# Patient Record
Sex: FEMALE | Race: WHITE | NOT HISPANIC OR LATINO | Employment: FULL TIME | URBAN - METROPOLITAN AREA
[De-identification: names, ages, dates, MRNs, and addresses within clinical notes are randomized per-mention and may not be internally consistent; named-entity substitution may affect disease eponyms.]

---

## 2018-12-04 ENCOUNTER — HOSPITAL ENCOUNTER (OUTPATIENT)
Dept: RADIOLOGY | Facility: CLINIC | Age: 58
Discharge: HOME | End: 2018-12-04
Attending: PODIATRIST
Payer: COMMERCIAL

## 2018-12-04 ENCOUNTER — OFFICE VISIT (OUTPATIENT)
Dept: PODIATRY | Facility: CLINIC | Age: 58
End: 2018-12-04
Payer: COMMERCIAL

## 2018-12-04 VITALS — WEIGHT: 240.75 LBS | BODY MASS INDEX: 44.3 KG/M2 | HEIGHT: 62 IN

## 2018-12-04 DIAGNOSIS — M79.671 FOOT PAIN, BILATERAL: ICD-10-CM

## 2018-12-04 DIAGNOSIS — M24.573 EQUINUS CONTRACTURE OF ANKLE: ICD-10-CM

## 2018-12-04 DIAGNOSIS — M79.672 FOOT PAIN, BILATERAL: ICD-10-CM

## 2018-12-04 DIAGNOSIS — M77.9 ENTHESOPATHY: Primary | ICD-10-CM

## 2018-12-04 DIAGNOSIS — M77.9 ENTHESOPATHY: ICD-10-CM

## 2018-12-04 PROCEDURE — 73650 X-RAY EXAM OF HEEL: CPT | Mod: 26,50,S$GLB, | Performed by: RADIOLOGY

## 2018-12-04 PROCEDURE — 73650 X-RAY EXAM OF HEEL: CPT | Mod: 50,TC,FY,PO

## 2018-12-04 PROCEDURE — 3008F BODY MASS INDEX DOCD: CPT | Mod: CPTII,S$GLB,, | Performed by: PODIATRIST

## 2018-12-04 PROCEDURE — 99213 OFFICE O/P EST LOW 20 MIN: CPT | Mod: S$GLB,,, | Performed by: PODIATRIST

## 2018-12-04 PROCEDURE — 99999 PR PBB SHADOW E&M-EST. PATIENT-LVL III: CPT | Mod: PBBFAC,,, | Performed by: PODIATRIST

## 2018-12-04 RX ORDER — DICLOFENAC SODIUM 10 MG/G
2 GEL TOPICAL 4 TIMES DAILY
Qty: 100 G | Refills: 2 | Status: SHIPPED | OUTPATIENT
Start: 2018-12-04 | End: 2023-02-06

## 2018-12-04 NOTE — PROGRESS NOTES
Subjective:      Patient ID: Angelina Jennings is a 58 y.o. female.    Chief Complaint: Foot Pain (bilateral ) and Heel Pain (bilateral)    Sharp deep pain back of left and right heels.  Gradual onset, worsening over past several weeks, aggravated by increased weight bearing, shoe gear, pressure.  No previous medical treatment.  OTC pain med not helping. Denies trauma, surgery.    Review of Systems   Constitution: Negative for chills, diaphoresis, fever, malaise/fatigue and night sweats.   Cardiovascular: Negative for claudication, cyanosis, leg swelling and syncope.   Skin: Negative for color change, dry skin, nail changes, rash, suspicious lesions and unusual hair distribution.   Musculoskeletal: Negative for falls, joint pain, joint swelling, muscle cramps, muscle weakness and stiffness.   Gastrointestinal: Negative for constipation, diarrhea, nausea and vomiting.   Neurological: Negative for brief paralysis, disturbances in coordination, focal weakness, numbness, paresthesias, sensory change and tremors.           Objective:      Physical Exam   Constitutional: She is oriented to person, place, and time. She appears well-developed and well-nourished. She is cooperative. No distress.   Cardiovascular:   Pulses:       Popliteal pulses are 2+ on the right side, and 2+ on the left side.        Dorsalis pedis pulses are 2+ on the right side, and 2+ on the left side.        Posterior tibial pulses are 2+ on the right side, and 2+ on the left side.   Capillary refill 3 seconds all toes/distal feet, all toes/both feet warm to touch.      Negative lymphadenopathy bilateral popliteal fossa and tarsal tunnel.      Negavie lower extremity edema bilateral.     Musculoskeletal:        Right ankle: She exhibits normal range of motion, no swelling, no ecchymosis, no deformity, no laceration and normal pulse. Achilles tendon normal. Achilles tendon exhibits no pain, no defect and normal Castillo's test results.   Pain to palpation  posterior heel left more than right with visible and palpable hard prominence of bone.  No evidence of trauma or infection present today.    Ankle dorsiflexion decreased at <10 degrees bilateral with moderate increase with knee flexion bilateral.    Otherwise, Normal angle, base, station of gait. All ten toes without clubbing, cyanosis, or signs of ischemia.  No pain to palpation bilateral lower extremities.  Range of motion, stability, muscle strength, and muscle tone normal bilateral feet and legs.    Lymphadenopathy: No inguinal adenopathy noted on the right or left side.   Negative lymphadenopathy bilateral popliteal fossa and tarsal tunnel.    Negative lymphangitic streaking bilateral feet/ankles/legs.   Neurological: She is alert and oriented to person, place, and time. She has normal strength. She displays no atrophy and no tremor. No sensory deficit. She exhibits normal muscle tone. Gait normal.   Reflex Scores:       Patellar reflexes are 2+ on the right side and 2+ on the left side.       Achilles reflexes are 2+ on the right side and 2+ on the left side.  Negative tinel sign to percussion sural, superficial peroneal, deep peroneal, saphenous, and posterior tibial nerves right and left ankles and feet.     Skin: Skin is warm, dry and intact. Capillary refill takes 2 to 3 seconds. No abrasion, no bruising, no burn, no ecchymosis, no laceration, no lesion and no rash noted. She is not diaphoretic. No cyanosis or erythema. No pallor. Nails show no clubbing.     Skin is normal age and health appropriate color, turgor, texture, and temperature bilateral lower extremities without ulceration, hyperpigmentation, discoloration, masses nodules or cords palpated.  No ecchymosis, erythema, edema, or cardinal signs of infection bilateral lower extremities.     Psychiatric: She has a normal mood and affect.             Assessment:       Encounter Diagnoses   Name Primary?    Enthesopathy Yes    Equinus contracture of  ankle     Foot pain, bilateral          Plan:       Angelina was seen today for foot pain and heel pain.    Diagnoses and all orders for this visit:    Enthesopathy  -     X-Ray Calcaneus Bilateral; Future  -     ORTHOTIC DEVICE (DME)    Equinus contracture of ankle  -     X-Ray Calcaneus Bilateral; Future  -     ORTHOTIC DEVICE (DME)    Foot pain, bilateral  -     X-Ray Calcaneus Bilateral; Future  -     ORTHOTIC DEVICE (DME)    Other orders  -     diclofenac sodium (VOLTAREN) 1 % Gel; Apply 2 g topically 4 (four) times daily.      I counseled the patient on her conditions, their implications and medical management.    Patient will stretch the tendo achilles complex three times daily as demonstrated in the office.  Literature was dispensed illustrating proper stretching technique.    Patient will obtain over the counter arch supports and wear them in shoes whenever possible.  Athletic shoes intended for walking or running are usually best.    The patient was advised that NSAID-type medications have two very important potential side effects: gastrointestinal irritation including hemorrhage and renal injuries. She was asked to take the medication with food and to stop if she experiences any GI upset. I asked her to call for vomiting, abdominal pain or black/bloody stools. The patient expresses understanding of these issues and questions were answered.    Discussed conservative treatment with shoes of adequate dimensions, material, and style to alleviate symptoms and delay or prevent surgical intervention.    Rx xrays, voltaren gel, heel lift, quad cane          Follow-up in about 1 month (around 1/4/2019).

## 2019-01-08 ENCOUNTER — OFFICE VISIT (OUTPATIENT)
Dept: PODIATRY | Facility: CLINIC | Age: 59
End: 2019-01-08
Payer: COMMERCIAL

## 2019-01-08 VITALS
BODY MASS INDEX: 44.16 KG/M2 | HEIGHT: 62 IN | SYSTOLIC BLOOD PRESSURE: 136 MMHG | DIASTOLIC BLOOD PRESSURE: 75 MMHG | HEART RATE: 73 BPM | WEIGHT: 240 LBS

## 2019-01-08 DIAGNOSIS — M79.672 FOOT PAIN, BILATERAL: ICD-10-CM

## 2019-01-08 DIAGNOSIS — M24.573 EQUINUS CONTRACTURE OF ANKLE: ICD-10-CM

## 2019-01-08 DIAGNOSIS — M77.9 ENTHESOPATHY: Primary | ICD-10-CM

## 2019-01-08 DIAGNOSIS — M79.671 FOOT PAIN, BILATERAL: ICD-10-CM

## 2019-01-08 PROCEDURE — 99999 PR PBB SHADOW E&M-EST. PATIENT-LVL III: CPT | Mod: PBBFAC,,, | Performed by: PODIATRIST

## 2019-01-08 PROCEDURE — 99999 PR PBB SHADOW E&M-EST. PATIENT-LVL III: ICD-10-PCS | Mod: PBBFAC,,, | Performed by: PODIATRIST

## 2019-01-08 PROCEDURE — 99212 OFFICE O/P EST SF 10 MIN: CPT | Mod: S$GLB,,, | Performed by: PODIATRIST

## 2019-01-08 PROCEDURE — 99212 PR OFFICE/OUTPT VISIT, EST, LEVL II, 10-19 MIN: ICD-10-PCS | Mod: S$GLB,,, | Performed by: PODIATRIST

## 2019-01-08 PROCEDURE — 3008F BODY MASS INDEX DOCD: CPT | Mod: CPTII,S$GLB,, | Performed by: PODIATRIST

## 2019-01-08 PROCEDURE — 3008F PR BODY MASS INDEX (BMI) DOCUMENTED: ICD-10-PCS | Mod: CPTII,S$GLB,, | Performed by: PODIATRIST

## 2019-01-08 NOTE — PROGRESS NOTES
Subjective:      Patient ID: Angelina Jennings is a 58 y.o. female.    Chief Complaint: Foot Pain (left)    Sharp deep pain back of left and right heels.  Gradual onset, worsening over past several weeks, aggravated by increased weight bearing, shoe gear, pressure.  Improved well with nsaid gel, stretches, activity change. Denies trauma, surgery.    Review of Systems   Constitution: Negative for chills, diaphoresis, fever, malaise/fatigue and night sweats.   Cardiovascular: Negative for claudication, cyanosis, leg swelling and syncope.   Skin: Negative for color change, dry skin, nail changes, rash, suspicious lesions and unusual hair distribution.   Musculoskeletal: Negative for falls, joint pain, joint swelling, muscle cramps, muscle weakness and stiffness.   Gastrointestinal: Negative for constipation, diarrhea, nausea and vomiting.   Neurological: Negative for brief paralysis, disturbances in coordination, focal weakness, numbness, paresthesias, sensory change and tremors.           Objective:      Physical Exam   Constitutional: She is oriented to person, place, and time. She appears well-developed and well-nourished. She is cooperative. No distress.   Cardiovascular:   Pulses:       Popliteal pulses are 2+ on the right side, and 2+ on the left side.        Dorsalis pedis pulses are 2+ on the right side, and 2+ on the left side.        Posterior tibial pulses are 2+ on the right side, and 2+ on the left side.   Capillary refill 3 seconds all toes/distal feet, all toes/both feet warm to touch.      Negative lymphadenopathy bilateral popliteal fossa and tarsal tunnel.      Negavie lower extremity edema bilateral.     Musculoskeletal:        Right ankle: She exhibits normal range of motion, no swelling, no ecchymosis, no deformity, no laceration and normal pulse. Achilles tendon normal. Achilles tendon exhibits no pain, no defect and normal Castillo's test results.   Pain to palpation posterior heel left more than right  with visible and palpable hard prominence of bone.  No evidence of trauma or infection present today.    Ankle dorsiflexion decreased at <10 degrees bilateral with moderate increase with knee flexion bilateral.    Otherwise, Normal angle, base, station of gait. All ten toes without clubbing, cyanosis, or signs of ischemia.  No pain to palpation bilateral lower extremities.  Range of motion, stability, muscle strength, and muscle tone normal bilateral feet and legs.    Lymphadenopathy: No inguinal adenopathy noted on the right or left side.   Negative lymphadenopathy bilateral popliteal fossa and tarsal tunnel.    Negative lymphangitic streaking bilateral feet/ankles/legs.   Neurological: She is alert and oriented to person, place, and time. She has normal strength. She displays no atrophy and no tremor. No sensory deficit. She exhibits normal muscle tone. Gait normal.   Reflex Scores:       Patellar reflexes are 2+ on the right side and 2+ on the left side.       Achilles reflexes are 2+ on the right side and 2+ on the left side.  Negative tinel sign to percussion sural, superficial peroneal, deep peroneal, saphenous, and posterior tibial nerves right and left ankles and feet.     Skin: Skin is warm, dry and intact. Capillary refill takes 2 to 3 seconds. No abrasion, no bruising, no burn, no ecchymosis, no laceration, no lesion and no rash noted. She is not diaphoretic. No cyanosis or erythema. No pallor. Nails show no clubbing.     Skin is normal age and health appropriate color, turgor, texture, and temperature bilateral lower extremities without ulceration, hyperpigmentation, discoloration, masses nodules or cords palpated.  No ecchymosis, erythema, edema, or cardinal signs of infection bilateral lower extremities.     Psychiatric: She has a normal mood and affect.             Assessment:       Encounter Diagnoses   Name Primary?    Enthesopathy Yes    Equinus contracture of ankle     Foot pain, bilateral           Plan:       Angelina was seen today for foot pain.    Diagnoses and all orders for this visit:    Enthesopathy    Equinus contracture of ankle    Foot pain, bilateral      I counseled the patient on her conditions, their implications and medical management.    Continue stretches, nsaid gel prn, careful shoe/activity selection.          No Follow-up on file.

## 2019-10-14 RX ORDER — POTASSIUM CHLORIDE 750 MG/1
30 CAPSULE, EXTENDED RELEASE ORAL DAILY
Qty: 270 CAPSULE | Refills: 1 | Status: SHIPPED | OUTPATIENT
Start: 2019-10-14 | End: 2020-01-30 | Stop reason: SDUPTHER

## 2019-10-14 NOTE — TELEPHONE ENCOUNTER
Pt left a VM regarding her Potassium. States it was written wrong 3 capsules daily #30. Set up with correct quantity.

## 2019-12-04 ENCOUNTER — TELEPHONE (OUTPATIENT)
Dept: FAMILY MEDICINE | Facility: CLINIC | Age: 59
End: 2019-12-04

## 2019-12-04 ENCOUNTER — OFFICE VISIT (OUTPATIENT)
Dept: FAMILY MEDICINE | Facility: CLINIC | Age: 59
End: 2019-12-04
Payer: COMMERCIAL

## 2019-12-04 VITALS
DIASTOLIC BLOOD PRESSURE: 78 MMHG | WEIGHT: 248 LBS | SYSTOLIC BLOOD PRESSURE: 128 MMHG | BODY MASS INDEX: 45.64 KG/M2 | HEART RATE: 68 BPM | HEIGHT: 62 IN

## 2019-12-04 DIAGNOSIS — E11.9 TYPE 2 DIABETES MELLITUS WITHOUT COMPLICATION, WITHOUT LONG-TERM CURRENT USE OF INSULIN: ICD-10-CM

## 2019-12-04 DIAGNOSIS — L03.211 CELLULITIS OF FACE: Primary | ICD-10-CM

## 2019-12-04 PROCEDURE — 99213 PR OFFICE/OUTPT VISIT, EST, LEVL III, 20-29 MIN: ICD-10-PCS | Mod: S$GLB,,, | Performed by: NURSE PRACTITIONER

## 2019-12-04 PROCEDURE — 3008F PR BODY MASS INDEX (BMI) DOCUMENTED: ICD-10-PCS | Mod: S$GLB,,, | Performed by: NURSE PRACTITIONER

## 2019-12-04 PROCEDURE — 3008F BODY MASS INDEX DOCD: CPT | Mod: S$GLB,,, | Performed by: NURSE PRACTITIONER

## 2019-12-04 PROCEDURE — 99213 OFFICE O/P EST LOW 20 MIN: CPT | Mod: S$GLB,,, | Performed by: NURSE PRACTITIONER

## 2019-12-04 RX ORDER — CHOLECALCIFEROL (VITAMIN D3) 25 MCG
2500 TABLET,CHEWABLE ORAL DAILY
COMMUNITY
End: 2023-12-18

## 2019-12-04 RX ORDER — METFORMIN HYDROCHLORIDE 750 MG/1
1 TABLET, EXTENDED RELEASE ORAL 2 TIMES DAILY
Refills: 5 | COMMUNITY
Start: 2019-10-26 | End: 2020-01-30 | Stop reason: SDUPTHER

## 2019-12-04 RX ORDER — AMOXICILLIN AND CLAVULANATE POTASSIUM 875; 125 MG/1; MG/1
1 TABLET, FILM COATED ORAL EVERY 12 HOURS
Qty: 10 TABLET | Refills: 0 | Status: SHIPPED | OUTPATIENT
Start: 2019-12-04 | End: 2019-12-09

## 2019-12-04 RX ORDER — NAPROXEN SODIUM 220 MG/1
81 TABLET, FILM COATED ORAL DAILY
COMMUNITY

## 2019-12-04 RX ORDER — MUPIROCIN 20 MG/G
OINTMENT TOPICAL 3 TIMES DAILY
Qty: 1 TUBE | Refills: 1 | Status: SHIPPED | OUTPATIENT
Start: 2019-12-04 | End: 2020-01-30

## 2019-12-04 NOTE — TELEPHONE ENCOUNTER
----- Message from Jane Arredondo sent at 12/4/2019 10:20 AM CST -----  Pt got bite by a spider or bug, she got bite on Saturday, her left eye is swollen, lump on the left side of there jaw a little warm to the touch .doesnt know if she should be seen or just take meds from [facility]   Pt [phone]

## 2019-12-04 NOTE — PROGRESS NOTES
SUBJECTIVE:    Patient ID: Angelina Jennings is a 59 y.o. female.    Chief Complaint: Insect Bite (forehead, nose, left eye, left side of face since Saturday, no bottles, used pharmacy [facility]// [facility])    59 year old female presents for facial redness and swelling after bug bite. Was bit by unknown bug over weekend in center of forehead. Progressed to more swelling and redness. Two more spots appeared on either side of nasal bridge on Monday. Today L eyelid is swollen and a swollen mass on L jawline. Denies pain or fever. Also requesting refill on her glucometer test strips.      No visits with results within 6 Month(s) from this visit.   Latest known visit with results is:   No results found for any previous visit.       Past Medical History:   Diagnosis Date    Diabetes mellitus, type 2     Hypertension      Past Surgical History:   Procedure Laterality Date    TONSILLECTOMY       History reviewed. No pertinent family history.    Marital Status:   Alcohol History:  reports that she does not drink alcohol.  Tobacco History:  reports that she quit smoking about 6 years ago. She has never used smokeless tobacco.  Drug History:  reports that she does not use drugs.    Review of patient's allergies indicates:  No Known Allergies    Current Outpatient Medications:     aspirin 81 MG Chew, 1 tablet, Disp: , Rfl:     atenolol-chlorthalidone (TENORETIC) 50-25 mg Tab, Take 1 tablet by mouth once daily. , Disp: , Rfl:     blood sugar diagnostic (ONETOUCH ULTRA BLUE TEST STRIP) Strp, as directed, Disp: 30 strip, Rfl: 2    cyanocobalamin (VITAMIN B-12) 1000 MCG tablet, 1 tablet, Disp: , Rfl:     FLUBLOK QUAD 5772-3560, PF, 180 mcg (45 mcg x 4)/0.5 mL Syrg, PHARMACIST ADMINISTERED IMMUNIZATION ADMINISTERED AT TIME OF DISPENSING, Disp: , Rfl: 0    INVOKANA 100 mg Tab, Take 100 mg by mouth once daily. , Disp: , Rfl:     losartan (COZAAR) 25 MG tablet, Take 25 mg by mouth 2 (two) times daily. , Disp: , Rfl:     metFORMIN  "(GLUCOPHAGE-XR) 750 MG 24 hr tablet, Take 1 tablet by mouth 2 (two) times daily., Disp: , Rfl: 5    potassium chloride (MICRO-K) 10 MEQ CpSR, Take 3 capsules (30 mEq total) by mouth once daily., Disp: 270 capsule, Rfl: 1    amoxicillin-clavulanate 875-125mg (AUGMENTIN) 875-125 mg per tablet, Take 1 tablet by mouth every 12 (twelve) hours. for 5 days, Disp: 10 tablet, Rfl: 0    diclofenac sodium (VOLTAREN) 1 % Gel, Apply 2 g topically 4 (four) times daily., Disp: 100 g, Rfl: 2    mupirocin (BACTROBAN) 2 % ointment, Apply topically 3 (three) times daily., Disp: 1 Tube, Rfl: 1    Review of Systems   HENT: Positive for facial swelling. Negative for congestion, ear pain, sinus pressure and sinus pain.    Respiratory: Negative for cough and shortness of breath.    Cardiovascular: Negative for chest pain.   Skin: Positive for rash and wound.          Objective:      Vitals:    12/04/19 1114   BP: 128/78   Pulse: 68   Weight: 112.5 kg (248 lb)   Height: 5' 2.25" (1.581 m)     Body mass index is 45 kg/m².  Physical Exam   Constitutional: She appears well-developed.   HENT:   Right Ear: Tympanic membrane and ear canal normal.   Left Ear: Tympanic membrane and ear canal normal.   Nose: Nose normal.   Mouth/Throat: Oropharynx is clear and moist.   Eyes: No foreign body present in the left eye. Left conjunctiva is not injected. Left eye exhibits normal extraocular motion.   L lid swelling   Cardiovascular: Normal rate and regular rhythm.   Lymphadenopathy:        Head (right side): No preauricular adenopathy present.        Head (left side): Preauricular adenopathy present.     She has no cervical adenopathy.   Skin: Rash noted. There is erythema.   Red, raised, circular redness about 1cm circumference with small scabbing in center. Similar spots about 0.5cm on either side of nasal bridge.         Assessment:       1. Cellulitis of face    2. Type 2 diabetes mellitus without complication, without long-term current use of " insulin         Plan:       Cellulitis of face  -     amoxicillin-clavulanate 875-125mg (AUGMENTIN) 875-125 mg per tablet; Take 1 tablet by mouth every 12 (twelve) hours. for 5 days  Dispense: 10 tablet; Refill: 0  -     mupirocin (BACTROBAN) 2 % ointment; Apply topically 3 (three) times daily.  Dispense: 1 Tube; Refill: 1  - OTC benadryl prn and cold compresses.    Type 2 diabetes mellitus without complication, without long-term current use of insulin  -     blood sugar diagnostic (ONETOUCH ULTRA BLUE TEST STRIP) Strp; as directed  Dispense: 30 strip; Refill:       Follow up if symptoms worsen or fail to improve. For fever or worsening symptoms.   Follow up with [facility] for routine wellness.

## 2019-12-04 NOTE — TELEPHONE ENCOUNTER
----- Message from Jane Arredondo sent at 12/4/2019 12:13 PM CST -----  [facility] is needing clarification on a medication   [place] [phone]

## 2019-12-06 ENCOUNTER — TELEPHONE (OUTPATIENT)
Dept: FAMILY MEDICINE | Facility: CLINIC | Age: 59
End: 2019-12-06

## 2019-12-06 DIAGNOSIS — H10.9 CONJUNCTIVITIS OF BOTH EYES, UNSPECIFIED CONJUNCTIVITIS TYPE: Primary | ICD-10-CM

## 2019-12-06 RX ORDER — POLYMYXIN B SULFATE AND TRIMETHOPRIM 1; 10000 MG/ML; [USP'U]/ML
1 SOLUTION OPHTHALMIC EVERY 6 HOURS
Qty: 2.6667 ML | Refills: 0 | Status: SHIPPED | OUTPATIENT
Start: 2019-12-06 | End: 2019-12-16

## 2019-12-06 NOTE — TELEPHONE ENCOUNTER
----- Message from Farideh Klein sent at 12/6/2019  9:50 AM CST -----  Pt has been taking Amoxacillin 875-125 mg tabs and now has symptoms of pink eye in both eyes.  Pt. Would like something called in for pink eye please. She would rather not come in if possible.[facility] [address]. Pt #[phone]

## 2019-12-06 NOTE — TELEPHONE ENCOUNTER
I reviewed the OV note from [facility]. Looks like facial cellulitis diagnosed. I called patient for clarification on new sxs and she reports some improvement to the facial swelling and infection but reports new conjunctivitis sxs. I am going to send in polytrim now and she has been instructed if sxs worsen will need further evaluation

## 2020-01-16 ENCOUNTER — TELEPHONE (OUTPATIENT)
Dept: FAMILY MEDICINE | Facility: CLINIC | Age: 60
End: 2020-01-16

## 2020-01-16 DIAGNOSIS — Z79.899 ENCOUNTER FOR LONG-TERM (CURRENT) USE OF OTHER MEDICATIONS: ICD-10-CM

## 2020-01-16 DIAGNOSIS — E11.9 TYPE 2 DIABETES MELLITUS WITHOUT COMPLICATION, WITHOUT LONG-TERM CURRENT USE OF INSULIN: ICD-10-CM

## 2020-01-16 DIAGNOSIS — Z00.00 ROUTINE GENERAL MEDICAL EXAMINATION AT A HEALTH CARE FACILITY: Primary | ICD-10-CM

## 2020-01-16 NOTE — TELEPHONE ENCOUNTER
----- Message from Geneva Scott sent at 1/16/2020  3:04 PM CST -----  Contact: Angelina Jennings  Pt would like to cancel the request for refills that she put in for earlier today . She says that she has enough til her appt.   Pt# [phone]

## 2020-01-16 NOTE — TELEPHONE ENCOUNTER
----- Message from Jane Arredondo sent at 1/16/2020 10:34 AM CST -----  Pt is needing refils until her appt  On the 30th   Metformin, potassium and invokana, losartan, and atenolol   Pharm [facility] [address]   Pt [phone]

## 2020-01-30 ENCOUNTER — OFFICE VISIT (OUTPATIENT)
Dept: FAMILY MEDICINE | Facility: CLINIC | Age: 60
End: 2020-01-30
Payer: COMMERCIAL

## 2020-01-30 VITALS — HEIGHT: 62 IN | BODY MASS INDEX: 44.97 KG/M2 | WEIGHT: 244.38 LBS

## 2020-01-30 DIAGNOSIS — I10 ESSENTIAL HYPERTENSION: ICD-10-CM

## 2020-01-30 DIAGNOSIS — Z00.00 ROUTINE PHYSICAL EXAMINATION: ICD-10-CM

## 2020-01-30 DIAGNOSIS — E11.9 TYPE 2 DIABETES MELLITUS WITHOUT COMPLICATION, WITHOUT LONG-TERM CURRENT USE OF INSULIN: Primary | ICD-10-CM

## 2020-01-30 DIAGNOSIS — M18.12 ARTHRITIS OF CARPOMETACARPAL (CMC) JOINT OF LEFT THUMB: ICD-10-CM

## 2020-01-30 LAB — HBA1C MFR BLD: 7.2 %

## 2020-01-30 PROCEDURE — 3078F PR MOST RECENT DIASTOLIC BLOOD PRESSURE < 80 MM HG: ICD-10-PCS | Mod: S$GLB,,, | Performed by: PHYSICIAN ASSISTANT

## 2020-01-30 PROCEDURE — 99396 PR PREVENTIVE VISIT,EST,40-64: ICD-10-PCS | Mod: S$GLB,,, | Performed by: PHYSICIAN ASSISTANT

## 2020-01-30 PROCEDURE — 3074F SYST BP LT 130 MM HG: CPT | Mod: S$GLB,,, | Performed by: PHYSICIAN ASSISTANT

## 2020-01-30 PROCEDURE — 83036 POCT HEMOGLOBIN A1C: ICD-10-PCS | Mod: QW,,, | Performed by: PHYSICIAN ASSISTANT

## 2020-01-30 PROCEDURE — 3074F PR MOST RECENT SYSTOLIC BLOOD PRESSURE < 130 MM HG: ICD-10-PCS | Mod: S$GLB,,, | Performed by: PHYSICIAN ASSISTANT

## 2020-01-30 PROCEDURE — 83036 HEMOGLOBIN GLYCOSYLATED A1C: CPT | Mod: QW,,, | Performed by: PHYSICIAN ASSISTANT

## 2020-01-30 PROCEDURE — 3078F DIAST BP <80 MM HG: CPT | Mod: S$GLB,,, | Performed by: PHYSICIAN ASSISTANT

## 2020-01-30 PROCEDURE — 99396 PREV VISIT EST AGE 40-64: CPT | Mod: S$GLB,,, | Performed by: PHYSICIAN ASSISTANT

## 2020-01-30 RX ORDER — CANAGLIFLOZIN 300 MG/1
300 TABLET, FILM COATED ORAL DAILY
Qty: 90 TABLET | Refills: 1 | Status: SHIPPED | OUTPATIENT
Start: 2020-01-30 | End: 2020-07-28

## 2020-01-30 RX ORDER — LOSARTAN POTASSIUM 25 MG/1
50 TABLET ORAL DAILY
Qty: 180 TABLET | Refills: 1 | Status: SHIPPED | OUTPATIENT
Start: 2020-01-30 | End: 2020-07-30 | Stop reason: SDUPTHER

## 2020-01-30 RX ORDER — METFORMIN HYDROCHLORIDE 750 MG/1
750 TABLET, EXTENDED RELEASE ORAL 2 TIMES DAILY
Qty: 180 TABLET | Refills: 1 | Status: SHIPPED | OUTPATIENT
Start: 2020-01-30 | End: 2020-06-25 | Stop reason: SDUPTHER

## 2020-01-30 RX ORDER — ATENOLOL AND CHLORTHALIDONE TABLET 50; 25 MG/1; MG/1
1 TABLET ORAL DAILY
Qty: 90 TABLET | Refills: 1 | Status: SHIPPED | OUTPATIENT
Start: 2020-01-30 | End: 2020-07-30 | Stop reason: SDUPTHER

## 2020-01-30 RX ORDER — CANAGLIFLOZIN 300 MG/1
300 TABLET, FILM COATED ORAL DAILY
COMMUNITY
Start: 2020-01-16 | End: 2020-01-30 | Stop reason: SDUPTHER

## 2020-01-30 RX ORDER — POTASSIUM CHLORIDE 750 MG/1
30 CAPSULE, EXTENDED RELEASE ORAL DAILY
Qty: 270 CAPSULE | Refills: 1 | Status: SHIPPED | OUTPATIENT
Start: 2020-01-30 | End: 2020-07-28

## 2020-01-30 RX ORDER — ASCORBIC ACID 500 MG
500 TABLET ORAL DAILY
COMMUNITY

## 2020-01-30 NOTE — PATIENT INSTRUCTIONS
What is Arthritis?  Arthritis is a disease that affects the joints (the parts where bones meet and move). It can affect any joint in your body. There are many types of arthritis, including osteoarthritis and rheumatoid arthrtitis. If your symptoms are mild, medications may be enough to reduce pain and swelling. For more severe arthritis, surgery may be needed to improve the condition of the joint or replace the joint entirely.                  What causes arthritis?  Cartilage is a smooth substance that protects the ends of your bones and provides cushioning. When you have arthritis, this cartilage breaks down and can no longer protect your bones. The bones rub against each other, causing pain and swelling. Over time, bone spurs (small pieces of rough or splintered bone) may develop, and the joint's range of motion can become limited.  Symptoms  Some of the more common symptoms of arthritis include:  · Joint pain and stiffness. Pain and stiffness get worse with long periods of rest or using a joint too long or too hard.  · Joints that have lost normal shape and motion.  · Tender, inflamed joints. They may look red and feel warm.  · Grinding or popping noise with joint movement.   · Feeling tired all the time.  Reducing symptoms  Following a healthy lifestyle by losing weight and exercising can help reduce symptoms of osteoarthritis. Medicines can be very helpful for arthritis.     Date Last Reviewed: 9/10/2015  © 7236-4287 [employer]. [address] [place] [address], [place] [address]. All rights reserved. This information is not intended as a substitute for professional medical care. Always follow your healthcare professional's instructions.

## 2020-01-30 NOTE — PROGRESS NOTES
"  SUBJECTIVE:    Patient ID: Angelina Jennings is a 59 y.o. female.    Chief Complaint: Diabetes (6 month follow up.....mlr); Medication Refill (Med bottles present and reconciled....pharmacy verified.....mlr); and Hand Pain (Pt reports she "felt a pop" in her left hand, near thumb, and now having pain in hand.....mlr)    This is a 59-year-old white female who presents today for regular 3 month diabetic check up.  She reports that she has been doing well with no major concerns.  She does bring up some left thumb pain of late.  She reports feeling a pop and then now some persistent pain since.  Her A1c comes back at 7.2% and right close to goal.  Her blood pressure also remains well controlled today.  She is due for annual labs and her diabetic foot exam. She had a bad bout with facial cellulitis back in the fall. Finally cleared out nicely.      No visits with results within 6 Month(s) from this visit.   Latest known visit with results is:   No results found for any previous visit.       Past Medical History:   Diagnosis Date    Diabetes mellitus, type 2     Hypertension      Past Surgical History:   Procedure Laterality Date    TONSILLECTOMY       History reviewed. No pertinent family history.    Marital Status:   Alcohol History:  reports that she drinks alcohol.  Tobacco History:  reports that she quit smoking about 6 years ago. She has never used smokeless tobacco.  Drug History:  reports that she does not use drugs.    Review of patient's allergies indicates:  No Known Allergies    Current Outpatient Medications:     ascorbic acid, vitamin C, (VITAMIN C) 500 MG tablet, Take 500 mg by mouth once daily., Disp: , Rfl:     aspirin 81 MG Chew, Take 81 mg by mouth once daily. , Disp: , Rfl:     atenolol-chlorthalidone (TENORETIC) 50-25 mg Tab, Take 1 tablet by mouth once daily., Disp: 90 tablet, Rfl: 1    blood sugar diagnostic (ONETOUCH ULTRA BLUE TEST STRIP) Strp, 30 strips by In Vitro route once daily. as " "directed, Disp: 30 strip, Rfl: 2    cyanocobalamin, vitamin B-12, 2,500 mcg Tab, 2,500 mcg once daily. , Disp: , Rfl:     diclofenac sodium (VOLTAREN) 1 % Gel, Apply 2 g topically 4 (four) times daily., Disp: 100 g, Rfl: 2    INVOKANA 300 mg Tab tablet, Take 1 tablet (300 mg total) by mouth once daily., Disp: 90 tablet, Rfl: 1    losartan (COZAAR) 25 MG tablet, Take 2 tablets (50 mg total) by mouth once daily., Disp: 180 tablet, Rfl: 1    metFORMIN (GLUCOPHAGE-XR) 750 MG 24 hr tablet, Take 1 tablet (750 mg total) by mouth 2 (two) times daily., Disp: 180 tablet, Rfl: 1    potassium chloride (MICRO-K) 10 MEQ CpSR, Take 3 capsules (30 mEq total) by mouth once daily., Disp: 270 capsule, Rfl: 1    Review of Systems   Constitutional: Negative for appetite change, chills, fatigue, fever and unexpected weight change.   HENT: Negative for congestion.    Respiratory: Negative for cough, chest tightness and shortness of breath.    Cardiovascular: Negative for chest pain and palpitations.   Gastrointestinal: Negative for abdominal distention and abdominal pain.   Endocrine: Negative for cold intolerance and heat intolerance.   Genitourinary: Negative for difficulty urinating and dysuria.   Musculoskeletal: Positive for arthralgias and joint swelling. Negative for back pain.   Neurological: Negative for dizziness, weakness and headaches.          Objective:      Vitals:    01/30/20 0757   BP: (P) 122/78   Pulse: (P) 60   Weight: 110.9 kg (244 lb 6.4 oz)   Height: 5' 2.25" (1.581 m)     Physical Exam   Constitutional: She is oriented to person, place, and time. She appears well-developed and well-nourished. No distress.   HENT:   Head: Normocephalic and atraumatic.   Eyes: Pupils are equal, round, and reactive to light. Conjunctivae and EOM are normal.   Neck: Normal range of motion. Neck supple. No thyromegaly present.   Cardiovascular: Normal rate, regular rhythm, normal heart sounds and intact distal pulses.   Pulses:    "    Dorsalis pedis pulses are 2+ on the right side, and 2+ on the left side.        Posterior tibial pulses are 2+ on the right side, and 2+ on the left side.   Pulmonary/Chest: Effort normal and breath sounds normal.   Abdominal: Soft. Bowel sounds are normal. She exhibits no distension. There is no tenderness.   Musculoskeletal: Normal range of motion.        Right foot: There is normal range of motion and no deformity.        Left foot: There is normal range of motion and no deformity.   Feet:   Right Foot:   Protective Sensation: 2 sites tested. 2 sites sensed.   Skin Integrity: Positive for callus and dry skin. Negative for ulcer or blister.   Left Foot:   Protective Sensation: 2 sites tested. 2 sites sensed.   Skin Integrity: Positive for callus and dry skin.   Neurological: She is alert and oriented to person, place, and time. No cranial nerve deficit.   Skin: Skin is warm and dry. No erythema.   Psychiatric: She has a normal mood and affect.         Assessment:       1. Type 2 diabetes mellitus without complication, without long-term current use of insulin    2. Essential hypertension    3. Routine physical examination    4. Arthritis of carpometacarpal (CMC) joint of left thumb         Plan:       Type 2 diabetes mellitus without complication, without long-term current use of insulin  Comments:  A1c is at 7.2% and she is right close to goal. we discussed altering diet a bit. Not going to change meds at this time. DFE completed.  Orders:  -     Hemoglobin A1C, POCT  -     metFORMIN (GLUCOPHAGE-XR) 750 MG 24 hr tablet; Take 1 tablet (750 mg total) by mouth 2 (two) times daily.  Dispense: 180 tablet; Refill: 1  -     INVOKANA 300 mg Tab tablet; Take 1 tablet (300 mg total) by mouth once daily.  Dispense: 90 tablet; Refill: 1    Essential hypertension  Comments:  BP looks stable, continue as is.  Orders:  -     atenolol-chlorthalidone (TENORETIC) 50-25 mg Tab; Take 1 tablet by mouth once daily.  Dispense: 90  tablet; Refill: 1  -     potassium chloride (MICRO-K) 10 MEQ CpSR; Take 3 capsules (30 mEq total) by mouth once daily.  Dispense: 270 capsule; Refill: 1  -     losartan (COZAAR) 25 MG tablet; Take 2 tablets (50 mg total) by mouth once daily.  Dispense: 180 tablet; Refill: 1    Routine physical examination  Comments:  She has orderrs to get her annual labs done for me today.    Arthritis of carpometacarpal (CMC) joint of left thumb  Comments:  thumb spika splint discussed. voltaren to the affected area. If no better will consider xrays      Follow up in about 6 months (around 7/30/2020) for Diabetic Check-Up.        1/30/2020 Mendoza Hardy PA-C

## 2020-01-31 LAB
ALBUMIN SERPL-MCNC: 4.7 G/DL (ref 3.6–5.1)
ALBUMIN/CREAT UR: 38 MCG/MG CREAT
ALBUMIN/GLOB SERPL: 1.6 (CALC) (ref 1–2.5)
ALP SERPL-CCNC: 97 U/L (ref 33–130)
ALT SERPL-CCNC: 25 U/L (ref 6–29)
APPEARANCE UR: CLEAR
AST SERPL-CCNC: 19 U/L (ref 10–35)
BACTERIA #/AREA URNS HPF: ABNORMAL /HPF
BACTERIA UR CULT: ABNORMAL
BASOPHILS # BLD AUTO: 57 CELLS/UL (ref 0–200)
BASOPHILS NFR BLD AUTO: 0.6 %
BILIRUB SERPL-MCNC: 0.6 MG/DL (ref 0.2–1.2)
BILIRUB UR QL STRIP: NEGATIVE
BUN SERPL-MCNC: 15 MG/DL (ref 7–25)
BUN/CREAT SERPL: ABNORMAL (CALC) (ref 6–22)
CALCIUM SERPL-MCNC: 10.7 MG/DL (ref 8.6–10.4)
CHLORIDE SERPL-SCNC: 96 MMOL/L (ref 98–110)
CHOLEST SERPL-MCNC: 232 MG/DL
CHOLEST/HDLC SERPL: 3.7 (CALC)
CO2 SERPL-SCNC: 27 MMOL/L (ref 20–32)
COLOR UR: YELLOW
CREAT SERPL-MCNC: 0.62 MG/DL (ref 0.5–1.05)
CREAT UR-MCNC: 37 MG/DL (ref 20–275)
EOSINOPHIL # BLD AUTO: 200 CELLS/UL (ref 15–500)
EOSINOPHIL NFR BLD AUTO: 2.1 %
ERYTHROCYTE [DISTWIDTH] IN BLOOD BY AUTOMATED COUNT: 13.4 % (ref 11–15)
GFRSERPLBLD MDRD-ARVRAT: 99 ML/MIN/1.73M2
GLOBULIN SER CALC-MCNC: 3 G/DL (CALC) (ref 1.9–3.7)
GLUCOSE SERPL-MCNC: 156 MG/DL (ref 65–99)
GLUCOSE UR QL STRIP: ABNORMAL
HBA1C MFR BLD: 7.8 % OF TOTAL HGB
HCT VFR BLD AUTO: 50.4 % (ref 35–45)
HDLC SERPL-MCNC: 62 MG/DL
HGB BLD-MCNC: 16.5 G/DL (ref 11.7–15.5)
HGB UR QL STRIP: NEGATIVE
HYALINE CASTS #/AREA URNS LPF: ABNORMAL /LPF
KETONES UR QL STRIP: NEGATIVE
LDLC SERPL CALC-MCNC: 134 MG/DL (CALC)
LEUKOCYTE ESTERASE UR QL STRIP: NEGATIVE
LYMPHOCYTES # BLD AUTO: 2119 CELLS/UL (ref 850–3900)
LYMPHOCYTES NFR BLD AUTO: 22.3 %
MCH RBC QN AUTO: 28.6 PG (ref 27–33)
MCHC RBC AUTO-ENTMCNC: 32.7 G/DL (ref 32–36)
MCV RBC AUTO: 87.5 FL (ref 80–100)
MICROALBUMIN UR-MCNC: 1.4 MG/DL
MONOCYTES # BLD AUTO: 675 CELLS/UL (ref 200–950)
MONOCYTES NFR BLD AUTO: 7.1 %
NEUTROPHILS # BLD AUTO: 6451 CELLS/UL (ref 1500–7800)
NEUTROPHILS NFR BLD AUTO: 67.9 %
NITRITE UR QL STRIP: NEGATIVE
NONHDLC SERPL-MCNC: 170 MG/DL (CALC)
PH UR STRIP: 8 [PH] (ref 5–8)
PLATELET # BLD AUTO: 352 THOUSAND/UL (ref 140–400)
PMV BLD REES-ECKER: 10.1 FL (ref 7.5–12.5)
POTASSIUM SERPL-SCNC: 3.8 MMOL/L (ref 3.5–5.3)
PROT SERPL-MCNC: 7.7 G/DL (ref 6.1–8.1)
PROT UR QL STRIP: NEGATIVE
RBC # BLD AUTO: 5.76 MILLION/UL (ref 3.8–5.1)
RBC #/AREA URNS HPF: ABNORMAL /HPF
SODIUM SERPL-SCNC: 139 MMOL/L (ref 135–146)
SP GR UR STRIP: 1.03 (ref 1–1.03)
SQUAMOUS #/AREA URNS HPF: ABNORMAL /HPF
TRIGL SERPL-MCNC: 224 MG/DL
TSH SERPL-ACNC: 2.65 MIU/L (ref 0.4–4.5)
WBC # BLD AUTO: 9.5 THOUSAND/UL (ref 3.8–10.8)
WBC #/AREA URNS HPF: ABNORMAL /HPF

## 2020-02-04 ENCOUNTER — TELEPHONE (OUTPATIENT)
Dept: FAMILY MEDICINE | Facility: CLINIC | Age: 60
End: 2020-02-04

## 2020-02-04 NOTE — TELEPHONE ENCOUNTER
----- Message from Cristel Reyna sent at 2/4/2020  2:26 PM CST -----  The last a1c poct was not resulted under labs. Can you have someone please result the a1c of 7.2 from 01/30/2020? Thank you

## 2020-06-23 ENCOUNTER — TELEPHONE (OUTPATIENT)
Dept: FAMILY MEDICINE | Facility: CLINIC | Age: 60
End: 2020-06-23

## 2020-06-23 NOTE — TELEPHONE ENCOUNTER
----- Message from Farideh Klein sent at 6/23/2020  8:40 AM CDT -----  Pt was informed that her metformin was recalled. Please send in a new RX. [facility] on [address]. Pt #[phone]

## 2020-06-23 NOTE — TELEPHONE ENCOUNTER
Patient advised to contact pharmacy regarding  and lot number to determine is her medication is affected in recall.

## 2020-06-24 ENCOUNTER — PATIENT MESSAGE (OUTPATIENT)
Dept: FAMILY MEDICINE | Facility: CLINIC | Age: 60
End: 2020-06-24

## 2020-06-24 ENCOUNTER — TELEPHONE (OUTPATIENT)
Dept: FAMILY MEDICINE | Facility: CLINIC | Age: 60
End: 2020-06-24

## 2020-06-24 NOTE — TELEPHONE ENCOUNTER
----- Message from Geneva Scott sent at 6/24/2020 11:09 AM CDT -----  Regarding: Rx re-called  Contact: batool Jennings  Pt says that she called [facility] in regards to her metformin being recalled and [facility] told her that they would send over a fax . The patient says that she doesn't know what brand or any medication that would be better for her to be on other than the metformin she's currently on . She needs the nurse to call [facility] on [address] please# [phone]   Pt# [phone]

## 2020-06-25 ENCOUNTER — PATIENT MESSAGE (OUTPATIENT)
Dept: FAMILY MEDICINE | Facility: CLINIC | Age: 60
End: 2020-06-25

## 2020-06-25 DIAGNOSIS — E11.9 TYPE 2 DIABETES MELLITUS WITHOUT COMPLICATION, WITHOUT LONG-TERM CURRENT USE OF INSULIN: ICD-10-CM

## 2020-06-25 RX ORDER — METFORMIN HYDROCHLORIDE 750 MG/1
750 TABLET, EXTENDED RELEASE ORAL 2 TIMES DAILY
Qty: 180 TABLET | Refills: 1 | Status: SHIPPED | OUTPATIENT
Start: 2020-06-25 | End: 2020-07-30 | Stop reason: SDUPTHER

## 2020-07-15 LAB
LEFT EYE DM RETINOPATHY: NEGATIVE
RIGHT EYE DM RETINOPATHY: NEGATIVE

## 2020-07-30 ENCOUNTER — OFFICE VISIT (OUTPATIENT)
Dept: FAMILY MEDICINE | Facility: CLINIC | Age: 60
End: 2020-07-30
Payer: COMMERCIAL

## 2020-07-30 VITALS
HEIGHT: 62 IN | HEART RATE: 68 BPM | BODY MASS INDEX: 46.93 KG/M2 | TEMPERATURE: 98 F | DIASTOLIC BLOOD PRESSURE: 68 MMHG | SYSTOLIC BLOOD PRESSURE: 138 MMHG | WEIGHT: 255 LBS

## 2020-07-30 DIAGNOSIS — I10 ESSENTIAL HYPERTENSION: ICD-10-CM

## 2020-07-30 DIAGNOSIS — E11.9 TYPE 2 DIABETES MELLITUS WITHOUT COMPLICATION, WITHOUT LONG-TERM CURRENT USE OF INSULIN: ICD-10-CM

## 2020-07-30 LAB — HBA1C MFR BLD: 8.6 %

## 2020-07-30 PROCEDURE — 3075F SYST BP GE 130 - 139MM HG: CPT | Mod: S$GLB,,, | Performed by: PHYSICIAN ASSISTANT

## 2020-07-30 PROCEDURE — 83036 POCT HEMOGLOBIN A1C: ICD-10-PCS | Mod: QW,,, | Performed by: PHYSICIAN ASSISTANT

## 2020-07-30 PROCEDURE — 3078F DIAST BP <80 MM HG: CPT | Mod: S$GLB,,, | Performed by: PHYSICIAN ASSISTANT

## 2020-07-30 PROCEDURE — 83036 HEMOGLOBIN GLYCOSYLATED A1C: CPT | Mod: QW,,, | Performed by: PHYSICIAN ASSISTANT

## 2020-07-30 PROCEDURE — 3051F HG A1C>EQUAL 7.0%<8.0%: CPT | Mod: S$GLB,,, | Performed by: PHYSICIAN ASSISTANT

## 2020-07-30 PROCEDURE — 99214 PR OFFICE/OUTPT VISIT, EST, LEVL IV, 30-39 MIN: ICD-10-PCS | Mod: S$GLB,,, | Performed by: PHYSICIAN ASSISTANT

## 2020-07-30 PROCEDURE — 3051F PR MOST RECENT HEMOGLOBIN A1C LEVEL 7.0 - < 8.0%: ICD-10-PCS | Mod: S$GLB,,, | Performed by: PHYSICIAN ASSISTANT

## 2020-07-30 PROCEDURE — 3078F PR MOST RECENT DIASTOLIC BLOOD PRESSURE < 80 MM HG: ICD-10-PCS | Mod: S$GLB,,, | Performed by: PHYSICIAN ASSISTANT

## 2020-07-30 PROCEDURE — 3008F PR BODY MASS INDEX (BMI) DOCUMENTED: ICD-10-PCS | Mod: S$GLB,,, | Performed by: PHYSICIAN ASSISTANT

## 2020-07-30 PROCEDURE — 3008F BODY MASS INDEX DOCD: CPT | Mod: S$GLB,,, | Performed by: PHYSICIAN ASSISTANT

## 2020-07-30 PROCEDURE — 99214 OFFICE O/P EST MOD 30 MIN: CPT | Mod: S$GLB,,, | Performed by: PHYSICIAN ASSISTANT

## 2020-07-30 PROCEDURE — 3075F PR MOST RECENT SYSTOLIC BLOOD PRESS GE 130-139MM HG: ICD-10-PCS | Mod: S$GLB,,, | Performed by: PHYSICIAN ASSISTANT

## 2020-07-30 RX ORDER — VIT C/E/ZN/COPPR/LUTEIN/ZEAXAN 250MG-90MG
1000 CAPSULE ORAL DAILY
COMMUNITY
End: 2023-12-18

## 2020-07-30 RX ORDER — ATENOLOL AND CHLORTHALIDONE TABLET 50; 25 MG/1; MG/1
1 TABLET ORAL DAILY
Qty: 90 TABLET | Refills: 1 | Status: SHIPPED | OUTPATIENT
Start: 2020-07-30 | End: 2021-02-03 | Stop reason: SDUPTHER

## 2020-07-30 RX ORDER — POTASSIUM CHLORIDE 750 MG/1
10 CAPSULE, EXTENDED RELEASE ORAL ONCE
COMMUNITY
End: 2020-10-20 | Stop reason: SDUPTHER

## 2020-07-30 RX ORDER — METFORMIN HYDROCHLORIDE 750 MG/1
750 TABLET, EXTENDED RELEASE ORAL 2 TIMES DAILY
Qty: 180 TABLET | Refills: 1 | Status: SHIPPED | OUTPATIENT
Start: 2020-07-30 | End: 2021-04-27 | Stop reason: SDUPTHER

## 2020-07-30 RX ORDER — LOSARTAN POTASSIUM 25 MG/1
50 TABLET ORAL DAILY
Qty: 180 TABLET | Refills: 1 | Status: SHIPPED | OUTPATIENT
Start: 2020-07-30 | End: 2021-02-03 | Stop reason: SDUPTHER

## 2020-07-30 RX ORDER — MULTIVIT WITH MINERALS/HERBS
1 TABLET ORAL DAILY
COMMUNITY

## 2020-07-30 NOTE — PATIENT INSTRUCTIONS
Diet: Diabetes  Food is an important tool that you can use to control diabetes and stay healthy. Eating well-balanced meals in the correct amounts will help you control your blood glucose levels and prevent low blood sugar reactions. It will also help you reduce the health risks of diabetes. There is no one specific diet that is right for everyone with diabetes. But there are general guidelines to follow. A registered dietitian (RD) will create a tailored diet approach thats just right for you. He or she will also help you plan healthy meals and snacks. If you have any questions, call your dietitian for advice.     Guidelines for success  Talk with your healthcare provider before starting a diabetes diet or weight loss program. If you haven't talked with a dietitian yet, ask your provider for a referral. The following guidelines can help you succeed:  · Select foods from the 6 food groups below. Your dietitian will help you find food choices within each group. He or she will also show you serving sizes and how many servings you can have at each meal.  ¨ Grains, beans, and starchy vegetables  ¨ Vegetables  ¨ Fruit  ¨ Milk or yogurt  ¨ Meat, poultry, fish, or tofu  ¨ Healthy fats  · Check your blood sugar levels as directed by your provider. Take any medicine as prescribed by your provider.  · Learn to read food labels and pick the right portion sizes.  · Eat only the amount of food in your meal plan. Eat about the same amount of food at regular times each day. Dont skip meals. Eat meals 4 to 5 hours apart, with snacks in between.  · Limit alcohol. It raises blood sugar levels. Drink water or calorie-free diet drinks that use safe sweeteners.  · Eat less fat to help lower your risk of heart disease. Use nonfat or low-fat dairy products and lean meats. Avoid fried foods. Use cooking oils that are unsaturated, such as olive, canola, or peanut oil.  · Talk with your dietitian about safe sugar substitutes.  · Avoid  added salt. It can contribute to high blood pressure, which can cause heart disease. People with diabetes already have a risk of high blood pressure and heart disease.  · Stay at a healthy weight. If you need to lose weight, cut down on your portion sizes. But dont skip meals. Exercise is an important part of any weight management program. Talk with your provider about an exercise program thats right for you.  · For more information about the best diet plan for you, talk with a registered dietitian (RD). To find an RD in your area, contact:  ¨ Academy of Nutrition and Dietetics www.eatright.org  ¨ The American Diabetes Association 953-070-3785 www.diabetes.org  Date Last Reviewed: 8/1/2016 © 2000-2017 [employer]. [address] [place] [address], [place] [address]. All rights reserved. This information is not intended as a substitute for professional medical care. Always follow your healthcare professional's instructions.

## 2020-07-30 NOTE — PROGRESS NOTES
SUBJECTIVE:    Patient ID: Angelina Jennings is a 59 y.o. female.    Chief Complaint: Diabetes (brought bottles, Eye exam req [place]// SW)    59-year-old female presents today for 3 month diabetic check up.  She admits that she has not been doing the best lately with her blood sugar.  Had a bad yeast infection for the 2nd time and decided to stop Invokana.  Her blood sugar has spiked from 7.2% to 8.6 today.  She is open to intensifying therapy with ozempic.  Her blood pressure look stable and she does need refills of medications.      No visits with results within 6 Month(s) from this visit.   Latest known visit with results is:   Office Visit on 01/30/2020   Component Date Value Ref Range Status    Hemoglobin A1C 01/30/2020 7.2  % Final       Past Medical History:   Diagnosis Date    Diabetes mellitus, type 2     Hypertension      Past Surgical History:   Procedure Laterality Date    TONSILLECTOMY       History reviewed. No pertinent family history.    Marital Status:   Alcohol History:  reports current alcohol use.  Tobacco History:  reports that she quit smoking about 6 years ago. She has never used smokeless tobacco.  Drug History:  reports no history of drug use.    Review of patient's allergies indicates:  No Known Allergies    Current Outpatient Medications:     ascorbic acid, vitamin C, (VITAMIN C) 500 MG tablet, Take 500 mg by mouth once daily., Disp: , Rfl:     aspirin 81 MG Chew, Take 81 mg by mouth once daily. , Disp: , Rfl:     atenoloL-chlorthalidone (TENORETIC) 50-25 mg Tab, Take 1 tablet by mouth once daily., Disp: 90 tablet, Rfl: 1    b complex vitamins tablet, Take 1 tablet by mouth once daily., Disp: , Rfl:     blood sugar diagnostic (ONETOUCH ULTRA BLUE TEST STRIP) Strp, 30 strips by In Vitro route once daily. as directed, Disp: 30 strip, Rfl: 2    cholecalciferol, vitamin D3, (VITAMIN D3) 25 mcg (1,000 unit) capsule, Take 1,000 Units by mouth once daily., Disp: , Rfl:      "cyanocobalamin, vitamin B-12, 2,500 mcg Tab, 2,500 mcg once daily. , Disp: , Rfl:     losartan (COZAAR) 25 MG tablet, Take 2 tablets (50 mg total) by mouth once daily., Disp: 180 tablet, Rfl: 1    metFORMIN (GLUCOPHAGE-XR) 750 MG XR 24hr tablet, Take 1 tablet (750 mg total) by mouth 2 (two) times daily., Disp: 180 tablet, Rfl: 1    potassium chloride (MICRO-K) 10 MEQ CpSR, Take 10 mEq by mouth once. Take 3 capsules by mouth daily, Disp: , Rfl:     diclofenac sodium (VOLTAREN) 1 % Gel, Apply 2 g topically 4 (four) times daily., Disp: 100 g, Rfl: 2    Review of Systems   Constitutional: Negative for appetite change, chills, fatigue, fever and unexpected weight change.   HENT: Negative for congestion.    Respiratory: Negative for cough, chest tightness and shortness of breath.    Cardiovascular: Negative for chest pain and palpitations.   Gastrointestinal: Negative for abdominal distention and abdominal pain.   Endocrine: Negative for cold intolerance and heat intolerance.   Genitourinary: Negative for difficulty urinating and dysuria.   Musculoskeletal: Negative for arthralgias and back pain.   Neurological: Negative for dizziness, weakness and headaches.          Objective:      Vitals:    07/30/20 1521   BP: 138/68   Pulse: 68   Temp: 97.6 °F (36.4 °C)   Weight: 115.7 kg (255 lb)   Height: 5' 2.25" (1.581 m)     Physical Exam  Constitutional:       General: She is not in acute distress.     Appearance: She is well-developed.   HENT:      Head: Normocephalic and atraumatic.   Eyes:      General:         Right eye: No discharge.         Left eye: No discharge.      Conjunctiva/sclera: Conjunctivae normal.      Pupils: Pupils are equal, round, and reactive to light.   Neck:      Musculoskeletal: Normal range of motion and neck supple.      Thyroid: No thyromegaly.   Cardiovascular:      Rate and Rhythm: Normal rate and regular rhythm.      Heart sounds: Normal heart sounds.   Pulmonary:      Effort: Pulmonary " effort is normal. No respiratory distress.      Breath sounds: Normal breath sounds. No wheezing.   Chest:      Chest wall: No tenderness.   Abdominal:      General: Bowel sounds are normal.      Palpations: Abdomen is soft.           Assessment:       1. Essential hypertension    2. Type 2 diabetes mellitus without complication, without long-term current use of insulin         Plan:       Essential hypertension  Comments:  BP looks stable, continue as is.  Orders:  -     atenoloL-chlorthalidone (TENORETIC) 50-25 mg Tab; Take 1 tablet by mouth once daily.  Dispense: 90 tablet; Refill: 1  -     losartan (COZAAR) 25 MG tablet; Take 2 tablets (50 mg total) by mouth once daily.  Dispense: 180 tablet; Refill: 1    Type 2 diabetes mellitus without complication, without long-term current use of insulin  Comments:  A1c has spiked to 8.6%.  Patient is open to Ozempic.  She has been given samples to last her 6 weeks.  Educated on side effects. message for refills.  Orders:  -     metFORMIN (GLUCOPHAGE-XR) 750 MG XR 24hr tablet; Take 1 tablet (750 mg total) by mouth 2 (two) times daily.  Dispense: 180 tablet; Refill: 1  -     Hemoglobin A1C, POCT      Follow up in about 3 months (around 10/30/2020) for Diabetic Check-Up.        7/30/2020 Mendoza Hardy PA-C

## 2020-08-10 DIAGNOSIS — Z12.31 ENCOUNTER FOR SCREENING MAMMOGRAM FOR MALIGNANT NEOPLASM OF BREAST: ICD-10-CM

## 2020-08-10 DIAGNOSIS — Z78.0 MENOPAUSE: Primary | ICD-10-CM

## 2020-08-20 ENCOUNTER — HOSPITAL ENCOUNTER (OUTPATIENT)
Dept: RADIOLOGY | Facility: HOSPITAL | Age: 60
Discharge: HOME | End: 2020-08-20
Attending: SPECIALIST
Payer: COMMERCIAL

## 2020-08-20 VITALS — BODY MASS INDEX: 46.93 KG/M2 | HEIGHT: 62 IN | WEIGHT: 255.06 LBS

## 2020-08-20 DIAGNOSIS — Z12.31 ENCOUNTER FOR SCREENING MAMMOGRAM FOR MALIGNANT NEOPLASM OF BREAST: ICD-10-CM

## 2020-08-20 PROCEDURE — 77067 SCR MAMMO BI INCL CAD: CPT | Mod: TC,PO

## 2020-08-31 ENCOUNTER — TELEPHONE (OUTPATIENT)
Dept: FAMILY MEDICINE | Facility: CLINIC | Age: 60
End: 2020-08-31

## 2020-08-31 DIAGNOSIS — E11.9 TYPE 2 DIABETES MELLITUS WITHOUT COMPLICATION, WITHOUT LONG-TERM CURRENT USE OF INSULIN: Primary | ICD-10-CM

## 2020-08-31 RX ORDER — SEMAGLUTIDE 1.34 MG/ML
0.5 INJECTION, SOLUTION SUBCUTANEOUS
Qty: 1 SYRINGE | Refills: 5 | Status: SHIPPED | OUTPATIENT
Start: 2020-08-31 | End: 2021-02-18 | Stop reason: SDUPTHER

## 2020-08-31 NOTE — TELEPHONE ENCOUNTER
Yes, I am going to refill Ozempic to her pharmacy.  She will need to go online to [email or web address].[email or web address]  and get the coupon card.  She should pain no more than 10 dollars

## 2020-08-31 NOTE — TELEPHONE ENCOUNTER
----- Message from Jane Arredondo sent at 8/31/2020  9:23 AM CDT -----  Regarding: refills  Ozempic   Pharm [facility] [place]   Pt is mention that kit said something about a coupon   Pt [phone]

## 2020-10-20 ENCOUNTER — TELEPHONE (OUTPATIENT)
Dept: FAMILY MEDICINE | Facility: CLINIC | Age: 60
End: 2020-10-20

## 2020-10-20 RX ORDER — POTASSIUM CHLORIDE 750 MG/1
10 CAPSULE, EXTENDED RELEASE ORAL ONCE
Qty: 1 CAPSULE | Refills: 0 | Status: SHIPPED | OUTPATIENT
Start: 2020-10-20 | End: 2020-10-20 | Stop reason: SDUPTHER

## 2020-10-20 RX ORDER — POTASSIUM CHLORIDE 750 MG/1
30 CAPSULE, EXTENDED RELEASE ORAL DAILY
Qty: 270 CAPSULE | Refills: 1 | Status: SHIPPED | OUTPATIENT
Start: 2020-10-20 | End: 2021-04-18

## 2020-10-20 NOTE — TELEPHONE ENCOUNTER
----- Message from Geneva Scott sent at 10/20/2020 11:24 AM CDT -----  Regarding: Refill  Contact: Angelina Jennings  Needs refill on Postassium  Send to [facility] on [address]  Pt# [phone]

## 2020-10-20 NOTE — TELEPHONE ENCOUNTER
----- Message from Lilibeth Mckinney sent at 10/20/2020  2:05 PM CDT -----  Chang diamond with [facility] is calling about clarification potassium, directions do not match dispense  [phone]

## 2020-11-13 ENCOUNTER — OFFICE VISIT (OUTPATIENT)
Dept: FAMILY MEDICINE | Facility: CLINIC | Age: 60
End: 2020-11-13
Payer: COMMERCIAL

## 2020-11-13 VITALS
BODY MASS INDEX: 43.41 KG/M2 | HEART RATE: 76 BPM | TEMPERATURE: 98 F | WEIGHT: 245 LBS | DIASTOLIC BLOOD PRESSURE: 72 MMHG | HEIGHT: 63 IN | SYSTOLIC BLOOD PRESSURE: 136 MMHG

## 2020-11-13 DIAGNOSIS — E11.9 TYPE 2 DIABETES MELLITUS WITHOUT COMPLICATION, WITHOUT LONG-TERM CURRENT USE OF INSULIN: Primary | ICD-10-CM

## 2020-11-13 LAB — HBA1C MFR BLD: 6.9 %

## 2020-11-13 PROCEDURE — 99213 OFFICE O/P EST LOW 20 MIN: CPT | Mod: S$GLB,,, | Performed by: PHYSICIAN ASSISTANT

## 2020-11-13 PROCEDURE — 3078F DIAST BP <80 MM HG: CPT | Mod: S$GLB,,, | Performed by: PHYSICIAN ASSISTANT

## 2020-11-13 PROCEDURE — 99213 PR OFFICE/OUTPT VISIT, EST, LEVL III, 20-29 MIN: ICD-10-PCS | Mod: S$GLB,,, | Performed by: PHYSICIAN ASSISTANT

## 2020-11-13 PROCEDURE — 3044F PR MOST RECENT HEMOGLOBIN A1C LEVEL <7.0%: ICD-10-PCS | Mod: S$GLB,,, | Performed by: PHYSICIAN ASSISTANT

## 2020-11-13 PROCEDURE — 3075F SYST BP GE 130 - 139MM HG: CPT | Mod: S$GLB,,, | Performed by: PHYSICIAN ASSISTANT

## 2020-11-13 PROCEDURE — 83036 HEMOGLOBIN GLYCOSYLATED A1C: CPT | Mod: QW,,, | Performed by: PHYSICIAN ASSISTANT

## 2020-11-13 PROCEDURE — 3078F PR MOST RECENT DIASTOLIC BLOOD PRESSURE < 80 MM HG: ICD-10-PCS | Mod: S$GLB,,, | Performed by: PHYSICIAN ASSISTANT

## 2020-11-13 PROCEDURE — 3044F HG A1C LEVEL LT 7.0%: CPT | Mod: S$GLB,,, | Performed by: PHYSICIAN ASSISTANT

## 2020-11-13 PROCEDURE — 83036 POCT HEMOGLOBIN A1C: ICD-10-PCS | Mod: QW,,, | Performed by: PHYSICIAN ASSISTANT

## 2020-11-13 PROCEDURE — 3008F PR BODY MASS INDEX (BMI) DOCUMENTED: ICD-10-PCS | Mod: S$GLB,,, | Performed by: PHYSICIAN ASSISTANT

## 2020-11-13 PROCEDURE — 3075F PR MOST RECENT SYSTOLIC BLOOD PRESS GE 130-139MM HG: ICD-10-PCS | Mod: S$GLB,,, | Performed by: PHYSICIAN ASSISTANT

## 2020-11-13 PROCEDURE — 3008F BODY MASS INDEX DOCD: CPT | Mod: S$GLB,,, | Performed by: PHYSICIAN ASSISTANT

## 2020-11-13 NOTE — PROGRESS NOTES
SUBJECTIVE:    Patient ID: Angelina Jennings is a 60 y.o. female.    Chief Complaint: Diabetes (no complaints. brought bottles. -MJ)    This is a 60-year-old female who presents today for regular 3 month diabetic check up.  Her A1c had risen to 8.6% from 7.2 at the last visit.  I did start her on ozempic therapy.  She initially had samples and then we sent the prescription to the pharmacy.  Today she reports that she has done great! On 0.5mg dose. Down 10 lbs. A1c is now at: 6.9%.      Office Visit on 11/13/2020   Component Date Value Ref Range Status    Hemoglobin A1C 11/13/2020 6.9  % Final   Office Visit on 07/30/2020   Component Date Value Ref Range Status    Hemoglobin A1C 07/30/2020 8.6  % Final       Past Medical History:   Diagnosis Date    Diabetes mellitus, type 2     Hypertension      Past Surgical History:   Procedure Laterality Date    TONSILLECTOMY       History reviewed. No pertinent family history.    Marital Status:   Alcohol History:  reports current alcohol use.  Tobacco History:  reports that she quit smoking about 7 years ago. She has never used smokeless tobacco.  Drug History:  reports no history of drug use.    Review of patient's allergies indicates:  No Known Allergies    Current Outpatient Medications:     ascorbic acid, vitamin C, (VITAMIN C) 500 MG tablet, Take 500 mg by mouth once daily., Disp: , Rfl:     aspirin 81 MG Chew, Take 81 mg by mouth once daily. , Disp: , Rfl:     atenoloL-chlorthalidone (TENORETIC) 50-25 mg Tab, Take 1 tablet by mouth once daily., Disp: 90 tablet, Rfl: 1    b complex vitamins tablet, Take 1 tablet by mouth once daily., Disp: , Rfl:     cholecalciferol, vitamin D3, (VITAMIN D3) 25 mcg (1,000 unit) capsule, Take 1,000 Units by mouth once daily., Disp: , Rfl:     cyanocobalamin, vitamin B-12, 2,500 mcg Tab, 2,500 mcg once daily. , Disp: , Rfl:     diclofenac sodium (VOLTAREN) 1 % Gel, Apply 2 g topically 4 (four) times daily., Disp: 100 g, Rfl:  "2    losartan (COZAAR) 25 MG tablet, Take 2 tablets (50 mg total) by mouth once daily., Disp: 180 tablet, Rfl: 1    metFORMIN (GLUCOPHAGE-XR) 750 MG XR 24hr tablet, Take 1 tablet (750 mg total) by mouth 2 (two) times daily., Disp: 180 tablet, Rfl: 1    potassium chloride (MICRO-K) 10 MEQ CpSR, Take 3 capsules (30 mEq total) by mouth once daily. Take 3 capsules by mouth daily  for 180 doses, Disp: 270 capsule, Rfl: 1    semaglutide (OZEMPIC) 0.25 mg or 0.5 mg(2 mg/1.5 mL) PnIj, Inject 0.5 mg into the skin every 7 days., Disp: 1 Syringe, Rfl: 5    blood sugar diagnostic (ONETOUCH ULTRA BLUE TEST STRIP) Strp, 30 strips by In Vitro route once daily. as directed, Disp: 30 strip, Rfl: 2    Review of Systems   Constitutional: Negative for appetite change, chills, fatigue, fever and unexpected weight change.   HENT: Negative for congestion.    Respiratory: Negative for cough, chest tightness and shortness of breath.    Cardiovascular: Negative for chest pain and palpitations.   Gastrointestinal: Negative for abdominal distention and abdominal pain.   Endocrine: Negative for cold intolerance and heat intolerance.   Genitourinary: Negative for difficulty urinating and dysuria.   Musculoskeletal: Negative for arthralgias and back pain.   Neurological: Negative for dizziness, weakness and headaches.          Objective:      Vitals:    11/13/20 0814   BP: 136/72   Pulse: 76   Temp: 98.1 °F (36.7 °C)   Weight: 111.1 kg (245 lb)   Height: 5' 2.5" (1.588 m)     Physical Exam  Constitutional:       General: She is not in acute distress.     Appearance: She is well-developed.   HENT:      Head: Normocephalic and atraumatic.   Eyes:      Conjunctiva/sclera: Conjunctivae normal.      Pupils: Pupils are equal, round, and reactive to light.   Neck:      Musculoskeletal: Normal range of motion and neck supple.      Thyroid: No thyromegaly.   Cardiovascular:      Rate and Rhythm: Normal rate and regular rhythm.      Heart sounds: " Normal heart sounds.   Pulmonary:      Effort: Pulmonary effort is normal.      Breath sounds: Normal breath sounds.   Abdominal:      General: Bowel sounds are normal. There is no distension.      Palpations: Abdomen is soft.      Tenderness: There is no abdominal tenderness.   Musculoskeletal: Normal range of motion.   Skin:     General: Skin is warm and dry.      Findings: No erythema.   Neurological:      Mental Status: She is alert and oriented to person, place, and time.      Cranial Nerves: No cranial nerve deficit.           Assessment:       1. Type 2 diabetes mellitus without complication, without long-term current use of insulin         Plan:       Type 2 diabetes mellitus without complication, without long-term current use of insulin  Comments:  A1c down to 6.9% today!! Doing awesome with ozempic. continue as is.  Orders:  -     Hemoglobin A1C, POCT      Follow up in about 3 months (around 2/13/2021) for Diabetic Check-Up.        11/13/2020 Mendoza Hardy PA-C

## 2020-11-13 NOTE — PATIENT INSTRUCTIONS
Diet: Diabetes  Food is an important tool that you can use to control diabetes and stay healthy. Eating well-balanced meals in the correct amounts will help you control your blood glucose levels and prevent low blood sugar reactions. It will also help you reduce the health risks of diabetes. There is no one specific diet that is right for everyone with diabetes. But there are general guidelines to follow. A registered dietitian (RD) will create a tailored diet approach thats just right for you. He or she will also help you plan healthy meals and snacks. If you have any questions, call your dietitian for advice.     Guidelines for success  Talk with your healthcare provider before starting a diabetes diet or weight loss program. If you haven't talked with a dietitian yet, ask your provider for a referral. The following guidelines can help you succeed:  · Select foods from the 6 food groups below. Your dietitian will help you find food choices within each group. He or she will also show you serving sizes and how many servings you can have at each meal.  ¨ Grains, beans, and starchy vegetables  ¨ Vegetables  ¨ Fruit  ¨ Milk or yogurt  ¨ Meat, poultry, fish, or tofu  ¨ Healthy fats  · Check your blood sugar levels as directed by your provider. Take any medicine as prescribed by your provider.  · Learn to read food labels and pick the right portion sizes.  · Eat only the amount of food in your meal plan. Eat about the same amount of food at regular times each day. Dont skip meals. Eat meals 4 to 5 hours apart, with snacks in between.  · Limit alcohol. It raises blood sugar levels. Drink water or calorie-free diet drinks that use safe sweeteners.  · Eat less fat to help lower your risk of heart disease. Use nonfat or low-fat dairy products and lean meats. Avoid fried foods. Use cooking oils that are unsaturated, such as olive, canola, or peanut oil.  · Talk with your dietitian about safe sugar substitutes.  · Avoid  added salt. It can contribute to high blood pressure, which can cause heart disease. People with diabetes already have a risk of high blood pressure and heart disease.  · Stay at a healthy weight. If you need to lose weight, cut down on your portion sizes. But dont skip meals. Exercise is an important part of any weight management program. Talk with your provider about an exercise program thats right for you.  · For more information about the best diet plan for you, talk with a registered dietitian (RD). To find an RD in your area, contact:  ¨ Academy of Nutrition and Dietetics www.eatright.org  ¨ The American Diabetes Association [phone] www.diabetes.org  Date Last Reviewed: 8/1/2016 © 2000-2017 [employer]. [address] [place] [address], [place] [address]. All rights reserved. This information is not intended as a substitute for professional medical care. Always follow your healthcare professional's instructions.

## 2021-02-03 DIAGNOSIS — Z79.899 ENCOUNTER FOR LONG-TERM (CURRENT) USE OF OTHER MEDICATIONS: Primary | ICD-10-CM

## 2021-02-03 DIAGNOSIS — I10 ESSENTIAL HYPERTENSION: ICD-10-CM

## 2021-02-03 DIAGNOSIS — E11.9 NON-INSULIN DEPENDENT TYPE 2 DIABETES MELLITUS: ICD-10-CM

## 2021-02-03 RX ORDER — ATENOLOL AND CHLORTHALIDONE TABLET 50; 25 MG/1; MG/1
1 TABLET ORAL DAILY
Qty: 90 TABLET | Refills: 1 | Status: SHIPPED | OUTPATIENT
Start: 2021-02-03 | End: 2021-02-18 | Stop reason: SDUPTHER

## 2021-02-03 RX ORDER — LOSARTAN POTASSIUM 25 MG/1
50 TABLET ORAL DAILY
Qty: 180 TABLET | Refills: 1 | Status: SHIPPED | OUTPATIENT
Start: 2021-02-03 | End: 2021-04-27 | Stop reason: SDUPTHER

## 2021-02-18 DIAGNOSIS — I10 ESSENTIAL HYPERTENSION: ICD-10-CM

## 2021-02-18 DIAGNOSIS — E11.9 TYPE 2 DIABETES MELLITUS WITHOUT COMPLICATION, WITHOUT LONG-TERM CURRENT USE OF INSULIN: ICD-10-CM

## 2021-02-18 RX ORDER — SEMAGLUTIDE 1.34 MG/ML
0.5 INJECTION, SOLUTION SUBCUTANEOUS
Qty: 1 SYRINGE | Refills: 5 | Status: SHIPPED | OUTPATIENT
Start: 2021-02-18 | End: 2021-04-27 | Stop reason: SDUPTHER

## 2021-02-18 RX ORDER — ATENOLOL AND CHLORTHALIDONE TABLET 50; 25 MG/1; MG/1
1 TABLET ORAL DAILY
Qty: 90 TABLET | Refills: 1 | Status: SHIPPED | OUTPATIENT
Start: 2021-02-18 | End: 2021-04-27 | Stop reason: SDUPTHER

## 2021-02-25 LAB
ALBUMIN SERPL-MCNC: 4.4 G/DL (ref 3.6–5.1)
ALBUMIN/CREAT UR: 54 MCG/MG CREAT
ALBUMIN/GLOB SERPL: 1.6 (CALC) (ref 1–2.5)
ALP SERPL-CCNC: 76 U/L (ref 37–153)
ALT SERPL-CCNC: 28 U/L (ref 6–29)
APPEARANCE UR: CLEAR
AST SERPL-CCNC: 18 U/L (ref 10–35)
BACTERIA #/AREA URNS HPF: ABNORMAL /HPF
BACTERIA UR CULT: ABNORMAL
BASOPHILS # BLD AUTO: 64 CELLS/UL (ref 0–200)
BASOPHILS NFR BLD AUTO: 0.7 %
BILIRUB SERPL-MCNC: 0.4 MG/DL (ref 0.2–1.2)
BILIRUB UR QL STRIP: NEGATIVE
BUN SERPL-MCNC: 13 MG/DL (ref 7–25)
BUN/CREAT SERPL: ABNORMAL (CALC) (ref 6–22)
CALCIUM SERPL-MCNC: 10.2 MG/DL (ref 8.6–10.4)
CHLORIDE SERPL-SCNC: 93 MMOL/L (ref 98–110)
CHOLEST SERPL-MCNC: 215 MG/DL
CHOLEST/HDLC SERPL: 3.3 (CALC)
CO2 SERPL-SCNC: 34 MMOL/L (ref 20–32)
COLOR UR: ABNORMAL
CREAT SERPL-MCNC: 0.59 MG/DL (ref 0.5–0.99)
CREAT UR-MCNC: 56 MG/DL (ref 20–275)
EOSINOPHIL # BLD AUTO: 230 CELLS/UL (ref 15–500)
EOSINOPHIL NFR BLD AUTO: 2.5 %
ERYTHROCYTE [DISTWIDTH] IN BLOOD BY AUTOMATED COUNT: 13.7 % (ref 11–15)
GFRSERPLBLD MDRD-ARVRAT: 100 ML/MIN/1.73M2
GLOBULIN SER CALC-MCNC: 2.8 G/DL (CALC) (ref 1.9–3.7)
GLUCOSE SERPL-MCNC: 144 MG/DL (ref 65–99)
GLUCOSE UR QL STRIP: NEGATIVE
HBA1C MFR BLD: 6.9 % OF TOTAL HGB
HCT VFR BLD AUTO: 45.5 % (ref 35–45)
HDLC SERPL-MCNC: 65 MG/DL
HGB BLD-MCNC: 15.2 G/DL (ref 11.7–15.5)
HGB UR QL STRIP: NEGATIVE
HYALINE CASTS #/AREA URNS LPF: ABNORMAL /LPF
KETONES UR QL STRIP: NEGATIVE
LDLC SERPL CALC-MCNC: 116 MG/DL (CALC)
LEUKOCYTE ESTERASE UR QL STRIP: NEGATIVE
LYMPHOCYTES # BLD AUTO: 2291 CELLS/UL (ref 850–3900)
LYMPHOCYTES NFR BLD AUTO: 24.9 %
MCH RBC QN AUTO: 29.4 PG (ref 27–33)
MCHC RBC AUTO-ENTMCNC: 33.4 G/DL (ref 32–36)
MCV RBC AUTO: 88 FL (ref 80–100)
MICROALBUMIN UR-MCNC: 3 MG/DL
MONOCYTES # BLD AUTO: 616 CELLS/UL (ref 200–950)
MONOCYTES NFR BLD AUTO: 6.7 %
NEUTROPHILS # BLD AUTO: 5998 CELLS/UL (ref 1500–7800)
NEUTROPHILS NFR BLD AUTO: 65.2 %
NITRITE UR QL STRIP: POSITIVE
NONHDLC SERPL-MCNC: 150 MG/DL (CALC)
PH UR STRIP: ABNORMAL [PH] (ref 5–8)
PLATELET # BLD AUTO: 389 THOUSAND/UL (ref 140–400)
PMV BLD REES-ECKER: 9.5 FL (ref 7.5–12.5)
POTASSIUM SERPL-SCNC: 4.1 MMOL/L (ref 3.5–5.3)
PROT SERPL-MCNC: 7.2 G/DL (ref 6.1–8.1)
PROT UR QL STRIP: NEGATIVE
RBC # BLD AUTO: 5.17 MILLION/UL (ref 3.8–5.1)
RBC #/AREA URNS HPF: ABNORMAL /HPF
SODIUM SERPL-SCNC: 137 MMOL/L (ref 135–146)
SP GR UR STRIP: 1.02 (ref 1–1.03)
SQUAMOUS #/AREA URNS HPF: ABNORMAL /HPF
TRIGL SERPL-MCNC: 215 MG/DL
TSH SERPL-ACNC: 2.37 MIU/L (ref 0.4–4.5)
WBC # BLD AUTO: 9.2 THOUSAND/UL (ref 3.8–10.8)
WBC #/AREA URNS HPF: ABNORMAL /HPF

## 2021-02-26 ENCOUNTER — TELEPHONE (OUTPATIENT)
Dept: FAMILY MEDICINE | Facility: CLINIC | Age: 61
End: 2021-02-26

## 2021-03-19 ENCOUNTER — TELEPHONE (OUTPATIENT)
Dept: FAMILY MEDICINE | Facility: CLINIC | Age: 61
End: 2021-03-19

## 2021-04-27 ENCOUNTER — OFFICE VISIT (OUTPATIENT)
Dept: FAMILY MEDICINE | Facility: CLINIC | Age: 61
End: 2021-04-27
Payer: COMMERCIAL

## 2021-04-27 VITALS
HEIGHT: 63 IN | HEART RATE: 72 BPM | WEIGHT: 244 LBS | SYSTOLIC BLOOD PRESSURE: 136 MMHG | BODY MASS INDEX: 43.23 KG/M2 | DIASTOLIC BLOOD PRESSURE: 76 MMHG

## 2021-04-27 DIAGNOSIS — M79.10 MYALGIA DUE TO STATIN: ICD-10-CM

## 2021-04-27 DIAGNOSIS — I10 ESSENTIAL HYPERTENSION: ICD-10-CM

## 2021-04-27 DIAGNOSIS — T46.6X5A MYALGIA DUE TO STATIN: ICD-10-CM

## 2021-04-27 DIAGNOSIS — E11.9 TYPE 2 DIABETES MELLITUS WITHOUT COMPLICATION, WITHOUT LONG-TERM CURRENT USE OF INSULIN: Primary | ICD-10-CM

## 2021-04-27 LAB — HBA1C MFR BLD: 7.1 %

## 2021-04-27 PROCEDURE — 83036 POCT HEMOGLOBIN A1C: ICD-10-PCS | Mod: QW,,, | Performed by: PHYSICIAN ASSISTANT

## 2021-04-27 PROCEDURE — 3008F PR BODY MASS INDEX (BMI) DOCUMENTED: ICD-10-PCS | Mod: S$GLB,,, | Performed by: PHYSICIAN ASSISTANT

## 2021-04-27 PROCEDURE — 83036 HEMOGLOBIN GLYCOSYLATED A1C: CPT | Mod: QW,,, | Performed by: PHYSICIAN ASSISTANT

## 2021-04-27 PROCEDURE — 99214 PR OFFICE/OUTPT VISIT, EST, LEVL IV, 30-39 MIN: ICD-10-PCS | Mod: S$GLB,,, | Performed by: PHYSICIAN ASSISTANT

## 2021-04-27 PROCEDURE — 3008F BODY MASS INDEX DOCD: CPT | Mod: S$GLB,,, | Performed by: PHYSICIAN ASSISTANT

## 2021-04-27 PROCEDURE — 3044F PR MOST RECENT HEMOGLOBIN A1C LEVEL <7.0%: ICD-10-PCS | Mod: S$GLB,,, | Performed by: PHYSICIAN ASSISTANT

## 2021-04-27 PROCEDURE — 99214 OFFICE O/P EST MOD 30 MIN: CPT | Mod: S$GLB,,, | Performed by: PHYSICIAN ASSISTANT

## 2021-04-27 PROCEDURE — 3075F SYST BP GE 130 - 139MM HG: CPT | Mod: S$GLB,,, | Performed by: PHYSICIAN ASSISTANT

## 2021-04-27 PROCEDURE — 3078F DIAST BP <80 MM HG: CPT | Mod: S$GLB,,, | Performed by: PHYSICIAN ASSISTANT

## 2021-04-27 PROCEDURE — 3075F PR MOST RECENT SYSTOLIC BLOOD PRESS GE 130-139MM HG: ICD-10-PCS | Mod: S$GLB,,, | Performed by: PHYSICIAN ASSISTANT

## 2021-04-27 PROCEDURE — 3044F HG A1C LEVEL LT 7.0%: CPT | Mod: S$GLB,,, | Performed by: PHYSICIAN ASSISTANT

## 2021-04-27 PROCEDURE — 3078F PR MOST RECENT DIASTOLIC BLOOD PRESSURE < 80 MM HG: ICD-10-PCS | Mod: S$GLB,,, | Performed by: PHYSICIAN ASSISTANT

## 2021-04-27 RX ORDER — METFORMIN HYDROCHLORIDE 750 MG/1
750 TABLET, EXTENDED RELEASE ORAL 2 TIMES DAILY
Qty: 180 TABLET | Refills: 1 | Status: SHIPPED | OUTPATIENT
Start: 2021-04-27 | End: 2021-11-01 | Stop reason: SDUPTHER

## 2021-04-27 RX ORDER — LOSARTAN POTASSIUM 25 MG/1
50 TABLET ORAL DAILY
Qty: 180 TABLET | Refills: 1 | Status: SHIPPED | OUTPATIENT
Start: 2021-04-27 | End: 2021-11-01 | Stop reason: DRUGHIGH

## 2021-04-27 RX ORDER — POTASSIUM CHLORIDE 750 MG/1
CAPSULE, EXTENDED RELEASE ORAL
COMMUNITY
End: 2021-04-27 | Stop reason: SDUPTHER

## 2021-04-27 RX ORDER — ATENOLOL AND CHLORTHALIDONE TABLET 50; 25 MG/1; MG/1
1 TABLET ORAL DAILY
Qty: 90 TABLET | Refills: 1 | Status: SHIPPED | OUTPATIENT
Start: 2021-04-27 | End: 2021-11-01 | Stop reason: SDUPTHER

## 2021-04-27 RX ORDER — POTASSIUM CHLORIDE 750 MG/1
CAPSULE, EXTENDED RELEASE ORAL
Qty: 270 CAPSULE | Refills: 1 | Status: SHIPPED | OUTPATIENT
Start: 2021-04-27 | End: 2021-10-21 | Stop reason: SDUPTHER

## 2021-04-27 RX ORDER — SEMAGLUTIDE 1.34 MG/ML
0.5 INJECTION, SOLUTION SUBCUTANEOUS
Qty: 1 PEN | Refills: 5 | Status: SHIPPED | OUTPATIENT
Start: 2021-04-27 | End: 2021-10-21 | Stop reason: SDUPTHER

## 2021-09-09 ENCOUNTER — TELEPHONE (OUTPATIENT)
Dept: FAMILY MEDICINE | Facility: CLINIC | Age: 61
End: 2021-09-09

## 2021-09-09 DIAGNOSIS — Z12.31 SCREENING MAMMOGRAM FOR HIGH-RISK PATIENT: Primary | ICD-10-CM

## 2021-09-16 ENCOUNTER — HOSPITAL ENCOUNTER (OUTPATIENT)
Dept: RADIOLOGY | Facility: CLINIC | Age: 61
Discharge: HOME | End: 2021-09-16
Attending: NURSE PRACTITIONER
Payer: COMMERCIAL

## 2021-09-16 DIAGNOSIS — Z12.31 SCREENING MAMMOGRAM FOR HIGH-RISK PATIENT: ICD-10-CM

## 2021-09-16 PROCEDURE — 77067 MAMMO DIGITAL SCREENING BILAT WITH TOMO: ICD-10-PCS | Mod: 26,,, | Performed by: RADIOLOGY

## 2021-09-16 PROCEDURE — 77063 MAMMO DIGITAL SCREENING BILAT WITH TOMO: ICD-10-PCS | Mod: 26,,, | Performed by: RADIOLOGY

## 2021-09-16 PROCEDURE — 77067 SCR MAMMO BI INCL CAD: CPT | Mod: TC,PO

## 2021-09-16 PROCEDURE — 77063 BREAST TOMOSYNTHESIS BI: CPT | Mod: 26,,, | Performed by: RADIOLOGY

## 2021-09-16 PROCEDURE — 77067 SCR MAMMO BI INCL CAD: CPT | Mod: 26,,, | Performed by: RADIOLOGY

## 2021-09-20 ENCOUNTER — TELEPHONE (OUTPATIENT)
Dept: FAMILY MEDICINE | Facility: CLINIC | Age: 61
End: 2021-09-20

## 2021-10-13 LAB
LEFT EYE DM RETINOPATHY: NEGATIVE
RIGHT EYE DM RETINOPATHY: NEGATIVE

## 2021-10-14 DIAGNOSIS — Z78.0 ASYMPTOMATIC MENOPAUSAL STATE: Primary | ICD-10-CM

## 2021-10-18 ENCOUNTER — HOSPITAL ENCOUNTER (OUTPATIENT)
Dept: RADIOLOGY | Facility: HOSPITAL | Age: 61
Discharge: HOME | End: 2021-10-18
Attending: SPECIALIST
Payer: COMMERCIAL

## 2021-10-18 DIAGNOSIS — Z78.0 ASYMPTOMATIC MENOPAUSAL STATE: ICD-10-CM

## 2021-10-18 PROCEDURE — 77080 DXA BONE DENSITY AXIAL: CPT | Mod: TC,PO

## 2021-10-21 ENCOUNTER — PATIENT MESSAGE (OUTPATIENT)
Dept: FAMILY MEDICINE | Facility: CLINIC | Age: 61
End: 2021-10-21
Payer: COMMERCIAL

## 2021-10-21 DIAGNOSIS — E11.9 TYPE 2 DIABETES MELLITUS WITHOUT COMPLICATION, WITHOUT LONG-TERM CURRENT USE OF INSULIN: ICD-10-CM

## 2021-10-21 RX ORDER — SEMAGLUTIDE 1.34 MG/ML
0.5 INJECTION, SOLUTION SUBCUTANEOUS
Qty: 1 PEN | Refills: 5 | Status: SHIPPED | OUTPATIENT
Start: 2021-10-21 | End: 2021-11-01 | Stop reason: SDUPTHER

## 2021-10-21 RX ORDER — POTASSIUM CHLORIDE 750 MG/1
CAPSULE, EXTENDED RELEASE ORAL
Qty: 270 CAPSULE | Refills: 1 | Status: SHIPPED | OUTPATIENT
Start: 2021-10-21 | End: 2021-11-01 | Stop reason: SDUPTHER

## 2021-11-01 ENCOUNTER — OFFICE VISIT (OUTPATIENT)
Dept: FAMILY MEDICINE | Facility: CLINIC | Age: 61
End: 2021-11-01
Payer: COMMERCIAL

## 2021-11-01 VITALS
BODY MASS INDEX: 43.77 KG/M2 | WEIGHT: 247 LBS | HEART RATE: 72 BPM | DIASTOLIC BLOOD PRESSURE: 80 MMHG | HEIGHT: 63 IN | SYSTOLIC BLOOD PRESSURE: 136 MMHG

## 2021-11-01 DIAGNOSIS — E11.9 TYPE 2 DIABETES MELLITUS WITHOUT COMPLICATION, WITHOUT LONG-TERM CURRENT USE OF INSULIN: Primary | ICD-10-CM

## 2021-11-01 DIAGNOSIS — M79.10 MYALGIA DUE TO STATIN: ICD-10-CM

## 2021-11-01 DIAGNOSIS — I10 ESSENTIAL HYPERTENSION: ICD-10-CM

## 2021-11-01 DIAGNOSIS — T46.6X5A MYALGIA DUE TO STATIN: ICD-10-CM

## 2021-11-01 DIAGNOSIS — E11.9 TYPE 2 DIABETES MELLITUS WITHOUT COMPLICATION, WITHOUT LONG-TERM CURRENT USE OF INSULIN: ICD-10-CM

## 2021-11-01 DIAGNOSIS — Z13.89 SCREENING FOR BLOOD OR PROTEIN IN URINE: ICD-10-CM

## 2021-11-01 DIAGNOSIS — M62.838 MUSCLE SPASMS OF NECK: ICD-10-CM

## 2021-11-01 DIAGNOSIS — Z12.11 SPECIAL SCREENING FOR MALIGNANT NEOPLASMS, COLON: ICD-10-CM

## 2021-11-01 LAB — HBA1C MFR BLD: 7.5 %

## 2021-11-01 PROCEDURE — 83036 POCT HEMOGLOBIN A1C: ICD-10-PCS | Mod: QW,,, | Performed by: PHYSICIAN ASSISTANT

## 2021-11-01 PROCEDURE — 3066F NEPHROPATHY DOC TX: CPT | Mod: S$GLB,,, | Performed by: PHYSICIAN ASSISTANT

## 2021-11-01 PROCEDURE — 3079F DIAST BP 80-89 MM HG: CPT | Mod: S$GLB,,, | Performed by: PHYSICIAN ASSISTANT

## 2021-11-01 PROCEDURE — 3051F PR MOST RECENT HEMOGLOBIN A1C LEVEL 7.0 - < 8.0%: ICD-10-PCS | Mod: S$GLB,,, | Performed by: PHYSICIAN ASSISTANT

## 2021-11-01 PROCEDURE — 3075F SYST BP GE 130 - 139MM HG: CPT | Mod: S$GLB,,, | Performed by: PHYSICIAN ASSISTANT

## 2021-11-01 PROCEDURE — 4010F ACE/ARB THERAPY RXD/TAKEN: CPT | Mod: S$GLB,,, | Performed by: PHYSICIAN ASSISTANT

## 2021-11-01 PROCEDURE — 4010F PR ACE/ARB THEARPY RXD/TAKEN: ICD-10-PCS | Mod: S$GLB,,, | Performed by: PHYSICIAN ASSISTANT

## 2021-11-01 PROCEDURE — 3075F PR MOST RECENT SYSTOLIC BLOOD PRESS GE 130-139MM HG: ICD-10-PCS | Mod: S$GLB,,, | Performed by: PHYSICIAN ASSISTANT

## 2021-11-01 PROCEDURE — 99214 PR OFFICE/OUTPT VISIT, EST, LEVL IV, 30-39 MIN: ICD-10-PCS | Mod: S$GLB,,, | Performed by: PHYSICIAN ASSISTANT

## 2021-11-01 PROCEDURE — 3060F PR POS MICROALBUMINURIA RESULT DOCUMENTED/REVIEW: ICD-10-PCS | Mod: S$GLB,,, | Performed by: PHYSICIAN ASSISTANT

## 2021-11-01 PROCEDURE — 3008F PR BODY MASS INDEX (BMI) DOCUMENTED: ICD-10-PCS | Mod: S$GLB,,, | Performed by: PHYSICIAN ASSISTANT

## 2021-11-01 PROCEDURE — 99214 OFFICE O/P EST MOD 30 MIN: CPT | Mod: S$GLB,,, | Performed by: PHYSICIAN ASSISTANT

## 2021-11-01 PROCEDURE — 3008F BODY MASS INDEX DOCD: CPT | Mod: S$GLB,,, | Performed by: PHYSICIAN ASSISTANT

## 2021-11-01 PROCEDURE — 3079F PR MOST RECENT DIASTOLIC BLOOD PRESSURE 80-89 MM HG: ICD-10-PCS | Mod: S$GLB,,, | Performed by: PHYSICIAN ASSISTANT

## 2021-11-01 PROCEDURE — 3060F POS MICROALBUMINURIA REV: CPT | Mod: S$GLB,,, | Performed by: PHYSICIAN ASSISTANT

## 2021-11-01 PROCEDURE — 3051F HG A1C>EQUAL 7.0%<8.0%: CPT | Mod: S$GLB,,, | Performed by: PHYSICIAN ASSISTANT

## 2021-11-01 PROCEDURE — 3066F PR DOCUMENTATION OF TREATMENT FOR NEPHROPATHY: ICD-10-PCS | Mod: S$GLB,,, | Performed by: PHYSICIAN ASSISTANT

## 2021-11-01 PROCEDURE — 1160F RVW MEDS BY RX/DR IN RCRD: CPT | Mod: S$GLB,,, | Performed by: PHYSICIAN ASSISTANT

## 2021-11-01 PROCEDURE — 83036 HEMOGLOBIN GLYCOSYLATED A1C: CPT | Mod: QW,,, | Performed by: PHYSICIAN ASSISTANT

## 2021-11-01 PROCEDURE — 1160F PR REVIEW ALL MEDS BY PRESCRIBER/CLIN PHARMACIST DOCUMENTED: ICD-10-PCS | Mod: S$GLB,,, | Performed by: PHYSICIAN ASSISTANT

## 2021-11-01 RX ORDER — ATENOLOL AND CHLORTHALIDONE TABLET 50; 25 MG/1; MG/1
1 TABLET ORAL DAILY
Qty: 90 TABLET | Refills: 1 | Status: SHIPPED | OUTPATIENT
Start: 2021-11-01 | End: 2022-02-01 | Stop reason: SDUPTHER

## 2021-11-01 RX ORDER — LOSARTAN POTASSIUM 50 MG/1
50 TABLET ORAL DAILY
Qty: 90 TABLET | Refills: 1 | Status: SHIPPED | OUTPATIENT
Start: 2021-11-01 | End: 2022-02-01 | Stop reason: SDUPTHER

## 2021-11-01 RX ORDER — POTASSIUM CHLORIDE 750 MG/1
CAPSULE, EXTENDED RELEASE ORAL
Qty: 270 CAPSULE | Refills: 1 | Status: SHIPPED | OUTPATIENT
Start: 2021-11-01 | End: 2022-02-01 | Stop reason: SDUPTHER

## 2021-11-01 RX ORDER — ZINC GLUCONATE 50 MG
50 TABLET ORAL DAILY
COMMUNITY

## 2021-11-01 RX ORDER — SEMAGLUTIDE 1.34 MG/ML
0.5 INJECTION, SOLUTION SUBCUTANEOUS
Qty: 1 PEN | Refills: 5 | Status: SHIPPED | OUTPATIENT
Start: 2021-11-01 | End: 2022-02-01 | Stop reason: DRUGHIGH

## 2021-11-01 RX ORDER — CYCLOBENZAPRINE HCL 5 MG
5 TABLET ORAL 3 TIMES DAILY PRN
Qty: 30 TABLET | Refills: 0 | Status: SHIPPED | OUTPATIENT
Start: 2021-11-01 | End: 2021-11-11

## 2021-11-01 RX ORDER — LOSARTAN POTASSIUM 25 MG/1
50 TABLET ORAL DAILY
Qty: 180 TABLET | Refills: 1 | Status: CANCELLED | OUTPATIENT
Start: 2021-11-01 | End: 2022-04-30

## 2021-11-01 RX ORDER — METFORMIN HYDROCHLORIDE 750 MG/1
750 TABLET, EXTENDED RELEASE ORAL 2 TIMES DAILY
Qty: 180 TABLET | Refills: 1 | Status: SHIPPED | OUTPATIENT
Start: 2021-11-01 | End: 2022-02-01 | Stop reason: SDUPTHER

## 2022-01-12 ENCOUNTER — TELEPHONE (OUTPATIENT)
Dept: FAMILY MEDICINE | Facility: CLINIC | Age: 62
End: 2022-01-12
Payer: COMMERCIAL

## 2022-01-27 LAB
ALBUMIN SERPL-MCNC: 4.3 G/DL (ref 3.6–5.1)
ALBUMIN/CREAT UR: 72 MCG/MG CREAT
ALBUMIN/GLOB SERPL: 1.5 (CALC) (ref 1–2.5)
ALP SERPL-CCNC: 96 U/L (ref 37–153)
ALT SERPL-CCNC: 27 U/L (ref 6–29)
APPEARANCE UR: CLEAR
AST SERPL-CCNC: 20 U/L (ref 10–35)
BACTERIA #/AREA URNS HPF: ABNORMAL /HPF
BACTERIA UR CULT: ABNORMAL
BASOPHILS # BLD AUTO: 38 CELLS/UL (ref 0–200)
BASOPHILS NFR BLD AUTO: 0.4 %
BILIRUB SERPL-MCNC: 0.4 MG/DL (ref 0.2–1.2)
BILIRUB UR QL STRIP: NEGATIVE
BUN SERPL-MCNC: 16 MG/DL (ref 7–25)
BUN/CREAT SERPL: ABNORMAL (CALC) (ref 6–22)
CALCIUM SERPL-MCNC: 10 MG/DL (ref 8.6–10.4)
CHLORIDE SERPL-SCNC: 94 MMOL/L (ref 98–110)
CHOLEST SERPL-MCNC: 209 MG/DL
CHOLEST/HDLC SERPL: 3.5 (CALC)
CO2 SERPL-SCNC: 29 MMOL/L (ref 20–32)
COLOR UR: ABNORMAL
CREAT SERPL-MCNC: 0.61 MG/DL (ref 0.5–0.99)
CREAT UR-MCNC: 65 MG/DL (ref 20–275)
EOSINOPHIL # BLD AUTO: 314 CELLS/UL (ref 15–500)
EOSINOPHIL NFR BLD AUTO: 3.3 %
ERYTHROCYTE [DISTWIDTH] IN BLOOD BY AUTOMATED COUNT: 12.9 % (ref 11–15)
GLOBULIN SER CALC-MCNC: 2.9 G/DL (CALC) (ref 1.9–3.7)
GLUCOSE SERPL-MCNC: 171 MG/DL (ref 65–99)
GLUCOSE UR QL STRIP: NEGATIVE
HCT VFR BLD AUTO: 45.1 % (ref 35–45)
HDLC SERPL-MCNC: 60 MG/DL
HGB BLD-MCNC: 15.1 G/DL (ref 11.7–15.5)
HGB UR QL STRIP: NEGATIVE
HYALINE CASTS #/AREA URNS LPF: ABNORMAL /LPF
KETONES UR QL STRIP: NEGATIVE
LDLC SERPL CALC-MCNC: 117 MG/DL (CALC)
LEUKOCYTE ESTERASE UR QL STRIP: NEGATIVE
LYMPHOCYTES # BLD AUTO: 2261 CELLS/UL (ref 850–3900)
LYMPHOCYTES NFR BLD AUTO: 23.8 %
MCH RBC QN AUTO: 29.2 PG (ref 27–33)
MCHC RBC AUTO-ENTMCNC: 33.5 G/DL (ref 32–36)
MCV RBC AUTO: 87.2 FL (ref 80–100)
MICROALBUMIN UR-MCNC: 4.7 MG/DL
MONOCYTES # BLD AUTO: 684 CELLS/UL (ref 200–950)
MONOCYTES NFR BLD AUTO: 7.2 %
NEUTROPHILS # BLD AUTO: 6204 CELLS/UL (ref 1500–7800)
NEUTROPHILS NFR BLD AUTO: 65.3 %
NITRITE UR QL STRIP: NEGATIVE
NONHDLC SERPL-MCNC: 149 MG/DL (CALC)
PH UR STRIP: 8 [PH] (ref 5–8)
PLATELET # BLD AUTO: 376 THOUSAND/UL (ref 140–400)
PMV BLD REES-ECKER: 9.9 FL (ref 7.5–12.5)
POTASSIUM SERPL-SCNC: 4.4 MMOL/L (ref 3.5–5.3)
PROT SERPL-MCNC: 7.2 G/DL (ref 6.1–8.1)
PROT UR QL STRIP: ABNORMAL
RBC # BLD AUTO: 5.17 MILLION/UL (ref 3.8–5.1)
RBC #/AREA URNS HPF: ABNORMAL /HPF
SODIUM SERPL-SCNC: 134 MMOL/L (ref 135–146)
SP GR UR STRIP: 1.02 (ref 1–1.03)
SQUAMOUS #/AREA URNS HPF: ABNORMAL /HPF
TRIGL SERPL-MCNC: 204 MG/DL
TSH SERPL-ACNC: 2.47 MIU/L (ref 0.4–4.5)
WBC # BLD AUTO: 9.5 THOUSAND/UL (ref 3.8–10.8)
WBC #/AREA URNS HPF: ABNORMAL /HPF

## 2022-02-01 ENCOUNTER — OFFICE VISIT (OUTPATIENT)
Dept: FAMILY MEDICINE | Facility: CLINIC | Age: 62
End: 2022-02-01
Payer: COMMERCIAL

## 2022-02-01 VITALS
SYSTOLIC BLOOD PRESSURE: 132 MMHG | WEIGHT: 245 LBS | HEART RATE: 68 BPM | BODY MASS INDEX: 43.41 KG/M2 | HEIGHT: 63 IN | DIASTOLIC BLOOD PRESSURE: 78 MMHG | OXYGEN SATURATION: 99 %

## 2022-02-01 DIAGNOSIS — Z63.4 GRIEF AT LOSS OF CHILD: ICD-10-CM

## 2022-02-01 DIAGNOSIS — F43.21 GRIEF AT LOSS OF CHILD: ICD-10-CM

## 2022-02-01 DIAGNOSIS — I10 ESSENTIAL HYPERTENSION: ICD-10-CM

## 2022-02-01 DIAGNOSIS — M62.838 MUSCLE SPASMS OF NECK: ICD-10-CM

## 2022-02-01 DIAGNOSIS — E11.9 TYPE 2 DIABETES MELLITUS WITHOUT COMPLICATION, WITHOUT LONG-TERM CURRENT USE OF INSULIN: Primary | ICD-10-CM

## 2022-02-01 DIAGNOSIS — E11.9 TYPE 2 DIABETES MELLITUS WITHOUT COMPLICATION, WITHOUT LONG-TERM CURRENT USE OF INSULIN: ICD-10-CM

## 2022-02-01 LAB — HBA1C MFR BLD: 7.8 %

## 2022-02-01 PROCEDURE — 3008F PR BODY MASS INDEX (BMI) DOCUMENTED: ICD-10-PCS | Mod: S$GLB,,, | Performed by: PHYSICIAN ASSISTANT

## 2022-02-01 PROCEDURE — 99214 OFFICE O/P EST MOD 30 MIN: CPT | Mod: S$GLB,,, | Performed by: PHYSICIAN ASSISTANT

## 2022-02-01 PROCEDURE — 3066F PR DOCUMENTATION OF TREATMENT FOR NEPHROPATHY: ICD-10-PCS | Mod: S$GLB,,, | Performed by: PHYSICIAN ASSISTANT

## 2022-02-01 PROCEDURE — 3075F SYST BP GE 130 - 139MM HG: CPT | Mod: S$GLB,,, | Performed by: PHYSICIAN ASSISTANT

## 2022-02-01 PROCEDURE — 3078F PR MOST RECENT DIASTOLIC BLOOD PRESSURE < 80 MM HG: ICD-10-PCS | Mod: S$GLB,,, | Performed by: PHYSICIAN ASSISTANT

## 2022-02-01 PROCEDURE — 3051F HG A1C>EQUAL 7.0%<8.0%: CPT | Mod: S$GLB,,, | Performed by: PHYSICIAN ASSISTANT

## 2022-02-01 PROCEDURE — 3078F DIAST BP <80 MM HG: CPT | Mod: S$GLB,,, | Performed by: PHYSICIAN ASSISTANT

## 2022-02-01 PROCEDURE — 3051F PR MOST RECENT HEMOGLOBIN A1C LEVEL 7.0 - < 8.0%: ICD-10-PCS | Mod: S$GLB,,, | Performed by: PHYSICIAN ASSISTANT

## 2022-02-01 PROCEDURE — 3075F PR MOST RECENT SYSTOLIC BLOOD PRESS GE 130-139MM HG: ICD-10-PCS | Mod: S$GLB,,, | Performed by: PHYSICIAN ASSISTANT

## 2022-02-01 PROCEDURE — 3060F PR POS MICROALBUMINURIA RESULT DOCUMENTED/REVIEW: ICD-10-PCS | Mod: S$GLB,,, | Performed by: PHYSICIAN ASSISTANT

## 2022-02-01 PROCEDURE — 99214 PR OFFICE/OUTPT VISIT, EST, LEVL IV, 30-39 MIN: ICD-10-PCS | Mod: S$GLB,,, | Performed by: PHYSICIAN ASSISTANT

## 2022-02-01 PROCEDURE — 83036 POCT HEMOGLOBIN A1C: ICD-10-PCS | Mod: QW,,, | Performed by: PHYSICIAN ASSISTANT

## 2022-02-01 PROCEDURE — 3066F NEPHROPATHY DOC TX: CPT | Mod: S$GLB,,, | Performed by: PHYSICIAN ASSISTANT

## 2022-02-01 PROCEDURE — 3008F BODY MASS INDEX DOCD: CPT | Mod: S$GLB,,, | Performed by: PHYSICIAN ASSISTANT

## 2022-02-01 PROCEDURE — 3060F POS MICROALBUMINURIA REV: CPT | Mod: S$GLB,,, | Performed by: PHYSICIAN ASSISTANT

## 2022-02-01 PROCEDURE — 83036 HEMOGLOBIN GLYCOSYLATED A1C: CPT | Mod: QW,,, | Performed by: PHYSICIAN ASSISTANT

## 2022-02-01 RX ORDER — TRAZODONE HYDROCHLORIDE 50 MG/1
50 TABLET ORAL NIGHTLY
Qty: 30 TABLET | Refills: 5 | Status: SHIPPED | OUTPATIENT
Start: 2022-02-01 | End: 2022-05-02 | Stop reason: SDUPTHER

## 2022-02-01 RX ORDER — CYCLOBENZAPRINE HCL 5 MG
5 TABLET ORAL 3 TIMES DAILY PRN
Qty: 30 TABLET | Refills: 0 | Status: SHIPPED | OUTPATIENT
Start: 2022-02-01 | End: 2023-12-18 | Stop reason: SDUPTHER

## 2022-02-01 RX ORDER — POTASSIUM CHLORIDE 750 MG/1
CAPSULE, EXTENDED RELEASE ORAL
Qty: 270 CAPSULE | Refills: 1 | Status: SHIPPED | OUTPATIENT
Start: 2022-02-01 | End: 2022-05-02 | Stop reason: SDUPTHER

## 2022-02-01 RX ORDER — METFORMIN HYDROCHLORIDE 750 MG/1
750 TABLET, EXTENDED RELEASE ORAL 2 TIMES DAILY
Qty: 180 TABLET | Refills: 1 | Status: SHIPPED | OUTPATIENT
Start: 2022-02-01 | End: 2022-05-02 | Stop reason: SDUPTHER

## 2022-02-01 RX ORDER — ATENOLOL AND CHLORTHALIDONE TABLET 50; 25 MG/1; MG/1
1 TABLET ORAL DAILY
Qty: 90 TABLET | Refills: 1 | Status: SHIPPED | OUTPATIENT
Start: 2022-02-01 | End: 2022-05-02 | Stop reason: SDUPTHER

## 2022-02-01 RX ORDER — CYCLOBENZAPRINE HCL 5 MG
5 TABLET ORAL 3 TIMES DAILY PRN
COMMUNITY
End: 2022-02-01 | Stop reason: SDUPTHER

## 2022-02-01 RX ORDER — SEMAGLUTIDE 1.34 MG/ML
0.5 INJECTION, SOLUTION SUBCUTANEOUS
Qty: 1 PEN | Refills: 5 | Status: CANCELLED | OUTPATIENT
Start: 2022-02-01 | End: 2022-07-31

## 2022-02-01 RX ORDER — LOSARTAN POTASSIUM 50 MG/1
50 TABLET ORAL DAILY
Qty: 90 TABLET | Refills: 1 | Status: SHIPPED | OUTPATIENT
Start: 2022-02-01 | End: 2022-05-02 | Stop reason: SDUPTHER

## 2022-02-01 SDOH — SOCIAL DETERMINANTS OF HEALTH (SDOH): DISSAPEARANCE AND DEATH OF FAMILY MEMBER: Z63.4

## 2022-02-01 NOTE — PROGRESS NOTES
SUBJECTIVE:    Patient ID: Angelina Jennings is a 61 y.o. female.    Chief Complaint: Follow-up (3 mo f/u for diabetes//no med bottles//colonoscopy scheduled for march 25 2022//A1C ordered//tc)    Patient is a 60 yo female who is here for a diabetic follow up. A1c 7.8%% today, up from 7.5%. A1c has been trending up for the past year. Reports minimal tingling in feet at night, especially if sitting for too long. She is unsure if this is related to lack of movement or neuropathy. Labs reviewed with patient. Discussed healthy diet and exercise at length to help with A1c and triglycerides.     Muscle spasms in neck is still persistent but manageable. Uses cyclobenzaprine 5mg about once a week which helps significantly.      Luke passed away a year ago d/t covid. She is still understandably grieving this loss. She feels she has good social support at home but has not been able to sleep well.       Office Visit on 11/01/2021   Component Date Value Ref Range Status    Hemoglobin A1C 11/01/2021 7.5  % Final    WBC 01/26/2022 9.5  3.8 - 10.8 Thousand/uL Final    RBC 01/26/2022 5.17* 3.80 - 5.10 Million/uL Final    Hemoglobin 01/26/2022 15.1  11.7 - 15.5 g/dL Final    Hematocrit 01/26/2022 45.1* 35.0 - 45.0 % Final    MCV 01/26/2022 87.2  80.0 - 100.0 fL Final    MCH 01/26/2022 29.2  27.0 - 33.0 pg Final    MCHC 01/26/2022 33.5  32.0 - 36.0 g/dL Final    RDW 01/26/2022 12.9  11.0 - 15.0 % Final    Platelets 01/26/2022 376  140 - 400 Thousand/uL Final    MPV 01/26/2022 9.9  7.5 - 12.5 fL Final    Neutrophils, Abs 01/26/2022 6,204  1,500 - 7,800 cells/uL Final    Lymph # 01/26/2022 2,261  850 - 3,900 cells/uL Final    Mono # 01/26/2022 684  200 - 950 cells/uL Final    Eos # 01/26/2022 314  15 - 500 cells/uL Final    Baso # 01/26/2022 38  0 - 200 cells/uL Final    Neutrophils Relative 01/26/2022 65.3  % Final    Lymph % 01/26/2022 23.8  % Final    Mono % 01/26/2022 7.2  % Final    Eosinophil % 01/26/2022 3.3   % Final    Basophil % 01/26/2022 0.4  % Final    Glucose 01/26/2022 171* 65 - 99 mg/dL Final    BUN 01/26/2022 16  7 - 25 mg/dL Final    Creatinine 01/26/2022 0.61  0.50 - 0.99 mg/dL Final    eGFR if non African American 01/26/2022 98  > OR = 60 mL/min/1.73m2 Final    eGFR if  01/26/2022 113  > OR = 60 mL/min/1.73m2 Final    BUN/Creatinine Ratio 01/26/2022 NOT APPLICABLE  6 - 22 (calc) Final    Sodium 01/26/2022 134* 135 - 146 mmol/L Final    Potassium 01/26/2022 4.4  3.5 - 5.3 mmol/L Final    Chloride 01/26/2022 94* 98 - 110 mmol/L Final    CO2 01/26/2022 29  20 - 32 mmol/L Final    Calcium 01/26/2022 10.0  8.6 - 10.4 mg/dL Final    Total Protein 01/26/2022 7.2  6.1 - 8.1 g/dL Final    Albumin 01/26/2022 4.3  3.6 - 5.1 g/dL Final    Globulin, Total 01/26/2022 2.9  1.9 - 3.7 g/dL (calc) Final    Albumin/Globulin Ratio 01/26/2022 1.5  1.0 - 2.5 (calc) Final    Total Bilirubin 01/26/2022 0.4  0.2 - 1.2 mg/dL Final    Alkaline Phosphatase 01/26/2022 96  37 - 153 U/L Final    AST 01/26/2022 20  10 - 35 U/L Final    ALT 01/26/2022 27  6 - 29 U/L Final    Cholesterol 01/26/2022 209* <200 mg/dL Final    HDL 01/26/2022 60  > OR = 50 mg/dL Final    Triglycerides 01/26/2022 204* <150 mg/dL Final    LDL Cholesterol 01/26/2022 117* mg/dL (calc) Final    HDL/Cholesterol Ratio 01/26/2022 3.5  <5.0 (calc) Final    Non HDL Chol. (LDL+VLDL) 01/26/2022 149* <130 mg/dL (calc) Final    Creatinine, Urine 01/26/2022 65  20 - 275 mg/dL Final    Microalb, Ur 01/26/2022 4.7  See Note: mg/dL Final    Microalb/Creat Ratio 01/26/2022 72* <30 mcg/mg creat Final    TSH w/reflex to FT4 01/26/2022 2.47  0.40 - 4.50 mIU/L Final    Color, UA 01/26/2022 DARK YELLOW  YELLOW Final    Appearance, UA 01/26/2022 CLEAR  CLEAR Final    Specific Gravity, UA 01/26/2022 1.018  1.001 - 1.035 Final    pH, UA 01/26/2022 8.0  5.0 - 8.0 Final    Glucose, UA 01/26/2022 NEGATIVE  NEGATIVE Final    Bilirubin, UA  01/26/2022 NEGATIVE  NEGATIVE Final    Ketones, UA 01/26/2022 NEGATIVE  NEGATIVE Final    Occult Blood UA 01/26/2022 NEGATIVE  NEGATIVE Final    Protein, UA 01/26/2022 TRACE* NEGATIVE Final    Nitrite, UA 01/26/2022 NEGATIVE  NEGATIVE Final    Leukocytes, UA 01/26/2022 NEGATIVE  NEGATIVE Final    WBC Casts, UA 01/26/2022 0-5  < OR = 5 /HPF Final    RBC Casts, UA 01/26/2022 NONE SEEN  < OR = 2 /HPF Final    Squam Epithel, UA 01/26/2022 0-5  < OR = 5 /HPF Final    Bacteria, UA 01/26/2022 NONE SEEN  NONE SEEN /HPF Final    Hyaline Casts, UA 01/26/2022 NONE SEEN  NONE SEEN /LPF Final    Reflexive Urine Culture 01/26/2022    Final   Orders Only on 10/19/2021   Component Date Value Ref Range Status    Left Eye DM Retinopathy 10/13/2021 Negative   Final    Right Eye DM Retinopathy 10/13/2021 Negative   Final       Past Medical History:   Diagnosis Date    Diabetes mellitus, type 2     Hypertension      Past Surgical History:   Procedure Laterality Date    TONSILLECTOMY       Family History   Problem Relation Age of Onset    Cancer Maternal Aunt     Cancer Paternal Grandmother     Diabetes Maternal Grandfather     Early death Mother     Heart disease Father     Heart disease Paternal Grandfather        Marital Status:   Alcohol History:  reports current alcohol use.  Tobacco History:  reports that she quit smoking about 8 years ago. Her smoking use included cigarettes. She has a 52.50 pack-year smoking history. She has never used smokeless tobacco.  Drug History:  reports no history of drug use.    Review of patient's allergies indicates:  No Known Allergies    Current Outpatient Medications:     APPLE CIDER VINEGAR ORAL, Take by mouth., Disp: , Rfl:     ascorbic acid, vitamin C, (VITAMIN C) 500 MG tablet, Take 500 mg by mouth once daily., Disp: , Rfl:     aspirin 81 MG Chew, Take 81 mg by mouth once daily. , Disp: , Rfl:     atenoloL-chlorthalidone (TENORETIC) 50-25 mg Tab, Take 1 tablet by  mouth once daily., Disp: 90 tablet, Rfl: 1    b complex vitamins tablet, Take 1 tablet by mouth once daily., Disp: , Rfl:     blood sugar diagnostic (ONETOUCH ULTRA BLUE TEST STRIP) Strp, 30 strips by In Vitro route once daily. as directed, Disp: 30 strip, Rfl: 11    cholecalciferol, vitamin D3, (VITAMIN D3) 25 mcg (1,000 unit) capsule, Take 1,000 Units by mouth once daily., Disp: , Rfl:     cyanocobalamin, vitamin B-12, 2,500 mcg Tab, 2,500 mcg once daily. , Disp: , Rfl:     cyclobenzaprine (FLEXERIL) 5 MG tablet, Take 1 tablet (5 mg total) by mouth 3 (three) times daily as needed for Muscle spasms., Disp: 30 tablet, Rfl: 0    diclofenac sodium (VOLTAREN) 1 % Gel, Apply 2 g topically 4 (four) times daily., Disp: 100 g, Rfl: 2    losartan (COZAAR) 50 MG tablet, Take 1 tablet (50 mg total) by mouth once daily., Disp: 90 tablet, Rfl: 1    MAGNESIUM OXIDE ORAL, Take by mouth., Disp: , Rfl:     metFORMIN (GLUCOPHAGE-XR) 750 MG ER 24hr tablet, Take 1 tablet (750 mg total) by mouth 2 (two) times daily., Disp: 180 tablet, Rfl: 1    potassium chloride (MICRO-K) 10 MEQ CpSR, Take 3 capsules by mouth once daily for 180 doses, Disp: 270 capsule, Rfl: 1    semaglutide (OZEMPIC) 1 mg/dose (4 mg/3 mL), Inject 1 mg into the skin every 7 days., Disp: 1 pen, Rfl: 5    traZODone (DESYREL) 50 MG tablet, Take 1 tablet (50 mg total) by mouth every evening., Disp: 30 tablet, Rfl: 5    zinc gluconate 50 mg tablet, Take 50 mg by mouth once daily., Disp: , Rfl:     Review of Systems   Constitutional: Negative for activity change, fatigue, fever and unexpected weight change.   HENT: Negative for congestion.    Respiratory: Negative for apnea, cough, chest tightness and shortness of breath.    Cardiovascular: Negative for chest pain and palpitations.   Gastrointestinal: Negative for abdominal distention and abdominal pain.   Genitourinary: Negative for difficulty urinating and dyspareunia.   Musculoskeletal: Negative for  "arthralgias and back pain.   Neurological: Negative for dizziness and weakness.   Psychiatric/Behavioral: Positive for sleep disturbance.          Objective:      Vitals:    02/01/22 0750   BP: 132/78   Pulse: 68   SpO2: 99%   Weight: 111.1 kg (245 lb)   Height: 5' 2.5" (1.588 m)     Physical Exam  Constitutional:       General: She is not in acute distress.  HENT:      Head: Normocephalic and atraumatic.   Eyes:      Pupils: Pupils are equal, round, and reactive to light.   Cardiovascular:      Rate and Rhythm: Normal rate and regular rhythm.      Heart sounds: Normal heart sounds.   Pulmonary:      Effort: Pulmonary effort is normal.      Breath sounds: Normal breath sounds.   Abdominal:      General: Bowel sounds are normal. There is no distension.      Palpations: Abdomen is soft.      Tenderness: There is no abdominal tenderness.   Musculoskeletal:         General: Normal range of motion.      Cervical back: Normal range of motion and neck supple.   Skin:     General: Skin is warm and dry.      Findings: No erythema or rash.   Neurological:      Mental Status: She is alert and oriented to person, place, and time.      Cranial Nerves: No cranial nerve deficit.   Psychiatric:         Behavior: Behavior normal.         Thought Content: Thought content normal.         Judgment: Judgment normal.           Assessment:       1. Type 2 diabetes mellitus without complication, without long-term current use of insulin    2. Type 2 diabetes mellitus without complication, without long-term current use of insulin    3. Muscle spasms of neck    4. Essential hypertension    5. Grief at loss of child         Plan:       Type 2 diabetes mellitus without complication, without long-term current use of insulin  Comments:  A1c 7.8% today, up from 7.5%. Will increase Ozempic 1mg. Discussed healthy diet at [facility]. F/u in 3 months w/ repeat A1c.  Orders:  -     Hemoglobin A1C, POCT  -     metFORMIN (GLUCOPHAGE-XR) 750 MG ER 24hr " tablet; Take 1 tablet (750 mg total) by mouth 2 (two) times daily.  Dispense: 180 tablet; Refill: 1  -     semaglutide (OZEMPIC) 1 mg/dose (4 mg/3 mL); Inject 1 mg into the skin every 7 days.  Dispense: 1 pen; Refill: 5    Type 2 diabetes mellitus without complication, without long-term current use of insulin  -     Hemoglobin A1C, POCT  -     metFORMIN (GLUCOPHAGE-XR) 750 MG ER 24hr tablet; Take 1 tablet (750 mg total) by mouth 2 (two) times daily.  Dispense: 180 tablet; Refill: 1  -     semaglutide (OZEMPIC) 1 mg/dose (4 mg/3 mL); Inject 1 mg into the skin every 7 days.  Dispense: 1 pen; Refill: 5    Muscle spasms of neck  Comments:  Well managed with cyclobenzaprine 5mg prn. Continue as is.  Orders:  -     cyclobenzaprine (FLEXERIL) 5 MG tablet; Take 1 tablet (5 mg total) by mouth 3 (three) times daily as needed for Muscle spasms.  Dispense: 30 tablet; Refill: 0    Essential hypertension  Comments:  BP is stable and well controlled at 132/78 today. Continue as is. Refills sent today.  Orders:  -     atenoloL-chlorthalidone (TENORETIC) 50-25 mg Tab; Take 1 tablet by mouth once daily.  Dispense: 90 tablet; Refill: 1  -     losartan (COZAAR) 50 MG tablet; Take 1 tablet (50 mg total) by mouth once daily.  Dispense: 90 tablet; Refill: 1    Grief at loss of child  Comments:  Sleep disturbances d/t grief. Discussed healthy sleep habits. Suggest Melatonin OTC titrated up to 10mg. add trazodone low dose.  Orders:  -     traZODone (DESYREL) 50 MG tablet; Take 1 tablet (50 mg total) by mouth every evening.  Dispense: 30 tablet; Refill: 5    Other orders  -     potassium chloride (MICRO-K) 10 MEQ CpSR; Take 3 capsules by mouth once daily for 180 doses  Dispense: 270 capsule; Refill: 1      Follow up in about 3 months (around 5/1/2022) for Diabetic Check-Up.        2/1/2022 Mendoza Hardy PA-C

## 2022-02-01 NOTE — PATIENT INSTRUCTIONS
"Patient Education       Diabetes and Diet   The Basics   Written by the doctors and editors at [facility]   Why is diet important in diabetes? -- Diet is important because it is part of diabetes treatment. Many people need to change what they eat and how much they eat to help treat their diabetes. It is important for people to treat their diabetes so that they:  · Keep their blood sugar at or near a normal level  · Prevent long-term problems, such as heart or kidney problems, that can happen in people with diabetes  Changing your diet can also help treat obesity, high blood pressure, and high cholesterol. These conditions can affect people with diabetes and can lead to future problems, such as heart attacks or strokes.  Who will work with me to change my diet? -- Your doctor or nurse will work with you to make a food plan to change your diet. They might also recommend that you work with a "dietitian." A dietitian is an expert on food and eating.  Do I need to eat at the same times every day? -- When and how often you should eat depends, in part, on the diabetes medicines you take. For example:  · People who take about the same amount of insulin at the same time each day (called a "fixed regimen") should eat meals at the same times. This is also true for people who take pills that increase insulin levels, such as sulfonylureas. Eating meals at the same time every day helps prevent low blood sugar.  · People who adjust the dose and timing of their insulin each day (called a "flexible regimen") do not always have to eat meals at the same time. That's because they can time their insulin dose for before they plan to eat, and also adjust the dose for how much they plan to eat.  · People who take medicines that don't usually cause low blood sugar, such as metformin, don't have to eat meals at the same time every day.  What do I need to think about when planning what to eat? -- Our bodies break down the food we eat into small " "pieces called carbohydrates, proteins, and fats.  When planning what to eat, people with diabetes need to think about:  · Carbohydrates (or "carbs") - Carbohydrates, which are sugars that our bodies use for energy, can raise a person's blood sugar level. Your doctor, nurse, or dietitian will tell you how many carbohydrates you should eat at each meal or snack. Foods that have carbohydrates include:  ? Bread, pasta, and rice  ? Vegetables and fruits  ? Dairy foods  ? Foods and drinks with added sugar  It is best to get your carbohydrates from fruits, vegetables, whole grains, and low-fat milk. It is best to avoid drinks with added sugar, like soda, juices, and sports drinks.   · Protein - Your doctor, nurse, or dietitian will tell you how much protein you should eat each day. It is best to eat lean meats, fish, eggs, beans, peas, soy products, nuts, and seeds.  · Fats - The type of fat you eat is more important than the amount of fat. "Saturated" and "trans" fats can increase your risk for heart problems, like a heart attack.  ? Foods that have saturated fats include meat, butter, cheese, and ice cream.  ? Foods that have trans fats include processed food with "partially hydrogenated oils" on the ingredient list. This may include fried foods, store bought cookies, muffins, pies, and cakes.  "Monounsaturated" and "polyunsaturated" fats are better for you. Foods with these types of fat include fish, avocado, olive oil, and nuts.  · Calories - People need to eat a certain amount of calories each day to keep their weight the same. People who are overweight and want to lose weight need to eat fewer calories each day.  · Fiber - Eating foods with a lot of fiber can help control a person's blood sugar level. Foods that have a lot of fiber include apples, green beans, peas, beans, lentils, nuts, oatmeal, and whole grains.  · Salt - People who have high blood pressure should not eat foods that contain a lot of salt (also " called sodium). People with high blood pressure should also eat healthy foods, such as fruits, vegetables, and low-fat dairy foods.  · Alcohol - Having more than 1 drink (for women) or 2 drinks (for men) a day can raise blood sugar levels. Also, drinks that have fruit juice or soda in them can raise blood sugar levels.  What can I do if I need to lose weight? -- If you need to lose weight, you can:  · Exercise - Try to get at least 30 minutes of physical activity a day, most days of the week. Even gentle exercise, like walking, is good for your health. Some people with diabetes need to change their medicine dose before they exercise. They might also need to check their blood sugar levels before and after exercising.  · Eat fewer calories - Your doctor, nurse, or dietitian can tell you how many calories you should eat each day in order to lose weight.  If you are worried about your weight, size, or shape, talk with your doctor, nurse, or dietitian. They can help you make changes to improve your health.  Can I eat the same foods as my family? -- Yes. You do not need to eat special foods if you have diabetes. You and your family can eat the same foods. Changing your diet is mostly about eating healthy foods and not eating too much.  What are the other parts of diabetes treatment? -- Besides changing your diet, the other parts of diabetes treatment are:  · Exercise  · Medicines  Some people with diabetes need to learn how to match their diet and exercise with their medicine dose. For example, people who use insulin might need to choose the dose of insulin they give themselves. To choose their dose, they need to think about:  · What they plan to eat at the next meal  · How much exercise they plan to do  · What their blood sugar level is  If the diet and exercise do not match the medicine dose, a person's blood sugar level can get too low or too high. Blood sugar levels that are too low or too high can cause  problems.  All topics are updated as new evidence becomes available and our peer review process is complete.  This topic retrieved from [employer] on: Sep 21, 2021.  Topic 95673 Version 7.0  Release: 29.4.2 - C29.263  © 2021 [employer], [employer] and/or its affiliates. All rights reserved.  Consumer Information Use and Disclaimer   This information is not specific medical advice and does not replace information you receive from your health care provider. This is only a brief summary of general information. It does NOT include all information about conditions, illnesses, injuries, tests, procedures, treatments, therapies, discharge instructions or life-style choices that may apply to you. You must talk with your health care provider for complete information about your health and treatment options. This information should not be used to decide whether or not to accept your health care provider's advice, instructions or recommendations. Only your health care provider has the knowledge and training to provide advice that is right for you. The use of this information is governed by the [employer] End User License Agreement, available at https://www.[employer]/en/solutions/PSafe/about/kayla.The use of [employer] content is governed by the [employer] Terms of Use. ©2021 [employer] Inc. All rights reserved.  Copyright   © 2021 [employer], Inc. and/or its affiliates. All rights reserved.  Patient Education       Heart Healthy Diet   General   With a heart healthy food plan, you will learn to make better food choices. This diet may help you lower your blood cholesterol level, manage your blood pressure, and lower your risk for heart problems. Smaller portions may also be helpful.  Sodium is a type of mineral found in many foods. It helps keep the balance of fluids in your body. Too much sodium can raise your blood pressure. It can also make you take on extra water. This is called edema. Pay careful attention to how much salt or sodium  is in your food. You may need to avoid salt or eat foods with less sodium.  Cholesterol is a fat-like, waxy substance in your blood. It is normal to have some cholesterol in your blood because your body makes it. You also get extra cholesterol from all animal products. These are foods like meats, eggs, and dairy products. Too much cholesterol in your blood can block or damage your blood vessels. This can lead to a heart attack or stroke.  Fats in your food have calories which give energy. Not all fats are bad. Some fats are healthy, like the fat found in fish, nuts, and olive oil. These are called unsaturated fats. They help manage body functions and lower cholesterol levels. Learn about the best fats to use in your diet and where to use them. Eating too much fat may make you more likely to weigh more than is healthy. This raises your risk of many heart problems.  Fiber is found in plants. Meat and dairy products do not have fiber in them. Fiber can help you lower your unhealthy cholesterol level. You may need more water as you eat more fiber so you do not get hard stools.  What lifestyle changes are needed?   Eat a healthy diet and workout often. Try to use as many calories as you take in each day.  What changes to diet are needed?   · Eat oily fish at least 2 times a week. These are fish like tuna, salmon, and mackerel.  · Limit sodium to no more than 2,300 mg of sodium per day. This is about 1 teaspoon (5 grams) of table salt. Use little or no salt when making food. Try other spices or seasoning instead.  · Limit how much cholesterol you eat to less than 300 mg per day. You can do this by having lean meats. Also eat lots of fruits, vegetables, and fat-free and low-fat dairy products.  · Limit how much trans fats you eat. Trans fats are found in many processed foods like stick margarine, shortening, and some fried foods. Also, lower how much hydrogenated fats you eat. They are used to make pastries, biscuits,  cookies, crackers, chips, and many snack foods.  · Have no more than 1 drink per day of beer, wine, and mixed drinks (alcohol).  Who should use this diet?   A heart healthy diet is good for everyone.  What foods are good to eat?   · Grains: Try to eat 6 to 8 servings of whole grain, high fiber foods each day. These are whole grain bread, cereals, brown rice, or pasta.  · Fruits and vegetables: Eat 4 to 5 servings each day. Try to pick many kinds and colors. Try to eat more that are fresh or frozen. Look for low sodium or salt-free if you choose canned. Rinse canned items before cooking or eating. Dried peas, beans, and lentils are also good.  · Dairy: Choose low fat (1%) or fat-free milk. Eat nonfat or low-fat products.  · Protein: Try to eat more low fat or lean meats like chicken and turkey. Eat less red meat and eat more fish, eggs, egg whites, and beans instead.  · Fats: Use good fats found in fish, nuts, and avocados. Try using olive oil, canola oil, and low-sodium and low-fat salad dressing and mayonnaise. Use corn, safflower, sunflower, and soybean oils.  · Condiments: Use low-sodium or salt-free broths, soups, soy sauce, and condiments. Pepper, herbs, spices, vinegar, lemon or lime juices are great for seasoning. Sugar, cocoa powder, honey, syrup, and jams may be eaten in small amounts.  · Sweets: Low-fat, trans fat-free cookies, cakes, and pies; ksenia crackers; animal crackers; low-fat fig bars; and ruddy snaps.     What foods should be limited or avoided?   · Grains: Salted breads, rolls, crackers, quick breads, self-rising flours, biscuit mixes, regular bread crumbs, instant hot cereals, commercially-prepared rice, pasta, stuffing mixes  · Fruits and vegetables: Commercially-prepared potatoes and vegetable mixes, regular canned vegetables and juices, vegetables frozen with sauce or pickled vegetables, processed fruits with added sugar or salt  · Dairy: Whole milk, malted milk, chocolate milk,  buttermilk  · Protein: Smoked, cured, salted, or canned meat, fish, or poultry such as oliva and sausages  · Fats: Cut back on solid fats like butter, lard, and margarine.  · Condiments and snacks: Salted and canned peas, beans, and olives; salted snack foods; fried foods; soda, juices, or other sweetened drinks; commercially-softened water. Miso, salsa, ketchup, barbecue sauce, Worcestershire sauce, soy sauce, and teriyaki sauce are also high in salt.  · Sweets: High-fat baked goods such as muffins, donuts, pastries, commercial baked goods  Helpful tips   · When you go to a grocery store, have a list or a meal plan. Do not shop when you are hungry to avoid cravings for foods.  · You need to know about the sodium and fat content of the food you eat. Read food labels with care. They will show you how much of each is in a serving. This amount is given as a percentage of the total amount you need each day. Reading the labels will help you make healthy food choices.     · Avoid fast foods.  · Watch your portions when eating out. Split an order or bring home half for another meal.     · Talk to a dietitian for help.  Where can I learn more?   American Academy of [employer] Physicians  https://familydoctor.org/diet-and-exercise-for-a-healthy-heart/   American Heart Association  https://www.heart.org/en/healthy-living/healthy-eating/eat-smart/nutrition-basics/aha-diet-and-lifestyle-recommendations   NHS  https://www.nhs.uk/live-well/eat-well/   Last Reviewed Date   2020-03-27  Consumer Information Use and Disclaimer   This information is not specific medical advice and does not replace information you receive from your health care provider. This is only a brief summary of general information. It does NOT include all information about conditions, illnesses, injuries, tests, procedures, treatments, therapies, discharge instructions or life-style choices that may apply to you. You must talk with your health care provider for  complete information about your health and treatment options. This information should not be used to decide whether or not to accept your health care providers advice, instructions or recommendations. Only your health care provider has the knowledge and training to provide advice that is right for you.  Copyright   Copyright © 2021 [employer] and its affiliates and/or licensors. All rights reserved.

## 2022-05-02 ENCOUNTER — OFFICE VISIT (OUTPATIENT)
Dept: FAMILY MEDICINE | Facility: CLINIC | Age: 62
End: 2022-05-02
Payer: COMMERCIAL

## 2022-05-02 VITALS — HEIGHT: 63 IN | WEIGHT: 243 LBS | BODY MASS INDEX: 43.05 KG/M2 | HEART RATE: 66 BPM

## 2022-05-02 DIAGNOSIS — E11.42 DIABETIC POLYNEUROPATHY ASSOCIATED WITH TYPE 2 DIABETES MELLITUS: ICD-10-CM

## 2022-05-02 DIAGNOSIS — E11.9 TYPE 2 DIABETES MELLITUS WITHOUT COMPLICATION, WITHOUT LONG-TERM CURRENT USE OF INSULIN: ICD-10-CM

## 2022-05-02 DIAGNOSIS — E11.9 NON-INSULIN DEPENDENT TYPE 2 DIABETES MELLITUS: Primary | ICD-10-CM

## 2022-05-02 DIAGNOSIS — I10 ESSENTIAL HYPERTENSION: ICD-10-CM

## 2022-05-02 DIAGNOSIS — Z63.4 GRIEF AT LOSS OF CHILD: ICD-10-CM

## 2022-05-02 DIAGNOSIS — L20.84 INTRINSIC ECZEMA: ICD-10-CM

## 2022-05-02 DIAGNOSIS — F43.21 GRIEF AT LOSS OF CHILD: ICD-10-CM

## 2022-05-02 LAB — HBA1C MFR BLD: 7.2 %

## 2022-05-02 PROCEDURE — 99214 PR OFFICE/OUTPT VISIT, EST, LEVL IV, 30-39 MIN: ICD-10-PCS | Mod: S$GLB,,, | Performed by: FAMILY MEDICINE

## 2022-05-02 PROCEDURE — 99214 OFFICE O/P EST MOD 30 MIN: CPT | Mod: S$GLB,,, | Performed by: FAMILY MEDICINE

## 2022-05-02 PROCEDURE — 3051F HG A1C>EQUAL 7.0%<8.0%: CPT | Mod: S$GLB,,, | Performed by: FAMILY MEDICINE

## 2022-05-02 PROCEDURE — 3008F BODY MASS INDEX DOCD: CPT | Mod: S$GLB,,, | Performed by: FAMILY MEDICINE

## 2022-05-02 PROCEDURE — 4010F PR ACE/ARB THEARPY RXD/TAKEN: ICD-10-PCS | Mod: S$GLB,,, | Performed by: FAMILY MEDICINE

## 2022-05-02 PROCEDURE — 3051F PR MOST RECENT HEMOGLOBIN A1C LEVEL 7.0 - < 8.0%: ICD-10-PCS | Mod: S$GLB,,, | Performed by: FAMILY MEDICINE

## 2022-05-02 PROCEDURE — 3008F PR BODY MASS INDEX (BMI) DOCUMENTED: ICD-10-PCS | Mod: S$GLB,,, | Performed by: FAMILY MEDICINE

## 2022-05-02 PROCEDURE — 83036 POCT HEMOGLOBIN A1C: ICD-10-PCS | Mod: QW,,, | Performed by: FAMILY MEDICINE

## 2022-05-02 PROCEDURE — 4010F ACE/ARB THERAPY RXD/TAKEN: CPT | Mod: S$GLB,,, | Performed by: FAMILY MEDICINE

## 2022-05-02 PROCEDURE — 3060F PR POS MICROALBUMINURIA RESULT DOCUMENTED/REVIEW: ICD-10-PCS | Mod: S$GLB,,, | Performed by: FAMILY MEDICINE

## 2022-05-02 PROCEDURE — 83036 HEMOGLOBIN GLYCOSYLATED A1C: CPT | Mod: QW,,, | Performed by: FAMILY MEDICINE

## 2022-05-02 PROCEDURE — 3060F POS MICROALBUMINURIA REV: CPT | Mod: S$GLB,,, | Performed by: FAMILY MEDICINE

## 2022-05-02 PROCEDURE — 3066F NEPHROPATHY DOC TX: CPT | Mod: S$GLB,,, | Performed by: FAMILY MEDICINE

## 2022-05-02 PROCEDURE — 3066F PR DOCUMENTATION OF TREATMENT FOR NEPHROPATHY: ICD-10-PCS | Mod: S$GLB,,, | Performed by: FAMILY MEDICINE

## 2022-05-02 RX ORDER — TRIAMCINOLONE ACETONIDE 1 MG/G
CREAM TOPICAL 2 TIMES DAILY
Qty: 30 G | Refills: 1 | Status: SHIPPED | OUTPATIENT
Start: 2022-05-02 | End: 2024-03-19 | Stop reason: SDUPTHER

## 2022-05-02 RX ORDER — ATENOLOL AND CHLORTHALIDONE TABLET 50; 25 MG/1; MG/1
1 TABLET ORAL DAILY
Qty: 90 TABLET | Refills: 1 | Status: SHIPPED | OUTPATIENT
Start: 2022-05-02 | End: 2022-08-01 | Stop reason: SDUPTHER

## 2022-05-02 RX ORDER — GLIPIZIDE 5 MG/1
TABLET ORAL
Qty: 90 TABLET | Refills: 1 | Status: SHIPPED | OUTPATIENT
Start: 2022-05-02 | End: 2022-08-01 | Stop reason: SDUPTHER

## 2022-05-02 RX ORDER — CHOLECALCIFEROL (VITAMIN D3) 25 MCG
2500 TABLET,CHEWABLE ORAL DAILY
Status: CANCELLED | OUTPATIENT
Start: 2022-05-02

## 2022-05-02 RX ORDER — TRAZODONE HYDROCHLORIDE 50 MG/1
50 TABLET ORAL NIGHTLY
Qty: 30 TABLET | Refills: 5 | Status: SHIPPED | OUTPATIENT
Start: 2022-05-02 | End: 2022-08-01 | Stop reason: SDUPTHER

## 2022-05-02 RX ORDER — LOSARTAN POTASSIUM 50 MG/1
50 TABLET ORAL DAILY
Qty: 90 TABLET | Refills: 1 | Status: SHIPPED | OUTPATIENT
Start: 2022-05-02 | End: 2022-08-01 | Stop reason: SDUPTHER

## 2022-05-02 RX ORDER — METFORMIN HYDROCHLORIDE 750 MG/1
750 TABLET, EXTENDED RELEASE ORAL 2 TIMES DAILY
Qty: 180 TABLET | Refills: 1 | Status: SHIPPED | OUTPATIENT
Start: 2022-05-02 | End: 2022-08-01 | Stop reason: SDUPTHER

## 2022-05-02 RX ORDER — POTASSIUM CHLORIDE 750 MG/1
CAPSULE, EXTENDED RELEASE ORAL
Qty: 270 CAPSULE | Refills: 1 | Status: SHIPPED | OUTPATIENT
Start: 2022-05-02 | End: 2022-08-01 | Stop reason: SDUPTHER

## 2022-05-02 SDOH — SOCIAL DETERMINANTS OF HEALTH (SDOH): DISSAPEARANCE AND DEATH OF FAMILY MEMBER: Z63.4

## 2022-05-02 NOTE — LETTER
[address] [facility] [address]  [place] [address]  Phone: [phone]   [phone]                        MD Stas Zambrano MD Chequita Williams, MD Matthew Bassett, PARADHA Bowman, SYLVIA Haley, SYLVIA José PA-C      Date: 05/02/2022        Patient: Angelina Jennings  YOB: 1960       Please send the most recent Eye Exam.        Sincerely,     Max Torrez MA

## 2022-05-02 NOTE — PROGRESS NOTES
SUBJECTIVE:    Patient ID: Angelina Jennings is a 61 y.o. female.    Chief Complaint: Diabetes (A1c , Foot exam, Mammogram  ordered // Foot pain and swelling //requested Eye exam Dr Mayberry //yen) and Medication Refill (Brought bottles // )    61-year-old female here for diabetic checkup.  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  She bowls weekly.    Recently lost her daughter to COVID at the age of 39. She is planning on building a Scayl at her home for her.    Diabetes type 2, now on Ozempic 1 mg weekly along for Min 750 mg b.i.d..  She is having some discomfort in her feet including a tight burning feeling that disrupts her sleep at night could be neuropathy.    She quit smoking 9 years ago, no alcohol intake.     2-colonoscopy with Dr. Stacy-RTC 5 years    2021-ophthalmology visit with Dr. Mayberry-    Gyn-Dr. Shannan Keller-Pap/mammogram/DEXA    Cholesterol 209 triglycerides 204.        Office Visit on 2022   Component Date Value Ref Range Status    Hemoglobin A1C 2022 7.2  % Final   Office Visit on 2022   Component Date Value Ref Range Status    Hemoglobin A1C 2022 7.8  % Final       Past Medical History:   Diagnosis Date    Diabetes mellitus, type 2     Hypertension      Social History     Socioeconomic History    Marital status:    Tobacco Use    Smoking status: Former Smoker     Packs/day: 1.50     Years: 35.00     Pack years: 52.50     Types: Cigarettes     Quit date: 2013     Years since quittin.7    Smokeless tobacco: Never Used   Substance and Sexual Activity    Alcohol use: Yes     Alcohol/week: 0.0 standard drinks     Comment: rarely    Drug use: Never     Social Determinants of Health     Financial Resource Strain: Low Risk     Difficulty of Paying Living Expenses: Not very hard   Food Insecurity: Unknown    Worried About Running Out of Food in the Last Year: Never true    Ran Out of Food in the Last Year:  Patient refused   Transportation Needs: No Transportation Needs    Lack of Transportation (Medical): No    Lack of Transportation (Non-Medical): No   Physical Activity: Insufficiently Active    Days of Exercise per Week: 1 day    Minutes of Exercise per Session: 60 min   Stress: Stress Concern Present    Feeling of Stress : Rather much   Social Connections: Unknown    Frequency of Communication with Friends and Family: Twice a week    Frequency of Social Gatherings with Friends and Family: Once a week    Active Member of Clubs or Organizations: Patient refused    Attends Club or Organization Meetings: Patient refused    Marital Status:    Housing Stability: Unknown    Unable to Pay for Housing in the Last Year: No    Unstable Housing in the Last Year: No     Past Surgical History:   Procedure Laterality Date    COLONOSCOPY  2022    Dr. Stacy-RTC 5 years    TONSILLECTOMY       Family History   Problem Relation Age of Onset    Cancer Maternal Aunt     Cancer Paternal Grandmother     Diabetes Maternal Grandfather     Early death Mother     Heart disease Father     Heart disease Paternal Grandfather        Review of patient's allergies indicates:  No Known Allergies    Current Outpatient Medications:     APPLE CIDER VINEGAR ORAL, Take by mouth., Disp: , Rfl:     ascorbic acid, vitamin C, (VITAMIN C) 500 MG tablet, Take 500 mg by mouth once daily., Disp: , Rfl:     aspirin 81 MG Chew, Take 81 mg by mouth once daily. , Disp: , Rfl:     b complex vitamins tablet, Take 1 tablet by mouth once daily., Disp: , Rfl:     blood sugar diagnostic (ONETOUCH ULTRA BLUE TEST STRIP) Strp, 30 strips by In Vitro route once daily. as directed, Disp: 30 strip, Rfl: 11    cholecalciferol, vitamin D3, (VITAMIN D3) 25 mcg (1,000 unit) capsule, Take 1,000 Units by mouth once daily., Disp: , Rfl:     cyanocobalamin, vitamin B-12, 2,500 mcg Tab, 2,500 mcg once daily. , Disp: , Rfl:     cyclobenzaprine  (FLEXERIL) 5 MG tablet, Take 1 tablet (5 mg total) by mouth 3 (three) times daily as needed for Muscle spasms., Disp: 30 tablet, Rfl: 0    diclofenac sodium (VOLTAREN) 1 % Gel, Apply 2 g topically 4 (four) times daily., Disp: 100 g, Rfl: 2    MAGNESIUM OXIDE ORAL, Take by mouth., Disp: , Rfl:     semaglutide (OZEMPIC) 1 mg/dose (4 mg/3 mL), Inject 1 mg into the skin every 7 days., Disp: 1 pen, Rfl: 5    zinc gluconate 50 mg tablet, Take 50 mg by mouth once daily., Disp: , Rfl:     atenoloL-chlorthalidone (TENORETIC) 50-25 mg Tab, Take 1 tablet by mouth once daily., Disp: 90 tablet, Rfl: 1    glipiZIDE (GLUCOTROL) 5 MG tablet, Take 1/2 tablet or 1 tablet every morning, Disp: 90 tablet, Rfl: 1    losartan (COZAAR) 50 MG tablet, Take 1 tablet (50 mg total) by mouth once daily., Disp: 90 tablet, Rfl: 1    metFORMIN (GLUCOPHAGE-XR) 750 MG ER 24hr tablet, Take 1 tablet (750 mg total) by mouth 2 (two) times daily., Disp: 180 tablet, Rfl: 1    potassium chloride (MICRO-K) 10 MEQ CpSR, Take 3 capsules by mouth once daily for 180 doses, Disp: 270 capsule, Rfl: 1    traZODone (DESYREL) 50 MG tablet, Take 1 tablet (50 mg total) by mouth every evening., Disp: 30 tablet, Rfl: 5    Review of Systems   Constitutional: Negative for appetite change, chills, fatigue, fever and unexpected weight change.   HENT: Negative for congestion, ear pain, sinus pain, sore throat and trouble swallowing.    Eyes: Negative for pain, discharge and visual disturbance.   Respiratory: Negative for apnea, cough, shortness of breath and wheezing.    Cardiovascular: Negative for chest pain, palpitations and leg swelling.   Gastrointestinal: Negative for abdominal pain, blood in stool, constipation, diarrhea, nausea and vomiting.   Endocrine: Negative for heat intolerance, polydipsia and polyuria.   Genitourinary: Negative for difficulty urinating, dyspareunia, dysuria, frequency, hematuria and menstrual problem.   Musculoskeletal: Negative for  "arthralgias, back pain, gait problem, joint swelling and myalgias.   Allergic/Immunologic: Negative for environmental allergies, food allergies and immunocompromised state.   Neurological: Positive for numbness (Feet have numbness and tingling and type peeling at night). Negative for dizziness, tremors, seizures and headaches.   Psychiatric/Behavioral: Negative for behavioral problems, confusion, hallucinations and suicidal ideas. The patient is not nervous/anxious.           Objective:      Vitals:    05/02/22 0836   Pulse: 66   Weight: 110.2 kg (243 lb)   Height: 5' 2.5" (1.588 m)     Physical Exam  Vitals and nursing note reviewed.   Constitutional:       General: She is not in acute distress.     Appearance: She is well-developed. She is obese. She is not toxic-appearing.   HENT:      Head: Normocephalic and atraumatic.      Right Ear: Tympanic membrane and external ear normal.      Left Ear: Tympanic membrane and external ear normal.      Nose: Nose normal.      Mouth/Throat:      Pharynx: Oropharynx is clear.   Eyes:      Pupils: Pupils are equal, round, and reactive to light.   Neck:      Thyroid: No thyromegaly.      Vascular: No carotid bruit.   Cardiovascular:      Rate and Rhythm: Normal rate and regular rhythm.      Heart sounds: Normal heart sounds. No murmur heard.  Pulmonary:      Effort: Pulmonary effort is normal.      Breath sounds: Normal breath sounds. No wheezing or rales.   Abdominal:      General: Bowel sounds are normal. There is no distension.      Palpations: Abdomen is soft.      Tenderness: There is no abdominal tenderness.   Musculoskeletal:         General: No tenderness or deformity. Normal range of motion.      Cervical back: Normal range of motion and neck supple.      Lumbar back: Normal. No spasms.      Comments: Bends 90 degrees at  waist, shoulders and knees have good range of motion.  Knees are crepitant.  No pitting edema to lower extremities   Feet:      Right foot:      " Protective Sensation: 5 sites tested. 5 sites sensed.      Skin integrity: No callus.      Left foot:      Protective Sensation: 5 sites tested. 5 sites sensed.      Skin integrity: No callus.   Lymphadenopathy:      Cervical: No cervical adenopathy.   Skin:     General: Skin is warm and dry.      Findings: No rash.   Neurological:      Mental Status: She is alert and oriented to person, place, and time.      Cranial Nerves: No cranial nerve deficit.      Coordination: Coordination normal.   Psychiatric:         Behavior: Behavior normal.         Thought Content: Thought content normal.         Judgment: Judgment normal.           Assessment:       1. Non-insulin dependent type 2 diabetes mellitus    2. Grief at loss of child    3. Type 2 diabetes mellitus without complication, without long-term current use of insulin    4. Essential hypertension    5. Diabetic polyneuropathy associated with type 2 diabetes mellitus         Plan:       Non-insulin dependent type 2 diabetes mellitus  A1c is 7.2 today, will add glipizide 5 mg half a tablet daily to lower sugar further  Grief at loss of child  Comments:  Sleep disturbances d/t grief. Discussed healthy sleep habits. Suggest Melatonin OTC titrated up to 10mg. add trazodone low dose.  Orders:  -     traZODone (DESYREL) 50 MG tablet; Take 1 tablet (50 mg total) by mouth every evening.  Dispense: 30 tablet; Refill: 5    Type 2 diabetes mellitus without complication, without long-term current use of insulin  Comments:  . Will increase Ozempic 1mg. Discussed healthy diet at [facility]. F/u in 3 months w/ repeat A1c.  Orders:  -     Hemoglobin A1C, POCT  -     metFORMIN (GLUCOPHAGE-XR) 750 MG ER 24hr tablet; Take 1 tablet (750 mg total) by mouth 2 (two) times daily.  Dispense: 180 tablet; Refill: 1  -     Foot Exam Performed  -     glipiZIDE (GLUCOTROL) 5 MG tablet; Take 1/2 tablet or 1 tablet every morning  Dispense: 90 tablet; Refill: 1    Essential hypertension  Comments:    Continue as is. Refills sent today.  Orders:  -     potassium chloride (MICRO-K) 10 MEQ CpSR; Take 3 capsules by mouth once daily for 180 doses  Dispense: 270 capsule; Refill: 1  -     losartan (COZAAR) 50 MG tablet; Take 1 tablet (50 mg total) by mouth once daily.  Dispense: 90 tablet; Refill: 1  -     atenoloL-chlorthalidone (TENORETIC) 50-25 mg Tab; Take 1 tablet by mouth once daily.  Dispense: 90 tablet; Refill: 1    Diabetic polyneuropathy associated with type 2 diabetes mellitus  If symptoms do not improve with better glycemic control, will consider adding gabapentin 100 mg nightly  needs labs in 3 months  Follow up in about 3 months (around 8/2/2022), or althea for Diabetic Check-Up.        5/2/2022 Shade Cota

## 2022-07-20 ENCOUNTER — TELEPHONE (OUTPATIENT)
Dept: FAMILY MEDICINE | Facility: CLINIC | Age: 62
End: 2022-07-20

## 2022-08-01 ENCOUNTER — OFFICE VISIT (OUTPATIENT)
Dept: FAMILY MEDICINE | Facility: CLINIC | Age: 62
End: 2022-08-01
Payer: COMMERCIAL

## 2022-08-01 VITALS
WEIGHT: 242.63 LBS | HEIGHT: 63 IN | SYSTOLIC BLOOD PRESSURE: 128 MMHG | OXYGEN SATURATION: 99 % | BODY MASS INDEX: 42.99 KG/M2 | DIASTOLIC BLOOD PRESSURE: 70 MMHG | HEART RATE: 75 BPM

## 2022-08-01 DIAGNOSIS — M62.838 MUSCLE SPASMS OF NECK: ICD-10-CM

## 2022-08-01 DIAGNOSIS — G62.9 PERIPHERAL POLYNEUROPATHY: ICD-10-CM

## 2022-08-01 DIAGNOSIS — F43.21 GRIEF AT LOSS OF CHILD: ICD-10-CM

## 2022-08-01 DIAGNOSIS — I10 ESSENTIAL HYPERTENSION: ICD-10-CM

## 2022-08-01 DIAGNOSIS — E11.9 TYPE 2 DIABETES MELLITUS WITHOUT COMPLICATION, WITHOUT LONG-TERM CURRENT USE OF INSULIN: Primary | ICD-10-CM

## 2022-08-01 DIAGNOSIS — Z12.31 OTHER SCREENING MAMMOGRAM: ICD-10-CM

## 2022-08-01 DIAGNOSIS — M54.12 CERVICAL RADICULOPATHY: ICD-10-CM

## 2022-08-01 DIAGNOSIS — Z63.4 GRIEF AT LOSS OF CHILD: ICD-10-CM

## 2022-08-01 LAB — HBA1C MFR BLD: 6.4 %

## 2022-08-01 PROCEDURE — 4010F PR ACE/ARB THEARPY RXD/TAKEN: ICD-10-PCS | Mod: CPTII,S$GLB,, | Performed by: PHYSICIAN ASSISTANT

## 2022-08-01 PROCEDURE — 3078F DIAST BP <80 MM HG: CPT | Mod: CPTII,S$GLB,, | Performed by: PHYSICIAN ASSISTANT

## 2022-08-01 PROCEDURE — 3051F PR MOST RECENT HEMOGLOBIN A1C LEVEL 7.0 - < 8.0%: ICD-10-PCS | Mod: CPTII,S$GLB,, | Performed by: PHYSICIAN ASSISTANT

## 2022-08-01 PROCEDURE — 3008F BODY MASS INDEX DOCD: CPT | Mod: CPTII,S$GLB,, | Performed by: PHYSICIAN ASSISTANT

## 2022-08-01 PROCEDURE — 3008F PR BODY MASS INDEX (BMI) DOCUMENTED: ICD-10-PCS | Mod: CPTII,S$GLB,, | Performed by: PHYSICIAN ASSISTANT

## 2022-08-01 PROCEDURE — 3074F PR MOST RECENT SYSTOLIC BLOOD PRESSURE < 130 MM HG: ICD-10-PCS | Mod: CPTII,S$GLB,, | Performed by: PHYSICIAN ASSISTANT

## 2022-08-01 PROCEDURE — 83036 HEMOGLOBIN GLYCOSYLATED A1C: CPT | Mod: QW,,, | Performed by: PHYSICIAN ASSISTANT

## 2022-08-01 PROCEDURE — 3066F NEPHROPATHY DOC TX: CPT | Mod: CPTII,S$GLB,, | Performed by: PHYSICIAN ASSISTANT

## 2022-08-01 PROCEDURE — 3060F POS MICROALBUMINURIA REV: CPT | Mod: CPTII,S$GLB,, | Performed by: PHYSICIAN ASSISTANT

## 2022-08-01 PROCEDURE — 4010F ACE/ARB THERAPY RXD/TAKEN: CPT | Mod: CPTII,S$GLB,, | Performed by: PHYSICIAN ASSISTANT

## 2022-08-01 PROCEDURE — 99214 OFFICE O/P EST MOD 30 MIN: CPT | Mod: S$GLB,,, | Performed by: PHYSICIAN ASSISTANT

## 2022-08-01 PROCEDURE — 1159F MED LIST DOCD IN RCRD: CPT | Mod: CPTII,S$GLB,, | Performed by: PHYSICIAN ASSISTANT

## 2022-08-01 PROCEDURE — 3078F PR MOST RECENT DIASTOLIC BLOOD PRESSURE < 80 MM HG: ICD-10-PCS | Mod: CPTII,S$GLB,, | Performed by: PHYSICIAN ASSISTANT

## 2022-08-01 PROCEDURE — 1159F PR MEDICATION LIST DOCUMENTED IN MEDICAL RECORD: ICD-10-PCS | Mod: CPTII,S$GLB,, | Performed by: PHYSICIAN ASSISTANT

## 2022-08-01 PROCEDURE — 1160F PR REVIEW ALL MEDS BY PRESCRIBER/CLIN PHARMACIST DOCUMENTED: ICD-10-PCS | Mod: CPTII,S$GLB,, | Performed by: PHYSICIAN ASSISTANT

## 2022-08-01 PROCEDURE — 3060F PR POS MICROALBUMINURIA RESULT DOCUMENTED/REVIEW: ICD-10-PCS | Mod: CPTII,S$GLB,, | Performed by: PHYSICIAN ASSISTANT

## 2022-08-01 PROCEDURE — 3051F HG A1C>EQUAL 7.0%<8.0%: CPT | Mod: CPTII,S$GLB,, | Performed by: PHYSICIAN ASSISTANT

## 2022-08-01 PROCEDURE — 83036 POCT HEMOGLOBIN A1C: ICD-10-PCS | Mod: QW,,, | Performed by: PHYSICIAN ASSISTANT

## 2022-08-01 PROCEDURE — 3066F PR DOCUMENTATION OF TREATMENT FOR NEPHROPATHY: ICD-10-PCS | Mod: CPTII,S$GLB,, | Performed by: PHYSICIAN ASSISTANT

## 2022-08-01 PROCEDURE — 99214 PR OFFICE/OUTPT VISIT, EST, LEVL IV, 30-39 MIN: ICD-10-PCS | Mod: S$GLB,,, | Performed by: PHYSICIAN ASSISTANT

## 2022-08-01 PROCEDURE — 1160F RVW MEDS BY RX/DR IN RCRD: CPT | Mod: CPTII,S$GLB,, | Performed by: PHYSICIAN ASSISTANT

## 2022-08-01 PROCEDURE — 3074F SYST BP LT 130 MM HG: CPT | Mod: CPTII,S$GLB,, | Performed by: PHYSICIAN ASSISTANT

## 2022-08-01 RX ORDER — LANCETS
EACH MISCELLANEOUS
Qty: 200 EACH | Refills: 1 | Status: SHIPPED | OUTPATIENT
Start: 2022-08-01 | End: 2022-11-02

## 2022-08-01 RX ORDER — POTASSIUM CHLORIDE 750 MG/1
CAPSULE, EXTENDED RELEASE ORAL
Qty: 270 CAPSULE | Refills: 1 | Status: SHIPPED | OUTPATIENT
Start: 2022-08-01 | End: 2022-11-02 | Stop reason: SDUPTHER

## 2022-08-01 RX ORDER — LOSARTAN POTASSIUM 50 MG/1
50 TABLET ORAL DAILY
Qty: 90 TABLET | Refills: 1 | Status: SHIPPED | OUTPATIENT
Start: 2022-08-01 | End: 2022-11-02 | Stop reason: SDUPTHER

## 2022-08-01 RX ORDER — CYCLOBENZAPRINE HYDROCHLORIDE 7.5 MG/1
7.5 TABLET, FILM COATED ORAL 3 TIMES DAILY PRN
Qty: 30 TABLET | Refills: 0 | Status: SHIPPED | OUTPATIENT
Start: 2022-08-01 | End: 2022-08-11

## 2022-08-01 RX ORDER — INSULIN PUMP SYRINGE, 3 ML
EACH MISCELLANEOUS
Qty: 1 EACH | Refills: 0 | Status: SHIPPED | OUTPATIENT
Start: 2022-08-01 | End: 2022-11-02

## 2022-08-01 RX ORDER — GLIPIZIDE 5 MG/1
TABLET ORAL
Qty: 90 TABLET | Refills: 1 | Status: SHIPPED | OUTPATIENT
Start: 2022-08-01 | End: 2022-11-02 | Stop reason: SDUPTHER

## 2022-08-01 RX ORDER — GABAPENTIN 100 MG/1
100 CAPSULE ORAL NIGHTLY
Qty: 30 CAPSULE | Refills: 1 | Status: SHIPPED | OUTPATIENT
Start: 2022-08-01 | End: 2022-10-03

## 2022-08-01 RX ORDER — ATENOLOL AND CHLORTHALIDONE TABLET 50; 25 MG/1; MG/1
1 TABLET ORAL DAILY
Qty: 90 TABLET | Refills: 1 | Status: SHIPPED | OUTPATIENT
Start: 2022-08-01 | End: 2022-11-02 | Stop reason: SDUPTHER

## 2022-08-01 RX ORDER — TRAZODONE HYDROCHLORIDE 50 MG/1
50 TABLET ORAL NIGHTLY
Qty: 90 TABLET | Refills: 1 | Status: SHIPPED | OUTPATIENT
Start: 2022-08-01 | End: 2022-11-02 | Stop reason: SDUPTHER

## 2022-08-01 RX ORDER — METFORMIN HYDROCHLORIDE 750 MG/1
750 TABLET, EXTENDED RELEASE ORAL 2 TIMES DAILY
Qty: 180 TABLET | Refills: 1 | Status: SHIPPED | OUTPATIENT
Start: 2022-08-01 | End: 2022-11-02 | Stop reason: SDUPTHER

## 2022-08-01 SDOH — SOCIAL DETERMINANTS OF HEALTH (SDOH): DISSAPEARANCE AND DEATH OF FAMILY MEMBER: Z63.4

## 2022-08-01 NOTE — PROGRESS NOTES
SUBJECTIVE:    Patient ID: Angelina Jennings is a 61 y.o. female.    Chief Complaint: Follow-up (Brought bottles/pna questions/ mammo ordered/ would like flexerils refilled due to spasms and some tingling on the right side//dp)    Pt is a 61 y.o. female who presents today for a routine 3 month follow-up.  Today, she has a new complaint of cervical radiculopathy symptoms located on the right side, around C4.  This started > 2 months ago after. She denies any known inciting evernt, fall, or trauma. Only possible cause that she can recall is sleeping on her right side often.  The radiculopathy symptom tends to radiate down her right arm into her right hand.  She denies any weakness or muscular atrophy in the right upper extremity.  Postural alignment and applying heat to the cervical region alleviates the symptoms.  Nothing tends to exacerbate it.    Regarding her history of DM, she is currently managed on Ozempic 1mg q7 days, Metformin 750mg b.i.d., and Glipizide 5 mg qd.  Blood sugar at home averages in the 140s and she denies any hypoglycemic events.  A1c today in office:  6.4%, improved from 7.2% 3 months ago.  Currently, she does not follow any particular diet and averages 3 meals/day.  Her main source of exercise at this time is gardening and yardwork.  During the winter months, she bowls weekly.  Today, she reports tingling and burning sensation in the bilateral lower extremities, extending to the mid shins.  These peripheral neuropathy symptoms occasionally prevent her from sleeping 1x/week.  Foot exam is up-to-date.  Due for diabetic eye exam.    BP is stable and well controlled in office today.  She does not routinely check her BP at home, so no log provided today.  She denies any palpitations, CP, dizziness, syncopal episodes, or lightheadedness.    UTD: colonoscopy (03/25/2022) - Dr. Stacy - RTC 5 years.    Last mammogram (09/16/2021) - negative.    Office Visit on 05/02/2022   Component Date Value Ref Range  Status    Hemoglobin A1C 05/02/2022 7.2  % Final       Past Medical History:   Diagnosis Date    Diabetes mellitus, type 2     Hypertension      Past Surgical History:   Procedure Laterality Date    COLONOSCOPY  2022    Dr. Stacy-RTC 5 years    TONSILLECTOMY       Family History   Problem Relation Age of Onset    Cancer Maternal Aunt     Cancer Paternal Grandmother     Diabetes Maternal Grandfather     Early death Mother     Heart disease Father     Heart disease Paternal Grandfather        Marital Status:   Alcohol History:  reports current alcohol use.  Tobacco History:  reports that she quit smoking about 8 years ago. Her smoking use included cigarettes. She has a 52.50 pack-year smoking history. She has never used smokeless tobacco.  Drug History:  reports no history of drug use.    Health Maintenance Topics with due status: Not Due       Topic Last Completion Date    Eye Exam 10/13/2021    Cervical Cancer Screening 10/14/2021    Influenza Vaccine 11/14/2021    Diabetes Urine Screening 01/26/2022    Lipid Panel 01/26/2022    Colorectal Cancer Screening 03/25/2022    Foot Exam 05/02/2022    Hemoglobin A1c 05/02/2022     Immunization History   Administered Date(s) Administered    COVID-19, MRNA, LN-S, PF (MODERNA FULL 0.5 ML DOSE) 03/21/2021, 04/19/2021, 10/30/2021    Influenza (FLUBLOK) - Quadrivalent - Recombinant - PF *Preferred* (egg allergy) 11/17/2019, 09/12/2020, 11/14/2021    Influenza - Quadrivalent - PF *Preferred* (6 months and older) 12/02/2017, 11/06/2018    Pneumococcal Polysaccharide - 23 Valent 11/06/2018       Review of patient's allergies indicates:  No Known Allergies    Current Outpatient Medications:     ascorbic acid, vitamin C, (VITAMIN C) 500 MG tablet, Take 500 mg by mouth once daily., Disp: , Rfl:     aspirin 81 MG Chew, Take 81 mg by mouth once daily. , Disp: , Rfl:     b complex vitamins tablet, Take 1 tablet by mouth once daily., Disp: , Rfl:     blood sugar  diagnostic (ONETOUCH ULTRA BLUE TEST STRIP) Strp, 30 strips by In Vitro route once daily. as directed, Disp: 30 strip, Rfl: 11    cholecalciferol, vitamin D3, (VITAMIN D3) 25 mcg (1,000 unit) capsule, Take 1,000 Units by mouth once daily., Disp: , Rfl:     cyanocobalamin, vitamin B-12, 2,500 mcg Tab, 2,500 mcg once daily. , Disp: , Rfl:     cyclobenzaprine (FLEXERIL) 5 MG tablet, Take 1 tablet (5 mg total) by mouth 3 (three) times daily as needed for Muscle spasms., Disp: 30 tablet, Rfl: 0    diclofenac sodium (VOLTAREN) 1 % Gel, Apply 2 g topically 4 (four) times daily., Disp: 100 g, Rfl: 2    triamcinolone acetonide 0.1% (KENALOG) 0.1 % cream, Apply topically 2 (two) times daily. To ear eczema, Disp: 30 g, Rfl: 1    zinc gluconate 50 mg tablet, Take 50 mg by mouth once daily., Disp: , Rfl:     APPLE CIDER VINEGAR ORAL, Take by mouth., Disp: , Rfl:     atenoloL-chlorthalidone (TENORETIC) 50-25 mg Tab, Take 1 tablet by mouth once daily., Disp: 90 tablet, Rfl: 1    blood sugar diagnostic Strp, To check BG 1 time daily, to use with insurance preferred meter, Disp: 200 each, Rfl: 1    blood-glucose meter kit, To check BG 1 times daily, to use with insurance preferred meter, Disp: 1 each, Rfl: 0    cyclobenzaprine (FEXMID) 7.5 MG Tab, Take 1 tablet (7.5 mg total) by mouth 3 (three) times daily as needed., Disp: 30 tablet, Rfl: 0    gabapentin (NEURONTIN) 100 MG capsule, Take 1 capsule (100 mg total) by mouth every evening., Disp: 30 capsule, Rfl: 1    glipiZIDE (GLUCOTROL) 5 MG tablet, Take 1/2 tablet or 1 tablet every morning, Disp: 90 tablet, Rfl: 1    lancets Misc, To check BG 1 times daily, to use with insurance preferred meter, Disp: 200 each, Rfl: 1    losartan (COZAAR) 50 MG tablet, Take 1 tablet (50 mg total) by mouth once daily., Disp: 90 tablet, Rfl: 1    MAGNESIUM OXIDE ORAL, Take by mouth., Disp: , Rfl:     metFORMIN (GLUCOPHAGE-XR) 750 MG ER 24hr tablet, Take 1 tablet (750 mg total) by  "mouth 2 (two) times daily., Disp: 180 tablet, Rfl: 1    potassium chloride (MICRO-K) 10 MEQ CpSR, Take 3 capsules by mouth once daily for 180 doses, Disp: 270 capsule, Rfl: 1    traZODone (DESYREL) 50 MG tablet, Take 1 tablet (50 mg total) by mouth every evening., Disp: 90 tablet, Rfl: 1    Review of Systems   Constitutional: Negative for activity change, appetite change, chills, fatigue and fever.   Respiratory: Negative for cough, shortness of breath and wheezing.    Cardiovascular: Negative for chest pain, palpitations and leg swelling.   Gastrointestinal: Negative for abdominal pain, constipation, diarrhea, nausea and vomiting.   Genitourinary: Negative for difficulty urinating, dysuria, frequency and hematuria.   Musculoskeletal: Positive for neck stiffness. Negative for arthralgias and myalgias.   Neurological: Positive for numbness. Negative for dizziness, syncope, weakness, light-headedness and headaches.        "Burning and tingling feeling in the feet."   Psychiatric/Behavioral: Negative for behavioral problems.          Objective:      Vitals:    08/01/22 0812   BP: 128/70   Pulse: 75   SpO2: 99%   Weight: 110 kg (242 lb 9.6 oz)   Height: 5' 2.5" (1.588 m)     Physical Exam  Vitals and nursing note reviewed.   Constitutional:       General: She is not in acute distress.     Appearance: Normal appearance. She is obese. She is not ill-appearing, toxic-appearing or diaphoretic.      Comments: Morbidly obese WF sitting erect in an office chair in no acute distress.   HENT:      Head: Normocephalic and atraumatic.      Right Ear: External ear normal.      Left Ear: External ear normal.      Nose: Nose normal. No rhinorrhea.      Mouth/Throat:      Mouth: Mucous membranes are moist.      Pharynx: Oropharynx is clear.   Eyes:      General: No scleral icterus.     Extraocular Movements: Extraocular movements intact.      Conjunctiva/sclera: Conjunctivae normal.      Pupils: Pupils are equal, round, and reactive " to light.   Neck:      Comments: Neck has full ROM on flexion, extension, and bilateral rotation.  Cardiovascular:      Rate and Rhythm: Normal rate and regular rhythm.      Pulses: Normal pulses.      Heart sounds: Normal heart sounds. No murmur heard.    No friction rub.   Pulmonary:      Effort: Pulmonary effort is normal. No respiratory distress.      Breath sounds: Normal breath sounds. No wheezing, rhonchi or rales.   Abdominal:      Palpations: Abdomen is soft.      Tenderness: There is no abdominal tenderness. There is no guarding or rebound.   Musculoskeletal:         General: No tenderness. Normal range of motion.        Arms:       Cervical back: Normal range of motion and neck supple. No rigidity or tenderness.      Right lower leg: No edema.      Left lower leg: No edema.      Comments: Bilateral shoulders have full ROM.  5/5 strength against resistance in the bilateral upper extremities.  5/5  strength noted bilaterally.  No atrophy in the thenar region appreciated on inspection.   Lymphadenopathy:      Cervical: No cervical adenopathy.   Skin:     General: Skin is warm and dry.      Findings: No bruising or rash.   Neurological:      General: No focal deficit present.      Mental Status: She is alert. Mental status is at baseline.      Cranial Nerves: No cranial nerve deficit.      Motor: No weakness.      Coordination: Coordination normal.      Gait: Gait normal.   Psychiatric:         Mood and Affect: Mood normal.         Behavior: Behavior normal. Behavior is cooperative.           Assessment:       1. Type 2 diabetes mellitus without complication, without long-term current use of insulin    2. Cervical radiculopathy    3. Muscle spasms of neck    4. Essential hypertension    5. Grief at loss of child    6. Other screening mammogram    7. Peripheral polyneuropathy           Plan:       Type 2 diabetes mellitus without complication, without long-term current use of insulin  Comments:  A1c today:  6.4%, improved from 7.2% 3 months ago.  Continue current medication regimen as is - refills sent today.  F/u in 3 months w/ repeat A1c.  Need for diabetic eye exam discussed with patient today.  She states she will contact her eye doctor and schedule an appointment.  Foot exam is UTD.  Orders:  -     metFORMIN (GLUCOPHAGE-XR) 750 MG ER 24hr tablet; Take 1 tablet (750 mg total) by mouth 2 (two) times daily.  Dispense: 180 tablet; Refill: 1  -     glipiZIDE (GLUCOTROL) 5 MG tablet; Take 1/2 tablet or 1 tablet every morning  Dispense: 90 tablet; Refill: 1  -     blood-glucose meter kit; To check BG 1 times daily, to use with insurance preferred meter  Dispense: 1 each; Refill: 0  -     lancets Misc; To check BG 1 times daily, to use with insurance preferred meter  Dispense: 200 each; Refill: 1  -     blood sugar diagnostic Strp; To check BG 1 time daily, to use with insurance preferred meter  Dispense: 200 each; Refill: 1  -     Hemoglobin A1C, POCT    Cervical radiculopathy  Start physical therapy - Amb ref sent to [facility] today.   Begin home neck stretching exercises and continue p.r.n. OTC analgesics for pain relief.    Muscle spasms of neck  Start Cyclobenzaprine 7.5mg p.r.n x 10 days.   -     cyclobenzaprine (FEXMID) 7.5 MG Tab; Take 1 tablet (7.5 mg total) by mouth 3 (three) times daily as needed.  Dispense: 30 tablet; Refill: 0    Essential hypertension  Comments:  BP is stable and well controlled.  Continue as is. Refills sent today.  Orders:  -     losartan (COZAAR) 50 MG tablet; Take 1 tablet (50 mg total) by mouth once daily.  Dispense: 90 tablet; Refill: 1  -     potassium chloride (MICRO-K) 10 MEQ CpSR; Take 3 capsules by mouth once daily for 180 doses  Dispense: 270 capsule; Refill: 1  -     atenoloL-chlorthalidone (TENORETIC) 50-25 mg Tab; Take 1 tablet by mouth once daily.  Dispense: 90 tablet; Refill: 1    Grief at loss of child  Comments:  Currently stable and well controlled.  Continue as  is..  Orders:  -     traZODone (DESYREL) 50 MG tablet; Take 1 tablet (50 mg total) by mouth every evening.  Dispense: 90 tablet; Refill: 1    Other screening mammogram  Screening mammogram ordered today.  -     Mammo Digital Screening Bilat; Future; Expected date: 08/01/2022    Peripheral polyneuropathy  Start Gabapentin 100mg q.h.s..  Strict blood sugar control was stressed to patient today to slow the progression of peripheral neuropathy.   Increase daily exercise.   -     gabapentin (NEURONTIN) 100 MG capsule; Take 1 capsule (100 mg total) by mouth every evening.  Dispense: 30 capsule; Refill: 1      Follow up in about 3 months (around 11/1/2022) for Diabetic Check-Up.        8/1/2022 Sidney José PA-C

## 2022-08-02 DIAGNOSIS — G62.9 PERIPHERAL POLYNEUROPATHY: ICD-10-CM

## 2022-08-02 DIAGNOSIS — M62.838 MUSCLE SPASMS OF NECK: ICD-10-CM

## 2022-08-02 LAB
ALBUMIN SERPL-MCNC: 4.4 G/DL (ref 3.6–5.1)
ALBUMIN/CREAT UR: 36 MCG/MG CREAT
ALBUMIN/GLOB SERPL: 1.5 (CALC) (ref 1–2.5)
ALP SERPL-CCNC: 85 U/L (ref 37–153)
ALT SERPL-CCNC: 23 U/L (ref 6–29)
AST SERPL-CCNC: 16 U/L (ref 10–35)
BASOPHILS # BLD AUTO: 49 CELLS/UL (ref 0–200)
BASOPHILS NFR BLD AUTO: 0.5 %
BILIRUB SERPL-MCNC: 0.4 MG/DL (ref 0.2–1.2)
BUN SERPL-MCNC: 14 MG/DL (ref 7–25)
BUN/CREAT SERPL: ABNORMAL (CALC) (ref 6–22)
CALCIUM SERPL-MCNC: 10.2 MG/DL (ref 8.6–10.4)
CHLORIDE SERPL-SCNC: 94 MMOL/L (ref 98–110)
CHOLEST SERPL-MCNC: 197 MG/DL
CHOLEST/HDLC SERPL: 3.1 (CALC)
CO2 SERPL-SCNC: 31 MMOL/L (ref 20–32)
CREAT SERPL-MCNC: 0.56 MG/DL (ref 0.5–1.05)
CREAT UR-MCNC: 55 MG/DL (ref 20–275)
EGFR: 104 ML/MIN/1.73M2
EOSINOPHIL # BLD AUTO: 216 CELLS/UL (ref 15–500)
EOSINOPHIL NFR BLD AUTO: 2.2 %
ERYTHROCYTE [DISTWIDTH] IN BLOOD BY AUTOMATED COUNT: 13 % (ref 11–15)
GLOBULIN SER CALC-MCNC: 2.9 G/DL (CALC) (ref 1.9–3.7)
GLUCOSE SERPL-MCNC: 108 MG/DL (ref 65–99)
HBA1C MFR BLD: 6.6 % OF TOTAL HGB
HCT VFR BLD AUTO: 46.9 % (ref 35–45)
HDLC SERPL-MCNC: 64 MG/DL
HGB BLD-MCNC: 15.4 G/DL (ref 11.7–15.5)
LDLC SERPL CALC-MCNC: 96 MG/DL (CALC)
LYMPHOCYTES # BLD AUTO: 2391 CELLS/UL (ref 850–3900)
LYMPHOCYTES NFR BLD AUTO: 24.4 %
MCH RBC QN AUTO: 28.6 PG (ref 27–33)
MCHC RBC AUTO-ENTMCNC: 32.8 G/DL (ref 32–36)
MCV RBC AUTO: 87 FL (ref 80–100)
MICROALBUMIN UR-MCNC: 2 MG/DL
MONOCYTES # BLD AUTO: 578 CELLS/UL (ref 200–950)
MONOCYTES NFR BLD AUTO: 5.9 %
NEUTROPHILS # BLD AUTO: 6566 CELLS/UL (ref 1500–7800)
NEUTROPHILS NFR BLD AUTO: 67 %
NONHDLC SERPL-MCNC: 133 MG/DL (CALC)
PLATELET # BLD AUTO: 358 THOUSAND/UL (ref 140–400)
PMV BLD REES-ECKER: 9.2 FL (ref 7.5–12.5)
POTASSIUM SERPL-SCNC: 4.2 MMOL/L (ref 3.5–5.3)
PROT SERPL-MCNC: 7.3 G/DL (ref 6.1–8.1)
RBC # BLD AUTO: 5.39 MILLION/UL (ref 3.8–5.1)
SODIUM SERPL-SCNC: 135 MMOL/L (ref 135–146)
TRIGL SERPL-MCNC: 244 MG/DL
WBC # BLD AUTO: 9.8 THOUSAND/UL (ref 3.8–10.8)

## 2022-08-02 RX ORDER — CYCLOBENZAPRINE HYDROCHLORIDE 7.5 MG/1
7.5 TABLET, FILM COATED ORAL 3 TIMES DAILY PRN
Qty: 30 TABLET | Refills: 0 | Status: CANCELLED | OUTPATIENT
Start: 2022-08-02 | End: 2022-08-12

## 2022-08-02 RX ORDER — GABAPENTIN 100 MG/1
100 CAPSULE ORAL NIGHTLY
Qty: 30 CAPSULE | Refills: 1 | Status: CANCELLED | OUTPATIENT
Start: 2022-08-02 | End: 2023-08-02

## 2022-08-02 NOTE — TELEPHONE ENCOUNTER
Pt is needing her cyclobenzaprine and gabapentin refilled. Medications were filled yesterday.  Spoke with pt and verbalized that the medication was sent yesterday. Pt acknowledge understanding.

## 2022-08-03 ENCOUNTER — TELEPHONE (OUTPATIENT)
Dept: FAMILY MEDICINE | Facility: CLINIC | Age: 62
End: 2022-08-03

## 2022-08-03 NOTE — PROGRESS NOTES
Call patient.  Lab work looks good.  CBC shows no anemia.  A1c is 6.6 showing good control diabetes.  Sugar kidneys and liver looks stable.  Cholesterol 197 triglycerides still mildly elevated 244.  Cut back on fried foods and fast foods.  Continue current medication

## 2022-08-03 NOTE — TELEPHONE ENCOUNTER
----- Message from Shade Cota MD sent at 8/3/2022  7:59 AM CDT -----  Call patient.  Lab work looks good.  CBC shows no anemia.  A1c is 6.6 showing good control diabetes.  Sugar kidneys and liver looks stable.  Cholesterol 197 triglycerides still mildly elevated 244.  Cut back on fried foods and fast foods.  Continue current medication

## 2022-08-19 ENCOUNTER — PATIENT MESSAGE (OUTPATIENT)
Dept: FAMILY MEDICINE | Facility: CLINIC | Age: 62
End: 2022-08-19

## 2022-08-19 DIAGNOSIS — E11.9 TYPE 2 DIABETES MELLITUS WITHOUT COMPLICATION, WITHOUT LONG-TERM CURRENT USE OF INSULIN: Primary | ICD-10-CM

## 2022-09-30 ENCOUNTER — HOSPITAL ENCOUNTER (OUTPATIENT)
Dept: RADIOLOGY | Facility: HOSPITAL | Age: 62
Discharge: HOME | End: 2022-09-30
Attending: PHYSICIAN ASSISTANT
Payer: COMMERCIAL

## 2022-09-30 VITALS — HEIGHT: 63 IN | WEIGHT: 242.5 LBS | BODY MASS INDEX: 42.97 KG/M2

## 2022-09-30 DIAGNOSIS — Z12.31 OTHER SCREENING MAMMOGRAM: ICD-10-CM

## 2022-09-30 PROCEDURE — 77067 SCR MAMMO BI INCL CAD: CPT | Mod: TC,PO

## 2022-09-30 PROCEDURE — 77063 BREAST TOMOSYNTHESIS BI: CPT | Mod: TC,PO

## 2022-10-03 ENCOUNTER — TELEPHONE (OUTPATIENT)
Dept: FAMILY MEDICINE | Facility: CLINIC | Age: 62
End: 2022-10-03

## 2022-10-03 NOTE — TELEPHONE ENCOUNTER
----- Message from ALFRED Galarza sent at 9/30/2022  3:00 PM CDT -----  Please contact patient and inform her that her mammogram is NEGATIVE.  Repeat in 1 year.

## 2022-11-02 ENCOUNTER — OFFICE VISIT (OUTPATIENT)
Dept: FAMILY MEDICINE | Facility: CLINIC | Age: 62
End: 2022-11-02
Payer: COMMERCIAL

## 2022-11-02 VITALS
HEIGHT: 63 IN | DIASTOLIC BLOOD PRESSURE: 80 MMHG | TEMPERATURE: 98 F | BODY MASS INDEX: 43.77 KG/M2 | SYSTOLIC BLOOD PRESSURE: 134 MMHG | HEART RATE: 72 BPM | WEIGHT: 247 LBS

## 2022-11-02 DIAGNOSIS — E11.9 TYPE 2 DIABETES MELLITUS WITHOUT COMPLICATION, WITHOUT LONG-TERM CURRENT USE OF INSULIN: Primary | ICD-10-CM

## 2022-11-02 DIAGNOSIS — I10 ESSENTIAL HYPERTENSION: ICD-10-CM

## 2022-11-02 DIAGNOSIS — Z63.4 GRIEF AT LOSS OF CHILD: ICD-10-CM

## 2022-11-02 DIAGNOSIS — M54.12 CERVICAL RADICULOPATHY: ICD-10-CM

## 2022-11-02 DIAGNOSIS — G62.9 PERIPHERAL POLYNEUROPATHY: ICD-10-CM

## 2022-11-02 DIAGNOSIS — E78.1 HYPERTRIGLYCERIDEMIA: ICD-10-CM

## 2022-11-02 DIAGNOSIS — F43.21 GRIEF AT LOSS OF CHILD: ICD-10-CM

## 2022-11-02 LAB — HBA1C MFR BLD: 6.8 %

## 2022-11-02 PROCEDURE — 83036 HEMOGLOBIN GLYCOSYLATED A1C: CPT | Mod: QW,,, | Performed by: PHYSICIAN ASSISTANT

## 2022-11-02 PROCEDURE — 1160F PR REVIEW ALL MEDS BY PRESCRIBER/CLIN PHARMACIST DOCUMENTED: ICD-10-PCS | Mod: CPTII,S$GLB,, | Performed by: PHYSICIAN ASSISTANT

## 2022-11-02 PROCEDURE — 1160F RVW MEDS BY RX/DR IN RCRD: CPT | Mod: CPTII,S$GLB,, | Performed by: PHYSICIAN ASSISTANT

## 2022-11-02 PROCEDURE — 4010F ACE/ARB THERAPY RXD/TAKEN: CPT | Mod: CPTII,S$GLB,, | Performed by: PHYSICIAN ASSISTANT

## 2022-11-02 PROCEDURE — 3079F PR MOST RECENT DIASTOLIC BLOOD PRESSURE 80-89 MM HG: ICD-10-PCS | Mod: CPTII,S$GLB,, | Performed by: PHYSICIAN ASSISTANT

## 2022-11-02 PROCEDURE — 3075F PR MOST RECENT SYSTOLIC BLOOD PRESS GE 130-139MM HG: ICD-10-PCS | Mod: CPTII,S$GLB,, | Performed by: PHYSICIAN ASSISTANT

## 2022-11-02 PROCEDURE — 3008F PR BODY MASS INDEX (BMI) DOCUMENTED: ICD-10-PCS | Mod: CPTII,S$GLB,, | Performed by: PHYSICIAN ASSISTANT

## 2022-11-02 PROCEDURE — 3060F PR POS MICROALBUMINURIA RESULT DOCUMENTED/REVIEW: ICD-10-PCS | Mod: CPTII,S$GLB,, | Performed by: PHYSICIAN ASSISTANT

## 2022-11-02 PROCEDURE — 1159F PR MEDICATION LIST DOCUMENTED IN MEDICAL RECORD: ICD-10-PCS | Mod: CPTII,S$GLB,, | Performed by: PHYSICIAN ASSISTANT

## 2022-11-02 PROCEDURE — 1159F MED LIST DOCD IN RCRD: CPT | Mod: CPTII,S$GLB,, | Performed by: PHYSICIAN ASSISTANT

## 2022-11-02 PROCEDURE — 99214 OFFICE O/P EST MOD 30 MIN: CPT | Mod: S$GLB,,, | Performed by: PHYSICIAN ASSISTANT

## 2022-11-02 PROCEDURE — 3066F NEPHROPATHY DOC TX: CPT | Mod: CPTII,S$GLB,, | Performed by: PHYSICIAN ASSISTANT

## 2022-11-02 PROCEDURE — 83036 POCT HEMOGLOBIN A1C: ICD-10-PCS | Mod: QW,,, | Performed by: PHYSICIAN ASSISTANT

## 2022-11-02 PROCEDURE — 3079F DIAST BP 80-89 MM HG: CPT | Mod: CPTII,S$GLB,, | Performed by: PHYSICIAN ASSISTANT

## 2022-11-02 PROCEDURE — 3008F BODY MASS INDEX DOCD: CPT | Mod: CPTII,S$GLB,, | Performed by: PHYSICIAN ASSISTANT

## 2022-11-02 PROCEDURE — 4010F PR ACE/ARB THEARPY RXD/TAKEN: ICD-10-PCS | Mod: CPTII,S$GLB,, | Performed by: PHYSICIAN ASSISTANT

## 2022-11-02 PROCEDURE — 3060F POS MICROALBUMINURIA REV: CPT | Mod: CPTII,S$GLB,, | Performed by: PHYSICIAN ASSISTANT

## 2022-11-02 PROCEDURE — 3044F PR MOST RECENT HEMOGLOBIN A1C LEVEL <7.0%: ICD-10-PCS | Mod: CPTII,S$GLB,, | Performed by: PHYSICIAN ASSISTANT

## 2022-11-02 PROCEDURE — 99214 PR OFFICE/OUTPT VISIT, EST, LEVL IV, 30-39 MIN: ICD-10-PCS | Mod: S$GLB,,, | Performed by: PHYSICIAN ASSISTANT

## 2022-11-02 PROCEDURE — 3066F PR DOCUMENTATION OF TREATMENT FOR NEPHROPATHY: ICD-10-PCS | Mod: CPTII,S$GLB,, | Performed by: PHYSICIAN ASSISTANT

## 2022-11-02 PROCEDURE — 3044F HG A1C LEVEL LT 7.0%: CPT | Mod: CPTII,S$GLB,, | Performed by: PHYSICIAN ASSISTANT

## 2022-11-02 PROCEDURE — 3075F SYST BP GE 130 - 139MM HG: CPT | Mod: CPTII,S$GLB,, | Performed by: PHYSICIAN ASSISTANT

## 2022-11-02 RX ORDER — POTASSIUM CHLORIDE 750 MG/1
CAPSULE, EXTENDED RELEASE ORAL
Qty: 270 CAPSULE | Refills: 1 | Status: SHIPPED | OUTPATIENT
Start: 2022-11-02 | End: 2023-02-06 | Stop reason: SDUPTHER

## 2022-11-02 RX ORDER — TRAZODONE HYDROCHLORIDE 50 MG/1
50 TABLET ORAL NIGHTLY
Qty: 90 TABLET | Refills: 1 | Status: SHIPPED | OUTPATIENT
Start: 2022-11-02 | End: 2023-02-06 | Stop reason: SDUPTHER

## 2022-11-02 RX ORDER — GABAPENTIN 100 MG/1
CAPSULE ORAL
Qty: 90 CAPSULE | Refills: 1 | Status: SHIPPED | OUTPATIENT
Start: 2022-11-02 | End: 2023-02-06 | Stop reason: SDUPTHER

## 2022-11-02 RX ORDER — ATENOLOL AND CHLORTHALIDONE TABLET 50; 25 MG/1; MG/1
1 TABLET ORAL DAILY
Qty: 90 TABLET | Refills: 1 | Status: SHIPPED | OUTPATIENT
Start: 2022-11-02 | End: 2023-02-06 | Stop reason: SDUPTHER

## 2022-11-02 RX ORDER — METFORMIN HYDROCHLORIDE 750 MG/1
750 TABLET, EXTENDED RELEASE ORAL 2 TIMES DAILY
Qty: 180 TABLET | Refills: 1 | Status: SHIPPED | OUTPATIENT
Start: 2022-11-02 | End: 2023-02-06 | Stop reason: SDUPTHER

## 2022-11-02 RX ORDER — SIMVASTATIN 10 MG/1
10 TABLET, FILM COATED ORAL NIGHTLY
Qty: 90 TABLET | Refills: 1 | Status: SHIPPED | OUTPATIENT
Start: 2022-11-02 | End: 2023-02-06 | Stop reason: SDUPTHER

## 2022-11-02 RX ORDER — LANCETS 30 GAUGE
EACH MISCELLANEOUS
COMMUNITY
Start: 2022-08-01

## 2022-11-02 RX ORDER — LOSARTAN POTASSIUM 50 MG/1
50 TABLET ORAL DAILY
Qty: 90 TABLET | Refills: 1 | Status: SHIPPED | OUTPATIENT
Start: 2022-11-02 | End: 2023-02-06 | Stop reason: SDUPTHER

## 2022-11-02 RX ORDER — LANCETS 33 GAUGE
EACH MISCELLANEOUS DAILY
COMMUNITY
Start: 2022-08-01

## 2022-11-02 RX ORDER — GLIPIZIDE 5 MG/1
TABLET ORAL
Qty: 90 TABLET | Refills: 1 | Status: SHIPPED | OUTPATIENT
Start: 2022-11-02 | End: 2023-02-06 | Stop reason: SDUPTHER

## 2022-11-02 RX ORDER — GABAPENTIN 100 MG/1
CAPSULE ORAL
Qty: 30 CAPSULE | Refills: 0 | Status: SHIPPED | OUTPATIENT
Start: 2022-11-02 | End: 2022-11-02 | Stop reason: SDUPTHER

## 2022-11-02 SDOH — SOCIAL DETERMINANTS OF HEALTH (SDOH): DISSAPEARANCE AND DEATH OF FAMILY MEMBER: Z63.4

## 2022-11-02 NOTE — PROGRESS NOTES
SUBJECTIVE:    Patient ID: Angelina Jennings is a 62 y.o. female.    Chief Complaint: Diabetes (3mth, brought bottles// SW)    Pt is 62 y.o. female who presents today for a 3 month DM II follow-up.  Overall, she reports feeling well and is without complaints today.  She does not follow any particular diet and averages 3 meals/day. Her main source of exercise at this time is gardening, yard work, and bowling once/week. She is currently managed on Ozempic 1mg q7 days, Metformin 750mg b.i.d., and Glipizide 5 mg qd - she remains compliant this treatment regimen.  A1c today is excellent at 6.8%.  Currently on ARB therapy (Losartan 50mg), but not on statin therapy secondary to a history of myalgia side effects.  UTD foot exam.  Eye exam completed yesterday with Dr. Mauro (ophthalmology).    On last visit, she reported peripheral neuropathy symptoms and was started on Gabapentin 100mg q.h.s..  She reports a significant improvement in her symptoms and only has to take her Gabapentin p.r.n. - approximately once/week.    Regarding her neck pain on last visit, she completed physical therapy ([facility]) and reports her symptoms have subsided.    UTD: mammogram (9/30/22) -  Negative.     UTD influenza vaccine (10/08/2022).  She received 1 dose of the Shingrix vaccine and is scheduled to receive the 2nd dose in December.  She has received 4 doses of the COVID 19 vaccine    Office Visit on 08/01/2022   Component Date Value Ref Range Status    Hemoglobin A1C 08/01/2022 6.4  % Final       Past Medical History:   Diagnosis Date    Diabetes mellitus, type 2     Hypertension      Past Surgical History:   Procedure Laterality Date    COLONOSCOPY  2022    Dr. Stacy-[facility] 5 years    TONSILLECTOMY       Family History   Problem Relation Age of Onset    Early death Mother     Heart disease Father     Breast cancer Maternal Aunt 75    Cancer Maternal Aunt     Diabetes Maternal Grandfather     Cancer Paternal Grandmother     Heart disease Paternal  Grandfather        Marital Status:   Alcohol History:  reports current alcohol use.  Tobacco History:  reports that she quit smoking about 9 years ago. Her smoking use included cigarettes. She has a 52.50 pack-year smoking history. She has never used smokeless tobacco.  Drug History:  reports no history of drug use.    Health Maintenance Topics with due status: Not Due       Topic Last Completion Date    Cervical Cancer Screening 10/14/2021    Colorectal Cancer Screening 03/25/2022    Foot Exam 05/02/2022    Diabetes Urine Screening 08/01/2022    Lipid Panel 08/01/2022    Hemoglobin A1c 08/01/2022    Mammogram 09/30/2022    Shingles Vaccine 10/15/2022     Immunization History   Administered Date(s) Administered    COVID-19, MRNA, LN-S, PF (MODERNA FULL 0.5 ML DOSE) 03/21/2021, 04/19/2021, 10/30/2021    COVID-19, mRNA, LNP-S, bivalent booster, PF (Moderna Omicron) 10/15/2022, 10/15/2022    Influenza (FLUBLOK) - Quadrivalent - Recombinant - PF *Preferred* (egg allergy) 11/17/2019, 09/12/2020, 11/14/2021    Influenza - Quadrivalent - PF *Preferred* (6 months and older) 12/02/2017, 11/06/2018, 10/08/2022    Pneumococcal Conjugate - 20 Valent 10/15/2022    Pneumococcal Polysaccharide - 23 Valent 11/06/2018    Zoster Recombinant 10/15/2022       Review of patient's allergies indicates:  No Known Allergies    Current Outpatient Medications:     ascorbic acid, vitamin C, (VITAMIN C) 500 MG tablet, Take 500 mg by mouth once daily., Disp: , Rfl:     aspirin 81 MG Chew, Take 81 mg by mouth once daily. , Disp: , Rfl:     b complex vitamins tablet, Take 1 tablet by mouth once daily., Disp: , Rfl:     blood sugar diagnostic (ONETOUCH ULTRA BLUE TEST STRIP) Strp, 30 strips by In Vitro route once daily. as directed, Disp: 30 strip, Rfl: 11    blood sugar diagnostic Strp, To check BG 1 time daily, to use with insurance preferred meter, Disp: 200 each, Rfl: 1    cholecalciferol, vitamin D3, (VITAMIN D3) 25 mcg (1,000 unit)  capsule, Take 1,000 Units by mouth once daily., Disp: , Rfl:     cyanocobalamin, vitamin B-12, 2,500 mcg Tab, 2,500 mcg once daily. , Disp: , Rfl:     cyclobenzaprine (FLEXERIL) 5 MG tablet, Take 1 tablet (5 mg total) by mouth 3 (three) times daily as needed for Muscle spasms., Disp: 30 tablet, Rfl: 0    diclofenac sodium (VOLTAREN) 1 % Gel, Apply 2 g topically 4 (four) times daily., Disp: 100 g, Rfl: 2    ONETOUCH DELICA PLUS LANCET 33 gauge Misc, Apply topically once daily., Disp: , Rfl:     [place]2 [place] [facility] [place] USE TO CHECK GLUCOSE ONCE DAILY, Disp: , Rfl:     triamcinolone acetonide 0.1% (KENALOG) 0.1 % cream, Apply topically 2 (two) times daily. To ear eczema, Disp: 30 g, Rfl: 1    zinc gluconate 50 mg tablet, Take 50 mg by mouth once daily., Disp: , Rfl:     atenoloL-chlorthalidone (TENORETIC) 50-25 mg Tab, Take 1 tablet by mouth once daily., Disp: 90 tablet, Rfl: 1    gabapentin (NEURONTIN) 100 MG capsule, TAKE 1 CAPSULE BY MOUTH IN THE EVENING Strength: 100 mg, Disp: 90 capsule, Rfl: 1    glipiZIDE (GLUCOTROL) 5 MG tablet, Take 1/2 tablet or 1 tablet every morning, Disp: 90 tablet, Rfl: 1    losartan (COZAAR) 50 MG tablet, Take 1 tablet (50 mg total) by mouth once daily., Disp: 90 tablet, Rfl: 1    metFORMIN (GLUCOPHAGE-XR) 750 MG ER 24hr tablet, Take 1 tablet (750 mg total) by mouth 2 (two) times daily., Disp: 180 tablet, Rfl: 1    potassium chloride (MICRO-K) 10 MEQ CpSR, Take 3 capsules by mouth once daily for 180 doses, Disp: 270 capsule, Rfl: 1    semaglutide (OZEMPIC) 1 mg/dose (4 mg/3 mL), Inject 1 mg into the skin every 7 days., Disp: 1 pen, Rfl: 5    simvastatin (ZOCOR) 10 MG tablet, Take 1 tablet (10 mg total) by mouth every evening., Disp: 90 tablet, Rfl: 1    traZODone (DESYREL) 50 MG tablet, Take 1 tablet (50 mg total) by mouth every evening., Disp: 90 tablet, Rfl: 1    Review of Systems   Constitutional:  Negative for activity change, appetite change, fatigue and fever.   Eyes:  Negative  "for visual disturbance.   Respiratory:  Negative for cough, shortness of breath and wheezing.    Cardiovascular:  Negative for chest pain, palpitations and leg swelling.   Gastrointestinal:  Negative for abdominal pain, constipation, diarrhea, nausea and vomiting.   Genitourinary:  Negative for decreased urine volume, difficulty urinating, dysuria, frequency and urgency.   Musculoskeletal:  Negative for arthralgias and myalgias.   Neurological:  Negative for dizziness, syncope, light-headedness and numbness.   Psychiatric/Behavioral:  Negative for behavioral problems.         Objective:      Vitals:    11/02/22 0750   BP: 134/80   Pulse: 72   Temp: 98.3 °F (36.8 °C)   Weight: 112 kg (247 lb)   Height: 5' 2.5" (1.588 m)     Physical Exam  Vitals and nursing note reviewed.   Constitutional:       General: She is not in acute distress.     Appearance: Normal appearance. She is obese. She is not ill-appearing or toxic-appearing.      Comments: Morbidly obese WF sitting erect in an office chair in no acute distress.   HENT:      Head: Normocephalic and atraumatic.      Right Ear: External ear normal.      Left Ear: External ear normal.      Nose: Nose normal. No rhinorrhea.      Mouth/Throat:      Mouth: Mucous membranes are moist.      Pharynx: Oropharynx is clear.   Eyes:      General: No scleral icterus.     Extraocular Movements: Extraocular movements intact.      Conjunctiva/sclera: Conjunctivae normal.   Neck:      Vascular: No carotid bruit.   Cardiovascular:      Rate and Rhythm: Normal rate and regular rhythm.      Pulses: Normal pulses.      Heart sounds: Normal heart sounds. No murmur heard.    No friction rub.   Pulmonary:      Effort: Pulmonary effort is normal.      Breath sounds: Normal breath sounds. No wheezing, rhonchi or rales.      Comments: Patient breathing comfortably on room air, with no use of respiratory accessory muscles noted on inspection.  Adequate breath sounds appreciated on auscultation " in the diffuse bilateral lung fields.  Abdominal:      General: There is no distension.      Palpations: Abdomen is soft.      Tenderness: There is no abdominal tenderness. There is no guarding or rebound.   Musculoskeletal:      Cervical back: Normal range of motion.      Right lower leg: No edema.      Left lower leg: No edema.   Lymphadenopathy:      Cervical: No cervical adenopathy.   Skin:     General: Skin is warm and dry.      Coloration: Skin is not pale.   Neurological:      General: No focal deficit present.      Mental Status: She is alert. Mental status is at baseline.      Cranial Nerves: No cranial nerve deficit.      Motor: No weakness.      Coordination: Coordination normal.      Gait: Gait normal.   Psychiatric:         Mood and Affect: Mood normal.         Behavior: Behavior normal. Behavior is cooperative.         Thought Content: Thought content normal.         Judgment: Judgment normal.         Assessment:       1. Type 2 diabetes mellitus without complication, without long-term current use of insulin    2. Peripheral polyneuropathy    3. Cervical radiculopathy    4. Essential hypertension    5. Grief at loss of child    6. Hypertriglyceridemia           Plan:       Type 2 diabetes mellitus without complication, without long-term current use of insulin  POCT A1c today:  6.8%.  Currently stable and well controlled - continue current treatment regimen as is.  Refills sent today.  Foot exam and eye exam are UTD.  Start Zocor 10 mg q.h.s. to optimize diabetic treatment regimen - repeat CMP and lipid panel in 3 months.  Actively on ARB therapy (Losartan 50mg) - continue as is.  -     Hemoglobin A1C, POCT  -     metFORMIN (GLUCOPHAGE-XR) 750 MG ER 24hr tablet; Take 1 tablet (750 mg total) by mouth 2 (two) times daily.  Dispense: 180 tablet; Refill: 1  -     glipiZIDE (GLUCOTROL) 5 MG tablet; Take 1/2 tablet or 1 tablet every morning  Dispense: 90 tablet; Refill: 1  -     semaglutide (OZEMPIC) 1  mg/dose (4 mg/3 mL); Inject 1 mg into the skin every 7 days.  Dispense: 1 pen; Refill: 5  -     simvastatin (ZOCOR) 10 MG tablet; Take 1 tablet (10 mg total) by mouth every evening.  Dispense: 90 tablet; Refill: 1  -     Comprehensive Metabolic Panel; Future; Expected date: 11/02/2022  -     Hemoglobin A1C; Future; Expected date: 11/02/2022  -     CBC Auto Differential; Future; Expected date: 11/02/2022  -     Microalbumin/Creatinine Ratio, Urine; Future  -     Urinalysis, Reflex to Urine Culture Urine, Clean Catch; Future; Expected date: 11/02/2022    Peripheral polyneuropathy  Significant improvement with Neurontin 100 mg q.h.s..  Continue as is - refill sent today.  Strict sugar control discussed to prevent progressing peripheral neuropathy symptoms.  -     gabapentin (NEURONTIN) 100 MG capsule; TAKE 1 CAPSULE BY MOUTH IN THE EVENING  Strength: 100 mg  Dispense: 90 capsule; Refill: 1    Cervical radiculopathy  Completed physical therapy ([facility]) - symptoms have since resolved with no limitations in her ADLs.    Essential hypertension  Stable and well controlled.  Continue as is - refills sent today.  -     atenoloL-chlorthalidone (TENORETIC) 50-25 mg Tab; Take 1 tablet by mouth once daily.  Dispense: 90 tablet; Refill: 1  -     losartan (COZAAR) 50 MG tablet; Take 1 tablet (50 mg total) by mouth once daily.  Dispense: 90 tablet; Refill: 1  -     potassium chloride (MICRO-K) 10 MEQ CpSR; Take 3 capsules by mouth once daily for 180 doses  Dispense: 270 capsule; Refill: 1  -     TSH w/reflex to FT4; Future; Expected date: 11/02/2022    Grief at loss of child  -     traZODone (DESYREL) 50 MG tablet; Take 1 tablet (50 mg total) by mouth every evening.  Dispense: 90 tablet; Refill: 1    Hypertriglyceridemia  Start Zocor 10 mg q.h.s..  Repeat CMP and lipid in 3 months.  -     Lipid Panel; Future; Expected date: 11/02/2022    Follow up in about 3 months (around 2/2/2023) for Annual Wellness, Diabetic Check-Up, With  labs prior to visit.        11/2/2022 Sidney José PA-C

## 2023-01-19 ENCOUNTER — TELEPHONE (OUTPATIENT)
Dept: FAMILY MEDICINE | Facility: CLINIC | Age: 63
End: 2023-01-19

## 2023-02-01 LAB
ALBUMIN SERPL-MCNC: 4 G/DL (ref 3.6–5.1)
ALBUMIN/GLOB SERPL: 1.4 (CALC) (ref 1–2.5)
ALP SERPL-CCNC: 82 U/L (ref 37–153)
ALT SERPL-CCNC: 19 U/L (ref 6–29)
AST SERPL-CCNC: 15 U/L (ref 10–35)
BASOPHILS # BLD AUTO: 62 CELLS/UL (ref 0–200)
BASOPHILS NFR BLD AUTO: 0.6 %
BILIRUB SERPL-MCNC: 0.5 MG/DL (ref 0.2–1.2)
BUN SERPL-MCNC: 10 MG/DL (ref 7–25)
BUN/CREAT SERPL: ABNORMAL (CALC) (ref 6–22)
CALCIUM SERPL-MCNC: 9.6 MG/DL (ref 8.6–10.4)
CHLORIDE SERPL-SCNC: 91 MMOL/L (ref 98–110)
CHOLEST SERPL-MCNC: 159 MG/DL
CHOLEST/HDLC SERPL: 2.5 (CALC)
CO2 SERPL-SCNC: 32 MMOL/L (ref 20–32)
CREAT SERPL-MCNC: 0.56 MG/DL (ref 0.5–1.05)
EGFR: 103 ML/MIN/1.73M2
EOSINOPHIL # BLD AUTO: 291 CELLS/UL (ref 15–500)
EOSINOPHIL NFR BLD AUTO: 2.8 %
ERYTHROCYTE [DISTWIDTH] IN BLOOD BY AUTOMATED COUNT: 13.5 % (ref 11–15)
GLOBULIN SER CALC-MCNC: 2.9 G/DL (CALC) (ref 1.9–3.7)
GLUCOSE SERPL-MCNC: 143 MG/DL (ref 65–99)
HBA1C MFR BLD: 7 % OF TOTAL HGB
HCT VFR BLD AUTO: 43.1 % (ref 35–45)
HDLC SERPL-MCNC: 63 MG/DL
HGB BLD-MCNC: 14.5 G/DL (ref 11.7–15.5)
LDLC SERPL CALC-MCNC: 72 MG/DL (CALC)
LYMPHOCYTES # BLD AUTO: 2184 CELLS/UL (ref 850–3900)
LYMPHOCYTES NFR BLD AUTO: 21 %
MCH RBC QN AUTO: 28.9 PG (ref 27–33)
MCHC RBC AUTO-ENTMCNC: 33.6 G/DL (ref 32–36)
MCV RBC AUTO: 85.9 FL (ref 80–100)
MONOCYTES # BLD AUTO: 614 CELLS/UL (ref 200–950)
MONOCYTES NFR BLD AUTO: 5.9 %
NEUTROPHILS # BLD AUTO: 7249 CELLS/UL (ref 1500–7800)
NEUTROPHILS NFR BLD AUTO: 69.7 %
NONHDLC SERPL-MCNC: 96 MG/DL (CALC)
PLATELET # BLD AUTO: 330 THOUSAND/UL (ref 140–400)
PMV BLD REES-ECKER: 9.1 FL (ref 7.5–12.5)
POTASSIUM SERPL-SCNC: 3.7 MMOL/L (ref 3.5–5.3)
PROT SERPL-MCNC: 6.9 G/DL (ref 6.1–8.1)
RBC # BLD AUTO: 5.02 MILLION/UL (ref 3.8–5.1)
SODIUM SERPL-SCNC: 134 MMOL/L (ref 135–146)
TRIGL SERPL-MCNC: 165 MG/DL
TSH SERPL-ACNC: 3.07 MIU/L (ref 0.4–4.5)
WBC # BLD AUTO: 10.4 THOUSAND/UL (ref 3.8–10.8)

## 2023-02-06 ENCOUNTER — OFFICE VISIT (OUTPATIENT)
Dept: FAMILY MEDICINE | Facility: CLINIC | Age: 63
End: 2023-02-06
Payer: COMMERCIAL

## 2023-02-06 ENCOUNTER — TELEPHONE (OUTPATIENT)
Dept: FAMILY MEDICINE | Facility: CLINIC | Age: 63
End: 2023-02-06

## 2023-02-06 VITALS
DIASTOLIC BLOOD PRESSURE: 70 MMHG | HEART RATE: 60 BPM | SYSTOLIC BLOOD PRESSURE: 132 MMHG | WEIGHT: 252 LBS | HEIGHT: 63 IN | BODY MASS INDEX: 44.65 KG/M2

## 2023-02-06 DIAGNOSIS — F43.21 GRIEF AT LOSS OF CHILD: ICD-10-CM

## 2023-02-06 DIAGNOSIS — G62.9 PERIPHERAL POLYNEUROPATHY: ICD-10-CM

## 2023-02-06 DIAGNOSIS — E11.9 TYPE 2 DIABETES MELLITUS WITHOUT COMPLICATION, WITHOUT LONG-TERM CURRENT USE OF INSULIN: Primary | ICD-10-CM

## 2023-02-06 DIAGNOSIS — F17.210 CIGARETTE NICOTINE DEPENDENCE WITHOUT COMPLICATION: ICD-10-CM

## 2023-02-06 DIAGNOSIS — I10 ESSENTIAL HYPERTENSION: ICD-10-CM

## 2023-02-06 DIAGNOSIS — Z63.4 GRIEF AT LOSS OF CHILD: ICD-10-CM

## 2023-02-06 PROCEDURE — 99214 OFFICE O/P EST MOD 30 MIN: CPT | Mod: S$GLB,,, | Performed by: PHYSICIAN ASSISTANT

## 2023-02-06 PROCEDURE — 99214 PR OFFICE/OUTPT VISIT, EST, LEVL IV, 30-39 MIN: ICD-10-PCS | Mod: S$GLB,,, | Performed by: PHYSICIAN ASSISTANT

## 2023-02-06 PROCEDURE — 3008F BODY MASS INDEX DOCD: CPT | Mod: CPTII,S$GLB,, | Performed by: PHYSICIAN ASSISTANT

## 2023-02-06 PROCEDURE — 3075F SYST BP GE 130 - 139MM HG: CPT | Mod: CPTII,S$GLB,, | Performed by: PHYSICIAN ASSISTANT

## 2023-02-06 PROCEDURE — 3008F PR BODY MASS INDEX (BMI) DOCUMENTED: ICD-10-PCS | Mod: CPTII,S$GLB,, | Performed by: PHYSICIAN ASSISTANT

## 2023-02-06 PROCEDURE — 3051F HG A1C>EQUAL 7.0%<8.0%: CPT | Mod: CPTII,S$GLB,, | Performed by: PHYSICIAN ASSISTANT

## 2023-02-06 PROCEDURE — 3075F PR MOST RECENT SYSTOLIC BLOOD PRESS GE 130-139MM HG: ICD-10-PCS | Mod: CPTII,S$GLB,, | Performed by: PHYSICIAN ASSISTANT

## 2023-02-06 PROCEDURE — 3078F PR MOST RECENT DIASTOLIC BLOOD PRESSURE < 80 MM HG: ICD-10-PCS | Mod: CPTII,S$GLB,, | Performed by: PHYSICIAN ASSISTANT

## 2023-02-06 PROCEDURE — 1159F PR MEDICATION LIST DOCUMENTED IN MEDICAL RECORD: ICD-10-PCS | Mod: CPTII,S$GLB,, | Performed by: PHYSICIAN ASSISTANT

## 2023-02-06 PROCEDURE — 3051F PR MOST RECENT HEMOGLOBIN A1C LEVEL 7.0 - < 8.0%: ICD-10-PCS | Mod: CPTII,S$GLB,, | Performed by: PHYSICIAN ASSISTANT

## 2023-02-06 PROCEDURE — 3078F DIAST BP <80 MM HG: CPT | Mod: CPTII,S$GLB,, | Performed by: PHYSICIAN ASSISTANT

## 2023-02-06 PROCEDURE — 4010F ACE/ARB THERAPY RXD/TAKEN: CPT | Mod: CPTII,S$GLB,, | Performed by: PHYSICIAN ASSISTANT

## 2023-02-06 PROCEDURE — 4010F PR ACE/ARB THEARPY RXD/TAKEN: ICD-10-PCS | Mod: CPTII,S$GLB,, | Performed by: PHYSICIAN ASSISTANT

## 2023-02-06 PROCEDURE — 1159F MED LIST DOCD IN RCRD: CPT | Mod: CPTII,S$GLB,, | Performed by: PHYSICIAN ASSISTANT

## 2023-02-06 RX ORDER — LOSARTAN POTASSIUM 50 MG/1
50 TABLET ORAL DAILY
Qty: 90 TABLET | Refills: 1 | Status: SHIPPED | OUTPATIENT
Start: 2023-02-06 | End: 2023-06-06 | Stop reason: SDUPTHER

## 2023-02-06 RX ORDER — POTASSIUM CHLORIDE 750 MG/1
CAPSULE, EXTENDED RELEASE ORAL
Qty: 270 CAPSULE | Refills: 1 | Status: SHIPPED | OUTPATIENT
Start: 2023-02-06 | End: 2023-06-06 | Stop reason: SDUPTHER

## 2023-02-06 RX ORDER — SIMVASTATIN 10 MG/1
10 TABLET, FILM COATED ORAL NIGHTLY
Qty: 90 TABLET | Refills: 1 | Status: SHIPPED | OUTPATIENT
Start: 2023-02-06 | End: 2023-06-06 | Stop reason: SDUPTHER

## 2023-02-06 RX ORDER — ATENOLOL AND CHLORTHALIDONE TABLET 50; 25 MG/1; MG/1
1 TABLET ORAL DAILY
Qty: 90 TABLET | Refills: 1 | Status: SHIPPED | OUTPATIENT
Start: 2023-02-06 | End: 2023-06-06 | Stop reason: SDUPTHER

## 2023-02-06 RX ORDER — GLIPIZIDE 5 MG/1
TABLET ORAL
Qty: 90 TABLET | Refills: 1 | Status: SHIPPED | OUTPATIENT
Start: 2023-02-06 | End: 2023-06-06 | Stop reason: SDUPTHER

## 2023-02-06 RX ORDER — GABAPENTIN 100 MG/1
CAPSULE ORAL
Qty: 90 CAPSULE | Refills: 1 | Status: SHIPPED | OUTPATIENT
Start: 2023-02-06 | End: 2023-02-06 | Stop reason: SDUPTHER

## 2023-02-06 RX ORDER — GABAPENTIN 100 MG/1
CAPSULE ORAL
Qty: 90 CAPSULE | Refills: 1 | Status: SHIPPED | OUTPATIENT
Start: 2023-02-06 | End: 2023-02-07 | Stop reason: SDUPTHER

## 2023-02-06 RX ORDER — TRAZODONE HYDROCHLORIDE 50 MG/1
50 TABLET ORAL NIGHTLY
Qty: 90 TABLET | Refills: 1 | Status: SHIPPED | OUTPATIENT
Start: 2023-02-06 | End: 2023-12-04 | Stop reason: SDUPTHER

## 2023-02-06 RX ORDER — METFORMIN HYDROCHLORIDE 750 MG/1
750 TABLET, EXTENDED RELEASE ORAL 2 TIMES DAILY
Qty: 180 TABLET | Refills: 1 | Status: SHIPPED | OUTPATIENT
Start: 2023-02-06 | End: 2023-06-06 | Stop reason: SDUPTHER

## 2023-02-06 SDOH — SOCIAL DETERMINANTS OF HEALTH (SDOH): DISSAPEARANCE AND DEATH OF FAMILY MEMBER: Z63.4

## 2023-02-06 NOTE — PROGRESS NOTES
SUBJECTIVE:    Patient ID: Angelina Jennings is a 62 y.o. female.    Chief Complaint: Diabetes (Brought bottles// SW)    This is a 62-year-old female who presents today for regular diabetic checkup. She reports that she has been doing pretty well overall. Aware that she has been putting on some weight. Up about 5 lbs. 2/2 to snacking and eating more since her kid and grandkids are staying with her while building a house.      Office Visit on 11/02/2022   Component Date Value Ref Range Status    Hemoglobin A1C, POC 11/02/2022 6.8  % Final    Glucose 01/31/2023 143 (H)  65 - 99 mg/dL Final    BUN 01/31/2023 10  7 - 25 mg/dL Final    Creatinine 01/31/2023 0.56  0.50 - 1.05 mg/dL Final    eGFR 01/31/2023 103  > OR = 60 mL/min/1.73m2 Final    BUN/Creatinine Ratio 01/31/2023 NOT APPLICABLE  6 - 22 (calc) Final    Sodium 01/31/2023 134 (L)  135 - 146 mmol/L Final    Potassium 01/31/2023 3.7  3.5 - 5.3 mmol/L Final    Chloride 01/31/2023 91 (L)  98 - 110 mmol/L Final    CO2 01/31/2023 32  20 - 32 mmol/L Final    Calcium 01/31/2023 9.6  8.6 - 10.4 mg/dL Final    Total Protein 01/31/2023 6.9  6.1 - 8.1 g/dL Final    Albumin 01/31/2023 4.0  3.6 - 5.1 g/dL Final    Globulin, Total 01/31/2023 2.9  1.9 - 3.7 g/dL (calc) Final    Albumin/Globulin Ratio 01/31/2023 1.4  1.0 - 2.5 (calc) Final    Total Bilirubin 01/31/2023 0.5  0.2 - 1.2 mg/dL Final    Alkaline Phosphatase 01/31/2023 82  37 - 153 U/L Final    AST 01/31/2023 15  10 - 35 U/L Final    ALT 01/31/2023 19  6 - 29 U/L Final    Hemoglobin A1C 01/31/2023 7.0 (H)  <5.7 % of total Hgb Final    Cholesterol 01/31/2023 159  <200 mg/dL Final    HDL 01/31/2023 63  > OR = 50 mg/dL Final    Triglycerides 01/31/2023 165 (H)  <150 mg/dL Final    LDL Cholesterol 01/31/2023 72  mg/dL (calc) Final    HDL/Cholesterol Ratio 01/31/2023 2.5  <5.0 (calc) Final    Non HDL Chol. (LDL+VLDL) 01/31/2023 96  <130 mg/dL (calc) Final    WBC 01/31/2023 10.4  3.8 - 10.8 Thousand/uL Final    RBC 01/31/2023  5.02  3.80 - 5.10 Million/uL Final    Hemoglobin 01/31/2023 14.5  11.7 - 15.5 g/dL Final    Hematocrit 01/31/2023 43.1  35.0 - 45.0 % Final    MCV 01/31/2023 85.9  80.0 - 100.0 fL Final    MCH 01/31/2023 28.9  27.0 - 33.0 pg Final    MCHC 01/31/2023 33.6  32.0 - 36.0 g/dL Final    RDW 01/31/2023 13.5  11.0 - 15.0 % Final    Platelets 01/31/2023 330  140 - 400 Thousand/uL Final    MPV 01/31/2023 9.1  7.5 - 12.5 fL Final    Neutrophils, Abs 01/31/2023 7,249  1,500 - 7,800 cells/uL Final    Lymph # 01/31/2023 2,184  850 - 3,900 cells/uL Final    Mono # 01/31/2023 614  200 - 950 cells/uL Final    Eos # 01/31/2023 291  15 - 500 cells/uL Final    Baso # 01/31/2023 62  0 - 200 cells/uL Final    Neutrophils Relative 01/31/2023 69.7  % Final    Lymph % 01/31/2023 21.0  % Final    Mono % 01/31/2023 5.9  % Final    Eosinophil % 01/31/2023 2.8  % Final    Basophil % 01/31/2023 0.6  % Final    TSH w/reflex to FT4 01/31/2023 3.07  0.40 - 4.50 mIU/L Final       Past Medical History:   Diagnosis Date    Diabetes mellitus, type 2     Hypertension      Past Surgical History:   Procedure Laterality Date    COLONOSCOPY  2022    Dr. Stacy[facility] 5 years    TONSILLECTOMY       Family History   Problem Relation Age of Onset    Early death Mother     Heart disease Father     Breast cancer Maternal Aunt 75    Cancer Maternal Aunt     Diabetes Maternal Grandfather     Cancer Paternal Grandmother     Heart disease Paternal Grandfather        Marital Status:   Alcohol History:  reports current alcohol use.  Tobacco History:  reports that she quit smoking about 9 years ago. Her smoking use included cigarettes. She has a 52.50 pack-year smoking history. She has never used smokeless tobacco.  Drug History:  reports no history of drug use.    Review of patient's allergies indicates:  No Known Allergies    Current Outpatient Medications:     ascorbic acid, vitamin C, (VITAMIN C) 500 MG tablet, Take 500 mg by mouth once daily., Disp: , Rfl:      aspirin 81 MG Chew, Take 81 mg by mouth once daily. , Disp: , Rfl:     b complex vitamins tablet, Take 1 tablet by mouth once daily., Disp: , Rfl:     blood sugar diagnostic (ONETOUCH ULTRA BLUE TEST STRIP) Strp, 30 strips by In Vitro route once daily. as directed, Disp: 30 strip, Rfl: 11    blood sugar diagnostic Strp, To check BG 1 time daily, to use with insurance preferred meter, Disp: 200 each, Rfl: 1    cholecalciferol, vitamin D3, (VITAMIN D3) 25 mcg (1,000 unit) capsule, Take 1,000 Units by mouth once daily., Disp: , Rfl:     cyanocobalamin, vitamin B-12, 2,500 mcg Tab, 2,500 mcg once daily. , Disp: , Rfl:     cyclobenzaprine (FLEXERIL) 5 MG tablet, Take 1 tablet (5 mg total) by mouth 3 (three) times daily as needed for Muscle spasms., Disp: 30 tablet, Rfl: 0    ONETOUCH DELICA PLUS LANCET 33 gauge Misc, Apply topically once daily., Disp: , Rfl:     ONETOUCH ULTRA2 METER Misc, USE TO CHECK GLUCOSE ONCE DAILY, Disp: , Rfl:     semaglutide (OZEMPIC) 1 mg/dose (4 mg/3 mL), Inject 1 mg into the skin every 7 days., Disp: 1 pen, Rfl: 5    triamcinolone acetonide 0.1% (KENALOG) 0.1 % cream, Apply topically 2 (two) times daily. To ear eczema, Disp: 30 g, Rfl: 1    zinc gluconate 50 mg tablet, Take 50 mg by mouth once daily., Disp: , Rfl:     atenoloL-chlorthalidone (TENORETIC) 50-25 mg Tab, Take 1 tablet by mouth once daily., Disp: 90 tablet, Rfl: 1    gabapentin (NEURONTIN) 100 MG capsule, TAKE 1 CAPSULE BY MOUTH IN THE EVENING Strength: 100 mg, Disp: 90 capsule, Rfl: 1    glipiZIDE (GLUCOTROL) 5 MG tablet, Take 1/2 tablet or 1 tablet every morning, Disp: 90 tablet, Rfl: 1    losartan (COZAAR) 50 MG tablet, Take 1 tablet (50 mg total) by mouth once daily., Disp: 90 tablet, Rfl: 1    metFORMIN (GLUCOPHAGE-XR) 750 MG ER 24hr tablet, Take 1 tablet (750 mg total) by mouth 2 (two) times daily., Disp: 180 tablet, Rfl: 1    potassium chloride (MICRO-K) 10 MEQ CpSR, Take 3 capsules by mouth once daily for 180 doses, Disp:  "270 capsule, Rfl: 1    simvastatin (ZOCOR) 10 MG tablet, Take 1 tablet (10 mg total) by mouth every evening., Disp: 90 tablet, Rfl: 1    traZODone (DESYREL) 50 MG tablet, Take 1 tablet (50 mg total) by mouth every evening., Disp: 90 tablet, Rfl: 1    Review of Systems   Constitutional:  Negative for activity change, appetite change, fatigue and fever.   Eyes:  Negative for visual disturbance.   Respiratory:  Negative for cough, shortness of breath and wheezing.    Cardiovascular:  Negative for chest pain, palpitations and leg swelling.   Gastrointestinal:  Negative for abdominal pain, constipation, diarrhea, nausea and vomiting.   Genitourinary:  Negative for decreased urine volume, difficulty urinating, dysuria, frequency and urgency.   Musculoskeletal:  Negative for arthralgias and myalgias.   Neurological:  Negative for dizziness, syncope, light-headedness and numbness.   Psychiatric/Behavioral:  Negative for behavioral problems.         Objective:      Vitals:    02/06/23 1451   BP: 132/70   Pulse: 60   Weight: 114.3 kg (252 lb)   Height: 5' 2.5" (1.588 m)     Physical Exam  Vitals and nursing note reviewed.   Constitutional:       General: She is not in acute distress.     Appearance: Normal appearance. She is obese. She is not ill-appearing or toxic-appearing.      Comments: Morbidly obese WF sitting erect in an office chair in no acute distress.   HENT:      Head: Normocephalic and atraumatic.      Right Ear: External ear normal.      Left Ear: External ear normal.      Nose: Nose normal. No rhinorrhea.      Mouth/Throat:      Mouth: Mucous membranes are moist.      Pharynx: Oropharynx is clear.   Eyes:      General: No scleral icterus.     Extraocular Movements: Extraocular movements intact.      Conjunctiva/sclera: Conjunctivae normal.   Neck:      Vascular: No carotid bruit.   Cardiovascular:      Rate and Rhythm: Normal rate and regular rhythm.      Pulses: Normal pulses.      Heart sounds: Normal heart " sounds. No murmur heard.    No friction rub.   Pulmonary:      Effort: Pulmonary effort is normal.      Breath sounds: Normal breath sounds. No wheezing, rhonchi or rales.      Comments: Patient breathing comfortably on room air, with no use of respiratory accessory muscles noted on inspection.  Adequate breath sounds appreciated on auscultation in the diffuse bilateral lung fields.  Abdominal:      General: There is no distension.      Palpations: Abdomen is soft.      Tenderness: There is no abdominal tenderness. There is no guarding or rebound.   Musculoskeletal:      Cervical back: Normal range of motion.      Right lower leg: No edema.      Left lower leg: No edema.   Lymphadenopathy:      Cervical: No cervical adenopathy.   Skin:     General: Skin is warm and dry.      Coloration: Skin is not pale.   Neurological:      General: No focal deficit present.      Mental Status: She is alert. Mental status is at baseline.      Cranial Nerves: No cranial nerve deficit.      Motor: No weakness.      Coordination: Coordination normal.      Gait: Gait normal.   Psychiatric:         Mood and Affect: Mood normal.         Behavior: Behavior normal. Behavior is cooperative.         Thought Content: Thought content normal.         Judgment: Judgment normal.         Assessment:       1. Type 2 diabetes mellitus without complication, without long-term current use of insulin    2. Essential hypertension    3. Peripheral polyneuropathy    4. Grief at loss of child    5. Cigarette nicotine dependence without complication           Plan:       Type 2 diabetes mellitus without complication, without long-term current use of insulin  Comments:  A1c 7.0% and slightly higher. she does plan to really watch the diet and make the necessary changes. F/U 4 months or sooner if needed.  Orders:  -     metFORMIN (GLUCOPHAGE-XR) 750 MG ER 24hr tablet; Take 1 tablet (750 mg total) by mouth 2 (two) times daily.  Dispense: 180 tablet; Refill: 1  -      glipiZIDE (GLUCOTROL) 5 MG tablet; Take 1/2 tablet or 1 tablet every morning  Dispense: 90 tablet; Refill: 1  -     simvastatin (ZOCOR) 10 MG tablet; Take 1 tablet (10 mg total) by mouth every evening.  Dispense: 90 tablet; Refill: 1    Essential hypertension  Comments:  BP is at goal. continue as is.  Orders:  -     potassium chloride (MICRO-K) 10 MEQ CpSR; Take 3 capsules by mouth once daily for 180 doses  Dispense: 270 capsule; Refill: 1  -     losartan (COZAAR) 50 MG tablet; Take 1 tablet (50 mg total) by mouth once daily.  Dispense: 90 tablet; Refill: 1  -     atenoloL-chlorthalidone (TENORETIC) 50-25 mg Tab; Take 1 tablet by mouth once daily.  Dispense: 90 tablet; Refill: 1    Peripheral polyneuropathy  -     gabapentin (NEURONTIN) 100 MG capsule; TAKE 1 CAPSULE BY MOUTH IN THE EVENING  Strength: 100 mg  Dispense: 90 capsule; Refill: 1    Grief at loss of child  -     traZODone (DESYREL) 50 MG tablet; Take 1 tablet (50 mg total) by mouth every evening.  Dispense: 90 tablet; Refill: 1    Cigarette nicotine dependence without complication  Comments:  check CT low dose for baseline screening  Orders:  -     CT Chest Lung Screening Low Dose; Future; Expected date: 02/06/2023      Follow up in about 4 months (around 6/6/2023).        2/6/2023 Mendoza Hardy PA-C

## 2023-02-07 DIAGNOSIS — G62.9 PERIPHERAL POLYNEUROPATHY: ICD-10-CM

## 2023-02-07 RX ORDER — GABAPENTIN 100 MG/1
CAPSULE ORAL
Qty: 90 CAPSULE | Refills: 1 | Status: SHIPPED | OUTPATIENT
Start: 2023-02-07 | End: 2023-06-06 | Stop reason: SDUPTHER

## 2023-02-17 ENCOUNTER — HOSPITAL ENCOUNTER (OUTPATIENT)
Dept: RADIOLOGY | Facility: HOSPITAL | Age: 63
Discharge: HOME | End: 2023-02-17
Attending: PHYSICIAN ASSISTANT
Payer: COMMERCIAL

## 2023-02-17 DIAGNOSIS — F17.210 CIGARETTE NICOTINE DEPENDENCE WITHOUT COMPLICATION: ICD-10-CM

## 2023-02-17 PROCEDURE — 71271 CT THORAX LUNG CANCER SCR C-: CPT | Mod: TC,PO

## 2023-05-26 ENCOUNTER — TELEPHONE (OUTPATIENT)
Dept: FAMILY MEDICINE | Facility: CLINIC | Age: 63
End: 2023-05-26

## 2023-05-26 DIAGNOSIS — E11.9 TYPE 2 DIABETES MELLITUS WITHOUT COMPLICATION, WITHOUT LONG-TERM CURRENT USE OF INSULIN: Primary | ICD-10-CM

## 2023-05-26 DIAGNOSIS — E78.1 HYPERTRIGLYCERIDEMIA: ICD-10-CM

## 2023-05-26 DIAGNOSIS — Z79.899 ENCOUNTER FOR LONG-TERM (CURRENT) USE OF OTHER MEDICATIONS: ICD-10-CM

## 2023-06-02 LAB
ALBUMIN SERPL-MCNC: 4 G/DL (ref 3.6–5.1)
ALBUMIN/GLOB SERPL: 1.5 (CALC) (ref 1–2.5)
ALP SERPL-CCNC: 81 U/L (ref 37–153)
ALT SERPL-CCNC: 25 U/L (ref 6–29)
AST SERPL-CCNC: 16 U/L (ref 10–35)
BILIRUB SERPL-MCNC: 0.4 MG/DL (ref 0.2–1.2)
BUN SERPL-MCNC: 12 MG/DL (ref 7–25)
BUN/CREAT SERPL: 27 (CALC) (ref 6–22)
CALCIUM SERPL-MCNC: 9.4 MG/DL (ref 8.6–10.4)
CHLORIDE SERPL-SCNC: 95 MMOL/L (ref 98–110)
CHOLEST SERPL-MCNC: 164 MG/DL
CHOLEST/HDLC SERPL: 2.7 (CALC)
CO2 SERPL-SCNC: 29 MMOL/L (ref 20–32)
CREAT SERPL-MCNC: 0.45 MG/DL (ref 0.5–1.05)
EGFR: 109 ML/MIN/1.73M2
GLOBULIN SER CALC-MCNC: 2.6 G/DL (CALC) (ref 1.9–3.7)
GLUCOSE SERPL-MCNC: 163 MG/DL (ref 65–99)
HBA1C MFR BLD: 7 % OF TOTAL HGB
HDLC SERPL-MCNC: 60 MG/DL
LDLC SERPL CALC-MCNC: 80 MG/DL (CALC)
NONHDLC SERPL-MCNC: 104 MG/DL (CALC)
POTASSIUM SERPL-SCNC: 3.6 MMOL/L (ref 3.5–5.3)
PROT SERPL-MCNC: 6.6 G/DL (ref 6.1–8.1)
SODIUM SERPL-SCNC: 135 MMOL/L (ref 135–146)
TRIGL SERPL-MCNC: 144 MG/DL

## 2023-06-06 ENCOUNTER — OFFICE VISIT (OUTPATIENT)
Dept: FAMILY MEDICINE | Facility: CLINIC | Age: 63
End: 2023-06-06
Payer: COMMERCIAL

## 2023-06-06 VITALS
HEART RATE: 68 BPM | BODY MASS INDEX: 44.12 KG/M2 | SYSTOLIC BLOOD PRESSURE: 128 MMHG | WEIGHT: 249 LBS | DIASTOLIC BLOOD PRESSURE: 72 MMHG | HEIGHT: 63 IN

## 2023-06-06 DIAGNOSIS — G62.9 PERIPHERAL POLYNEUROPATHY: ICD-10-CM

## 2023-06-06 DIAGNOSIS — I10 ESSENTIAL HYPERTENSION: ICD-10-CM

## 2023-06-06 DIAGNOSIS — E11.9 TYPE 2 DIABETES MELLITUS WITHOUT COMPLICATION, WITHOUT LONG-TERM CURRENT USE OF INSULIN: Primary | ICD-10-CM

## 2023-06-06 PROCEDURE — 3051F HG A1C>EQUAL 7.0%<8.0%: CPT | Mod: CPTII,S$GLB,, | Performed by: PHYSICIAN ASSISTANT

## 2023-06-06 PROCEDURE — 3074F PR MOST RECENT SYSTOLIC BLOOD PRESSURE < 130 MM HG: ICD-10-PCS | Mod: CPTII,S$GLB,, | Performed by: PHYSICIAN ASSISTANT

## 2023-06-06 PROCEDURE — 4010F PR ACE/ARB THEARPY RXD/TAKEN: ICD-10-PCS | Mod: CPTII,S$GLB,, | Performed by: PHYSICIAN ASSISTANT

## 2023-06-06 PROCEDURE — 3078F PR MOST RECENT DIASTOLIC BLOOD PRESSURE < 80 MM HG: ICD-10-PCS | Mod: CPTII,S$GLB,, | Performed by: PHYSICIAN ASSISTANT

## 2023-06-06 PROCEDURE — 3008F PR BODY MASS INDEX (BMI) DOCUMENTED: ICD-10-PCS | Mod: CPTII,S$GLB,, | Performed by: PHYSICIAN ASSISTANT

## 2023-06-06 PROCEDURE — 3008F BODY MASS INDEX DOCD: CPT | Mod: CPTII,S$GLB,, | Performed by: PHYSICIAN ASSISTANT

## 2023-06-06 PROCEDURE — 3074F SYST BP LT 130 MM HG: CPT | Mod: CPTII,S$GLB,, | Performed by: PHYSICIAN ASSISTANT

## 2023-06-06 PROCEDURE — 4010F ACE/ARB THERAPY RXD/TAKEN: CPT | Mod: CPTII,S$GLB,, | Performed by: PHYSICIAN ASSISTANT

## 2023-06-06 PROCEDURE — 99214 OFFICE O/P EST MOD 30 MIN: CPT | Mod: S$GLB,,, | Performed by: PHYSICIAN ASSISTANT

## 2023-06-06 PROCEDURE — 1159F MED LIST DOCD IN RCRD: CPT | Mod: CPTII,S$GLB,, | Performed by: PHYSICIAN ASSISTANT

## 2023-06-06 PROCEDURE — 3078F DIAST BP <80 MM HG: CPT | Mod: CPTII,S$GLB,, | Performed by: PHYSICIAN ASSISTANT

## 2023-06-06 PROCEDURE — 3051F PR MOST RECENT HEMOGLOBIN A1C LEVEL 7.0 - < 8.0%: ICD-10-PCS | Mod: CPTII,S$GLB,, | Performed by: PHYSICIAN ASSISTANT

## 2023-06-06 PROCEDURE — 1159F PR MEDICATION LIST DOCUMENTED IN MEDICAL RECORD: ICD-10-PCS | Mod: CPTII,S$GLB,, | Performed by: PHYSICIAN ASSISTANT

## 2023-06-06 PROCEDURE — 99214 PR OFFICE/OUTPT VISIT, EST, LEVL IV, 30-39 MIN: ICD-10-PCS | Mod: S$GLB,,, | Performed by: PHYSICIAN ASSISTANT

## 2023-06-06 RX ORDER — GABAPENTIN 100 MG/1
CAPSULE ORAL
Qty: 90 CAPSULE | Refills: 1 | Status: SHIPPED | OUTPATIENT
Start: 2023-06-06 | End: 2023-12-18 | Stop reason: SDUPTHER

## 2023-06-06 RX ORDER — METFORMIN HYDROCHLORIDE 750 MG/1
750 TABLET, EXTENDED RELEASE ORAL 2 TIMES DAILY
Qty: 180 TABLET | Refills: 1 | Status: SHIPPED | OUTPATIENT
Start: 2023-06-06 | End: 2023-12-18 | Stop reason: SDUPTHER

## 2023-06-06 RX ORDER — GLIPIZIDE 5 MG/1
TABLET ORAL
Qty: 90 TABLET | Refills: 1 | Status: SHIPPED | OUTPATIENT
Start: 2023-06-06 | End: 2023-12-18 | Stop reason: SDUPTHER

## 2023-06-06 RX ORDER — LOSARTAN POTASSIUM 50 MG/1
50 TABLET ORAL DAILY
Qty: 90 TABLET | Refills: 1 | Status: SHIPPED | OUTPATIENT
Start: 2023-06-06 | End: 2023-12-18 | Stop reason: SDUPTHER

## 2023-06-06 RX ORDER — SIMVASTATIN 10 MG/1
10 TABLET, FILM COATED ORAL NIGHTLY
Qty: 90 TABLET | Refills: 1 | Status: SHIPPED | OUTPATIENT
Start: 2023-06-06 | End: 2023-12-18 | Stop reason: SDUPTHER

## 2023-06-06 RX ORDER — ATENOLOL AND CHLORTHALIDONE TABLET 50; 25 MG/1; MG/1
1 TABLET ORAL DAILY
Qty: 90 TABLET | Refills: 1 | Status: SHIPPED | OUTPATIENT
Start: 2023-06-06 | End: 2023-12-18 | Stop reason: SDUPTHER

## 2023-06-06 RX ORDER — POTASSIUM CHLORIDE 750 MG/1
CAPSULE, EXTENDED RELEASE ORAL
Qty: 270 CAPSULE | Refills: 1 | Status: SHIPPED | OUTPATIENT
Start: 2023-06-06 | End: 2023-12-18 | Stop reason: SDUPTHER

## 2023-06-06 NOTE — PROGRESS NOTES
SUBJECTIVE:    Patient ID: Angelina Jennings is a 62 y.o. female.    Chief Complaint: Diabetes (4mth, brought bottles, Foot exam set up// SW)    This is a 62-year-old female who presents today for regular diabetic checkup.  Recent labs obtained and all look stable at this time.  A1c at 7% and right at goal. Reports that she has had some stress with family living with her now. Labs looking good. DFE completed today in clinic.      Telephone on 05/26/2023   Component Date Value Ref Range Status    Glucose 06/01/2023 163 (H)  65 - 99 mg/dL Final    BUN 06/01/2023 12  7 - 25 mg/dL Final    Creatinine 06/01/2023 0.45 (L)  0.50 - 1.05 mg/dL Final    eGFR 06/01/2023 109  > OR = 60 mL/min/1.73m2 Final    BUN/Creatinine Ratio 06/01/2023 27 (H)  6 - 22 (calc) Final    Sodium 06/01/2023 135  135 - 146 mmol/L Final    Potassium 06/01/2023 3.6  3.5 - 5.3 mmol/L Final    Chloride 06/01/2023 95 (L)  98 - 110 mmol/L Final    CO2 06/01/2023 29  20 - 32 mmol/L Final    Calcium 06/01/2023 9.4  8.6 - 10.4 mg/dL Final    Total Protein 06/01/2023 6.6  6.1 - 8.1 g/dL Final    Albumin 06/01/2023 4.0  3.6 - 5.1 g/dL Final    Globulin, Total 06/01/2023 2.6  1.9 - 3.7 g/dL (calc) Final    Albumin/Globulin Ratio 06/01/2023 1.5  1.0 - 2.5 (calc) Final    Total Bilirubin 06/01/2023 0.4  0.2 - 1.2 mg/dL Final    Alkaline Phosphatase 06/01/2023 81  37 - 153 U/L Final    AST 06/01/2023 16  10 - 35 U/L Final    ALT 06/01/2023 25  6 - 29 U/L Final    Cholesterol 06/01/2023 164  <200 mg/dL Final    HDL 06/01/2023 60  > OR = 50 mg/dL Final    Triglycerides 06/01/2023 144  <150 mg/dL Final    LDL Cholesterol 06/01/2023 80  mg/dL (calc) Final    HDL/Cholesterol Ratio 06/01/2023 2.7  <5.0 (calc) Final    Non HDL Chol. (LDL+VLDL) 06/01/2023 104  <130 mg/dL (calc) Final    Hemoglobin A1C 06/01/2023 7.0 (H)  <5.7 % of total Hgb Final       Past Medical History:   Diagnosis Date    Diabetes mellitus, type 2     Hypertension      Past Surgical History:   Procedure  Laterality Date    COLONOSCOPY  2022    Dr. Stacy-RTC 5 years    TONSILLECTOMY       Family History   Problem Relation Age of Onset    Early death Mother     Heart disease Father     Breast cancer Maternal Aunt 75    Cancer Maternal Aunt     Diabetes Maternal Grandfather     Cancer Paternal Grandmother     Heart disease Paternal Grandfather        Marital Status:   Alcohol History:  reports current alcohol use.  Tobacco History:  reports that she quit smoking about 9 years ago. Her smoking use included cigarettes. She has a 52.50 pack-year smoking history. She has been exposed to tobacco smoke. She has never used smokeless tobacco.  Drug History:  reports no history of drug use.    Review of patient's allergies indicates:  No Known Allergies    Current Outpatient Medications:     ascorbic acid, vitamin C, (VITAMIN C) 500 MG tablet, Take 500 mg by mouth once daily., Disp: , Rfl:     aspirin 81 MG Chew, Take 81 mg by mouth once daily. , Disp: , Rfl:     b complex vitamins tablet, Take 1 tablet by mouth once daily., Disp: , Rfl:     blood sugar diagnostic (ONETOUCH ULTRA BLUE TEST STRIP) Strp, 30 strips by In Vitro route once daily. as directed, Disp: 30 strip, Rfl: 11    blood sugar diagnostic Strp, To check BG 1 time daily, to use with insurance preferred meter, Disp: 200 each, Rfl: 1    cholecalciferol, vitamin D3, (VITAMIN D3) 25 mcg (1,000 unit) capsule, Take 1,000 Units by mouth once daily., Disp: , Rfl:     cyanocobalamin, vitamin B-12, 2,500 mcg Tab, 2,500 mcg once daily. , Disp: , Rfl:     cyclobenzaprine (FLEXERIL) 5 MG tablet, Take 1 tablet (5 mg total) by mouth 3 (three) times daily as needed for Muscle spasms., Disp: 30 tablet, Rfl: 0    ONETOUCH DELICA PLUS LANCET 33 gauge Misc, Apply topically once daily., Disp: , Rfl:     ONETOUCH ULTRA2 METER Misc, USE TO CHECK GLUCOSE ONCE DAILY, Disp: , Rfl:     semaglutide (OZEMPIC) 1 mg/dose (4 mg/3 mL), Inject 1 mg into the skin every 7 days., Disp: 1 pen,  Rfl: 5    traZODone (DESYREL) 50 MG tablet, Take 1 tablet (50 mg total) by mouth every evening., Disp: 90 tablet, Rfl: 1    triamcinolone acetonide 0.1% (KENALOG) 0.1 % cream, Apply topically 2 (two) times daily. To ear eczema, Disp: 30 g, Rfl: 1    zinc gluconate 50 mg tablet, Take 50 mg by mouth once daily., Disp: , Rfl:     atenoloL-chlorthalidone (TENORETIC) 50-25 mg Tab, Take 1 tablet by mouth once daily., Disp: 90 tablet, Rfl: 1    gabapentin (NEURONTIN) 100 MG capsule, TAKE 1 CAPSULE BY MOUTH IN THE EVENING, Disp: 90 capsule, Rfl: 1    glipiZIDE (GLUCOTROL) 5 MG tablet, Take 1/2 tablet or 1 tablet every morning, Disp: 90 tablet, Rfl: 1    losartan (COZAAR) 50 MG tablet, Take 1 tablet (50 mg total) by mouth once daily., Disp: 90 tablet, Rfl: 1    metFORMIN (GLUCOPHAGE-XR) 750 MG ER 24hr tablet, Take 1 tablet (750 mg total) by mouth 2 (two) times daily., Disp: 180 tablet, Rfl: 1    potassium chloride (MICRO-K) 10 MEQ CpSR, Take 3 capsules by mouth once daily for 180 doses, Disp: 270 capsule, Rfl: 1    simvastatin (ZOCOR) 10 MG tablet, Take 1 tablet (10 mg total) by mouth every evening., Disp: 90 tablet, Rfl: 1    Review of Systems   Constitutional:  Negative for activity change, appetite change, fatigue and fever.   Eyes:  Negative for visual disturbance.   Respiratory:  Negative for cough, shortness of breath and wheezing.    Cardiovascular:  Negative for chest pain, palpitations and leg swelling.   Gastrointestinal:  Negative for abdominal pain, constipation, diarrhea, nausea and vomiting.   Genitourinary:  Negative for decreased urine volume, difficulty urinating, dysuria, frequency and urgency.   Musculoskeletal:  Negative for arthralgias and myalgias.   Neurological:  Negative for dizziness, syncope, light-headedness and numbness.   Psychiatric/Behavioral:  Negative for behavioral problems.         Objective:      Vitals:    06/06/23 0930   BP: 128/72   Pulse: 68   Weight: 112.9 kg (249 lb)   Height: 5'  "2.5" (1.588 m)     Physical Exam  Vitals and nursing note reviewed.   Constitutional:       General: She is not in acute distress.     Appearance: Normal appearance. She is obese. She is not ill-appearing or toxic-appearing.      Comments: Morbidly obese WF sitting erect in an office chair in no acute distress.   HENT:      Head: Normocephalic and atraumatic.      Right Ear: External ear normal.      Left Ear: External ear normal.      Nose: Nose normal. No rhinorrhea.      Mouth/Throat:      Mouth: Mucous membranes are moist.      Pharynx: Oropharynx is clear.   Eyes:      General: No scleral icterus.     Extraocular Movements: Extraocular movements intact.      Conjunctiva/sclera: Conjunctivae normal.   Neck:      Vascular: No carotid bruit.   Cardiovascular:      Rate and Rhythm: Normal rate and regular rhythm.      Pulses: Normal pulses.           Dorsalis pedis pulses are 2+ on the right side and 2+ on the left side.        Posterior tibial pulses are 2+ on the right side and 2+ on the left side.      Heart sounds: Normal heart sounds. No murmur heard.    No friction rub.   Pulmonary:      Effort: Pulmonary effort is normal.      Breath sounds: Normal breath sounds. No wheezing, rhonchi or rales.      Comments: Patient breathing comfortably on room air, with no use of respiratory accessory muscles noted on inspection.  Adequate breath sounds appreciated on auscultation in the diffuse bilateral lung fields.  Abdominal:      General: There is no distension.      Palpations: Abdomen is soft.      Tenderness: There is no abdominal tenderness. There is no guarding or rebound.   Musculoskeletal:      Cervical back: Normal range of motion.      Right lower leg: No edema.      Left lower leg: No edema.      Right foot: Normal range of motion. No deformity.      Left foot: Normal range of motion. No deformity.   Feet:      Right foot:      Protective Sensation: 2 sites tested.  2 sites sensed.      Skin integrity: No " ulcer or blister.      Left foot:      Protective Sensation: 2 sites tested.  2 sites sensed.      Skin integrity: No ulcer or blister.   Lymphadenopathy:      Cervical: No cervical adenopathy.   Skin:     General: Skin is warm and dry.      Coloration: Skin is not pale.   Neurological:      General: No focal deficit present.      Mental Status: She is alert. Mental status is at baseline.      Cranial Nerves: No cranial nerve deficit.      Motor: No weakness.      Coordination: Coordination normal.      Gait: Gait normal.   Psychiatric:         Mood and Affect: Mood normal.         Behavior: Behavior normal. Behavior is cooperative.         Thought Content: Thought content normal.         Judgment: Judgment normal.         Assessment:       1. Type 2 diabetes mellitus without complication, without long-term current use of insulin    2. Essential hypertension    3. Peripheral polyneuropathy         Plan:       Type 2 diabetes mellitus without complication, without long-term current use of insulin  Comments:  A1c at 7% today and right at goal. DFE completed. Will consider weight watchers diabetic plan.  Orders:  -     Foot Exam Performed  -     simvastatin (ZOCOR) 10 MG tablet; Take 1 tablet (10 mg total) by mouth every evening.  Dispense: 90 tablet; Refill: 1  -     glipiZIDE (GLUCOTROL) 5 MG tablet; Take 1/2 tablet or 1 tablet every morning  Dispense: 90 tablet; Refill: 1  -     metFORMIN (GLUCOPHAGE-XR) 750 MG ER 24hr tablet; Take 1 tablet (750 mg total) by mouth 2 (two) times daily.  Dispense: 180 tablet; Refill: 1    Essential hypertension  Comments:  BP is at goal. continue as is.  Orders:  -     losartan (COZAAR) 50 MG tablet; Take 1 tablet (50 mg total) by mouth once daily.  Dispense: 90 tablet; Refill: 1  -     atenoloL-chlorthalidone (TENORETIC) 50-25 mg Tab; Take 1 tablet by mouth once daily.  Dispense: 90 tablet; Refill: 1  -     potassium chloride (MICRO-K) 10 MEQ CpSR; Take 3 capsules by mouth once daily  for 180 doses  Dispense: 270 capsule; Refill: 1    Peripheral polyneuropathy  Comments:  she is on gabapentin with decent effect. will consider increasing dose if needed.  Orders:  -     gabapentin (NEURONTIN) 100 MG capsule; TAKE 1 CAPSULE BY MOUTH IN THE EVENING  Dispense: 90 capsule; Refill: 1      Follow up in about 6 months (around 12/6/2023) for Diabetic Check-Up.        6/6/2023 Mendoza Hardy PA-C

## 2023-07-21 ENCOUNTER — TELEPHONE (OUTPATIENT)
Dept: FAMILY MEDICINE | Facility: CLINIC | Age: 63
End: 2023-07-21

## 2023-07-21 DIAGNOSIS — E11.9 TYPE 2 DIABETES MELLITUS WITHOUT COMPLICATION, WITHOUT LONG-TERM CURRENT USE OF INSULIN: ICD-10-CM

## 2023-07-21 NOTE — TELEPHONE ENCOUNTER
----- Message from Britt Cast sent at 7/21/2023 10:16 AM CDT -----  Refill Ozempic. She called [facility] on [address] said she does  not have a prescription with refills. Pt's # [phone]

## 2023-07-21 NOTE — TELEPHONE ENCOUNTER
----- Message from Britt Cast sent at 7/21/2023 10:16 AM CDT -----  Refill Ozempic. She called [facility] on [address] said she does  not have a prescription with refills. Pt's # [phone]

## 2023-10-11 DIAGNOSIS — Z12.31 OTHER SCREENING MAMMOGRAM: ICD-10-CM

## 2023-10-16 ENCOUNTER — PATIENT MESSAGE (OUTPATIENT)
Dept: ADMINISTRATIVE | Facility: HOSPITAL | Age: 63
End: 2023-10-16
Payer: COMMERCIAL

## 2023-10-20 ENCOUNTER — HOSPITAL ENCOUNTER (OUTPATIENT)
Dept: RADIOLOGY | Facility: HOSPITAL | Age: 63
Discharge: HOME | End: 2023-10-20
Attending: PHYSICIAN ASSISTANT
Payer: COMMERCIAL

## 2023-10-20 DIAGNOSIS — Z12.31 OTHER SCREENING MAMMOGRAM: ICD-10-CM

## 2023-10-20 PROCEDURE — 77063 BREAST TOMOSYNTHESIS BI: CPT | Mod: 26,,, | Performed by: RADIOLOGY

## 2023-10-20 PROCEDURE — 77067 SCR MAMMO BI INCL CAD: CPT | Mod: TC

## 2023-10-20 PROCEDURE — 77067 MAMMO DIGITAL SCREENING BILAT WITH TOMO: ICD-10-PCS | Mod: 26,,, | Performed by: RADIOLOGY

## 2023-10-20 PROCEDURE — 77067 SCR MAMMO BI INCL CAD: CPT | Mod: 26,,, | Performed by: RADIOLOGY

## 2023-10-20 PROCEDURE — 77063 MAMMO DIGITAL SCREENING BILAT WITH TOMO: ICD-10-PCS | Mod: 26,,, | Performed by: RADIOLOGY

## 2023-11-29 ENCOUNTER — TELEPHONE (OUTPATIENT)
Dept: FAMILY MEDICINE | Facility: CLINIC | Age: 63
End: 2023-11-29

## 2023-11-29 DIAGNOSIS — Z79.899 ENCOUNTER FOR LONG-TERM (CURRENT) USE OF OTHER MEDICATIONS: ICD-10-CM

## 2023-11-29 DIAGNOSIS — E78.1 HYPERTRIGLYCERIDEMIA: ICD-10-CM

## 2023-11-29 DIAGNOSIS — Z00.00 ROUTINE GENERAL MEDICAL EXAMINATION AT A HEALTH CARE FACILITY: ICD-10-CM

## 2023-11-29 DIAGNOSIS — I10 ESSENTIAL HYPERTENSION: ICD-10-CM

## 2023-11-29 DIAGNOSIS — E11.9 TYPE 2 DIABETES MELLITUS WITHOUT COMPLICATION, WITHOUT LONG-TERM CURRENT USE OF INSULIN: Primary | ICD-10-CM

## 2023-12-04 DIAGNOSIS — F43.21 GRIEF AT LOSS OF CHILD: ICD-10-CM

## 2023-12-04 DIAGNOSIS — Z63.4 GRIEF AT LOSS OF CHILD: ICD-10-CM

## 2023-12-04 RX ORDER — TRAZODONE HYDROCHLORIDE 50 MG/1
50 TABLET ORAL NIGHTLY
Qty: 90 TABLET | Refills: 1 | Status: SHIPPED | OUTPATIENT
Start: 2023-12-04 | End: 2023-12-18 | Stop reason: SDUPTHER

## 2023-12-04 SDOH — SOCIAL DETERMINANTS OF HEALTH (SDOH): DISSAPEARANCE AND DEATH OF FAMILY MEMBER: Z63.4

## 2023-12-16 LAB
ALBUMIN SERPL-MCNC: 4.4 G/DL (ref 3.6–5.1)
ALBUMIN/CREAT UR: 59 MCG/MG CREAT
ALBUMIN/GLOB SERPL: 1.7 (CALC) (ref 1–2.5)
ALP SERPL-CCNC: 81 U/L (ref 37–153)
ALT SERPL-CCNC: 24 U/L (ref 6–29)
APPEARANCE UR: CLEAR
AST SERPL-CCNC: 19 U/L (ref 10–35)
BACTERIA #/AREA URNS HPF: ABNORMAL /HPF
BACTERIA UR CULT: ABNORMAL
BACTERIA UR CULT: ABNORMAL
BILIRUB SERPL-MCNC: 0.5 MG/DL (ref 0.2–1.2)
BILIRUB UR QL STRIP: NEGATIVE
BUN SERPL-MCNC: 10 MG/DL (ref 7–25)
BUN/CREAT SERPL: ABNORMAL (CALC) (ref 6–22)
CALCIUM SERPL-MCNC: 10.4 MG/DL (ref 8.6–10.4)
CHLORIDE SERPL-SCNC: 92 MMOL/L (ref 98–110)
CHOLEST SERPL-MCNC: 173 MG/DL
CHOLEST/HDLC SERPL: 2.9 (CALC)
CO2 SERPL-SCNC: 28 MMOL/L (ref 20–32)
COLOR UR: YELLOW
CREAT SERPL-MCNC: 0.56 MG/DL (ref 0.5–1.05)
CREAT UR-MCNC: 22 MG/DL (ref 20–275)
EGFR: 102 ML/MIN/1.73M2
GLOBULIN SER CALC-MCNC: 2.6 G/DL (CALC) (ref 1.9–3.7)
GLUCOSE SERPL-MCNC: 141 MG/DL (ref 65–99)
GLUCOSE UR QL STRIP: NEGATIVE
HBA1C MFR BLD: 7.9 % OF TOTAL HGB
HDLC SERPL-MCNC: 60 MG/DL
HGB UR QL STRIP: NEGATIVE
HYALINE CASTS #/AREA URNS LPF: ABNORMAL /LPF
KETONES UR QL STRIP: NEGATIVE
LDLC SERPL CALC-MCNC: 86 MG/DL (CALC)
LEUKOCYTE ESTERASE UR QL STRIP: ABNORMAL
MICROALBUMIN UR-MCNC: 1.3 MG/DL
NITRITE UR QL STRIP: NEGATIVE
NONHDLC SERPL-MCNC: 113 MG/DL (CALC)
PH UR STRIP: 8 [PH] (ref 5–8)
POTASSIUM SERPL-SCNC: 3.9 MMOL/L (ref 3.5–5.3)
PROT SERPL-MCNC: 7 G/DL (ref 6.1–8.1)
PROT UR QL STRIP: NEGATIVE
RBC #/AREA URNS HPF: ABNORMAL /HPF
SERVICE CMNT-IMP: ABNORMAL
SODIUM SERPL-SCNC: 133 MMOL/L (ref 135–146)
SP GR UR STRIP: 1.01 (ref 1–1.03)
SQUAMOUS #/AREA URNS HPF: ABNORMAL /HPF
TRIGL SERPL-MCNC: 178 MG/DL
WBC #/AREA URNS HPF: ABNORMAL /HPF

## 2023-12-18 ENCOUNTER — HOSPITAL ENCOUNTER (OUTPATIENT)
Dept: RADIOLOGY | Facility: HOSPITAL | Age: 63
Discharge: HOME | End: 2023-12-18
Attending: PHYSICIAN ASSISTANT
Payer: COMMERCIAL

## 2023-12-18 ENCOUNTER — OFFICE VISIT (OUTPATIENT)
Dept: FAMILY MEDICINE | Facility: CLINIC | Age: 63
End: 2023-12-18
Payer: COMMERCIAL

## 2023-12-18 VITALS
WEIGHT: 250 LBS | HEIGHT: 63 IN | HEART RATE: 66 BPM | BODY MASS INDEX: 44.3 KG/M2 | DIASTOLIC BLOOD PRESSURE: 80 MMHG | SYSTOLIC BLOOD PRESSURE: 128 MMHG | OXYGEN SATURATION: 98 %

## 2023-12-18 DIAGNOSIS — M25.511 CHRONIC RIGHT SHOULDER PAIN: ICD-10-CM

## 2023-12-18 DIAGNOSIS — G89.29 CHRONIC RIGHT SHOULDER PAIN: Primary | ICD-10-CM

## 2023-12-18 DIAGNOSIS — G89.29 CHRONIC RIGHT SHOULDER PAIN: ICD-10-CM

## 2023-12-18 DIAGNOSIS — F43.21 GRIEF AT LOSS OF CHILD: ICD-10-CM

## 2023-12-18 DIAGNOSIS — E11.9 TYPE 2 DIABETES MELLITUS WITHOUT COMPLICATION, WITHOUT LONG-TERM CURRENT USE OF INSULIN: ICD-10-CM

## 2023-12-18 DIAGNOSIS — M25.511 CHRONIC RIGHT SHOULDER PAIN: Primary | ICD-10-CM

## 2023-12-18 DIAGNOSIS — G62.9 PERIPHERAL POLYNEUROPATHY: ICD-10-CM

## 2023-12-18 DIAGNOSIS — Z63.4 GRIEF AT LOSS OF CHILD: ICD-10-CM

## 2023-12-18 DIAGNOSIS — I10 ESSENTIAL HYPERTENSION: ICD-10-CM

## 2023-12-18 PROCEDURE — 72040 XR CERVICAL SPINE 2 OR 3 VIEWS: ICD-10-PCS | Mod: 26,,, | Performed by: RADIOLOGY

## 2023-12-18 PROCEDURE — 4010F ACE/ARB THERAPY RXD/TAKEN: CPT | Mod: CPTII,S$GLB,, | Performed by: PHYSICIAN ASSISTANT

## 2023-12-18 PROCEDURE — 3060F PR POS MICROALBUMINURIA RESULT DOCUMENTED/REVIEW: ICD-10-PCS | Mod: CPTII,S$GLB,, | Performed by: PHYSICIAN ASSISTANT

## 2023-12-18 PROCEDURE — 1159F MED LIST DOCD IN RCRD: CPT | Mod: CPTII,S$GLB,, | Performed by: PHYSICIAN ASSISTANT

## 2023-12-18 PROCEDURE — 3051F PR MOST RECENT HEMOGLOBIN A1C LEVEL 7.0 - < 8.0%: ICD-10-PCS | Mod: CPTII,S$GLB,, | Performed by: PHYSICIAN ASSISTANT

## 2023-12-18 PROCEDURE — 73030 XR SHOULDER COMPLETE 2 OR MORE VIEWS RIGHT: ICD-10-PCS | Mod: 26,RT,, | Performed by: RADIOLOGY

## 2023-12-18 PROCEDURE — 3060F POS MICROALBUMINURIA REV: CPT | Mod: CPTII,S$GLB,, | Performed by: PHYSICIAN ASSISTANT

## 2023-12-18 PROCEDURE — 3008F BODY MASS INDEX DOCD: CPT | Mod: CPTII,S$GLB,, | Performed by: PHYSICIAN ASSISTANT

## 2023-12-18 PROCEDURE — 3066F PR DOCUMENTATION OF TREATMENT FOR NEPHROPATHY: ICD-10-PCS | Mod: CPTII,S$GLB,, | Performed by: PHYSICIAN ASSISTANT

## 2023-12-18 PROCEDURE — 3074F SYST BP LT 130 MM HG: CPT | Mod: CPTII,S$GLB,, | Performed by: PHYSICIAN ASSISTANT

## 2023-12-18 PROCEDURE — 3008F PR BODY MASS INDEX (BMI) DOCUMENTED: ICD-10-PCS | Mod: CPTII,S$GLB,, | Performed by: PHYSICIAN ASSISTANT

## 2023-12-18 PROCEDURE — 73030 X-RAY EXAM OF SHOULDER: CPT | Mod: TC,RT

## 2023-12-18 PROCEDURE — 3074F PR MOST RECENT SYSTOLIC BLOOD PRESSURE < 130 MM HG: ICD-10-PCS | Mod: CPTII,S$GLB,, | Performed by: PHYSICIAN ASSISTANT

## 2023-12-18 PROCEDURE — 1159F PR MEDICATION LIST DOCUMENTED IN MEDICAL RECORD: ICD-10-PCS | Mod: CPTII,S$GLB,, | Performed by: PHYSICIAN ASSISTANT

## 2023-12-18 PROCEDURE — 3066F NEPHROPATHY DOC TX: CPT | Mod: CPTII,S$GLB,, | Performed by: PHYSICIAN ASSISTANT

## 2023-12-18 PROCEDURE — 72040 X-RAY EXAM NECK SPINE 2-3 VW: CPT | Mod: 26,,, | Performed by: RADIOLOGY

## 2023-12-18 PROCEDURE — 4010F PR ACE/ARB THEARPY RXD/TAKEN: ICD-10-PCS | Mod: CPTII,S$GLB,, | Performed by: PHYSICIAN ASSISTANT

## 2023-12-18 PROCEDURE — 3079F PR MOST RECENT DIASTOLIC BLOOD PRESSURE 80-89 MM HG: ICD-10-PCS | Mod: CPTII,S$GLB,, | Performed by: PHYSICIAN ASSISTANT

## 2023-12-18 PROCEDURE — 3079F DIAST BP 80-89 MM HG: CPT | Mod: CPTII,S$GLB,, | Performed by: PHYSICIAN ASSISTANT

## 2023-12-18 PROCEDURE — 99214 PR OFFICE/OUTPT VISIT, EST, LEVL IV, 30-39 MIN: ICD-10-PCS | Mod: S$GLB,,, | Performed by: PHYSICIAN ASSISTANT

## 2023-12-18 PROCEDURE — 73030 X-RAY EXAM OF SHOULDER: CPT | Mod: 26,RT,, | Performed by: RADIOLOGY

## 2023-12-18 PROCEDURE — 3051F HG A1C>EQUAL 7.0%<8.0%: CPT | Mod: CPTII,S$GLB,, | Performed by: PHYSICIAN ASSISTANT

## 2023-12-18 PROCEDURE — 99214 OFFICE O/P EST MOD 30 MIN: CPT | Mod: S$GLB,,, | Performed by: PHYSICIAN ASSISTANT

## 2023-12-18 PROCEDURE — 72040 X-RAY EXAM NECK SPINE 2-3 VW: CPT | Mod: TC

## 2023-12-18 RX ORDER — METFORMIN HYDROCHLORIDE 750 MG/1
750 TABLET, EXTENDED RELEASE ORAL 2 TIMES DAILY
Qty: 180 TABLET | Refills: 1 | Status: SHIPPED | OUTPATIENT
Start: 2023-12-18 | End: 2024-03-19 | Stop reason: SDUPTHER

## 2023-12-18 RX ORDER — SEMAGLUTIDE 2.68 MG/ML
2 INJECTION, SOLUTION SUBCUTANEOUS
Qty: 3 ML | Refills: 11 | Status: SHIPPED | OUTPATIENT
Start: 2023-12-18 | End: 2024-12-17

## 2023-12-18 RX ORDER — POTASSIUM CHLORIDE 750 MG/1
CAPSULE, EXTENDED RELEASE ORAL
Qty: 270 CAPSULE | Refills: 1 | Status: SHIPPED | OUTPATIENT
Start: 2023-12-18

## 2023-12-18 RX ORDER — CYCLOBENZAPRINE HCL 5 MG
5 TABLET ORAL 3 TIMES DAILY PRN
Qty: 30 TABLET | Refills: 0 | Status: SHIPPED | OUTPATIENT
Start: 2023-12-18

## 2023-12-18 RX ORDER — ATENOLOL AND CHLORTHALIDONE TABLET 50; 25 MG/1; MG/1
1 TABLET ORAL DAILY
Qty: 90 TABLET | Refills: 1 | Status: SHIPPED | OUTPATIENT
Start: 2023-12-18 | End: 2024-03-19 | Stop reason: SDUPTHER

## 2023-12-18 RX ORDER — TRAZODONE HYDROCHLORIDE 50 MG/1
50 TABLET ORAL NIGHTLY
Qty: 90 TABLET | Refills: 1 | Status: SHIPPED | OUTPATIENT
Start: 2023-12-18 | End: 2024-06-15

## 2023-12-18 RX ORDER — SIMVASTATIN 10 MG/1
10 TABLET, FILM COATED ORAL NIGHTLY
Qty: 90 TABLET | Refills: 1 | Status: SHIPPED | OUTPATIENT
Start: 2023-12-18 | End: 2024-12-17

## 2023-12-18 RX ORDER — GABAPENTIN 100 MG/1
CAPSULE ORAL
Qty: 90 CAPSULE | Refills: 1 | Status: SHIPPED | OUTPATIENT
Start: 2023-12-18

## 2023-12-18 RX ORDER — GLIPIZIDE 5 MG/1
TABLET ORAL
Qty: 90 TABLET | Refills: 1 | Status: SHIPPED | OUTPATIENT
Start: 2023-12-18

## 2023-12-18 RX ORDER — LOSARTAN POTASSIUM 50 MG/1
50 TABLET ORAL DAILY
Qty: 90 TABLET | Refills: 1 | Status: SHIPPED | OUTPATIENT
Start: 2023-12-18 | End: 2024-03-19 | Stop reason: SDUPTHER

## 2023-12-18 SDOH — SOCIAL DETERMINANTS OF HEALTH (SDOH): DISSAPEARANCE AND DEATH OF FAMILY MEMBER: Z63.4

## 2023-12-18 NOTE — PROGRESS NOTES
SUBJECTIVE:    Patient ID: Angelina Jennings is a 63 y.o. female.    Chief Complaint: Follow-up (Has bottles//routine follow up//only complaint is the chronic right shoulder pain//had labs)    Angelina Jennings is a 63 y.o. female past medical history of hypertension and diabetes presents for a 6 month follow up. Today her A1c is 7.9%, mild increase from 7.0% last visit. She reports she has been taking her medication as prescribed. Does not take blood sugar at home. States that her diet has not been well. Lots of carbs. Expresses interest in bettering her diet. Reports obtaining minimal exercise by going bowling once a week. Denies exertional symptoms. Overall she reports she has been doing well. Son and his family still lives with her, which she reports is going ok. Does complain of chronic right shoulder pain. Denies radiation of pain. Occasional bilateral numbness and tingling in hands. Requesting muscle relaxer for pain prn.    Diabetes  She has type 2 diabetes mellitus. No MedicAlert identification noted. Pertinent negatives for hypoglycemia include no confusion, dizziness, headaches, hunger, mood changes, nervousness/anxiousness, pallor, seizures, sleepiness, speech difficulty, sweats or tremors. Associated symptoms include foot paresthesias. Pertinent negatives for diabetes include no blurred vision, no chest pain, no fatigue, no foot ulcerations, no polydipsia, no polyphagia, no polyuria, no visual change, no weakness and no weight loss. Pertinent negatives for hypoglycemia complications include no blackouts, no hospitalization, no nocturnal hypoglycemia, no required assistance and no required glucagon injection. Symptoms are stable. Pertinent negatives for diabetic complications include no autonomic neuropathy, CVA, heart disease, nephropathy, peripheral neuropathy, PVD or retinopathy. Risk factors for coronary artery disease include dyslipidemia, hypertension, obesity, sedentary lifestyle and tobacco exposure.  Current diabetic treatment includes oral agent (dual therapy). She is compliant with treatment some of the time. Her weight is stable. When asked about meal planning, she reported none. She has not had a previous visit with a dietitian. She participates in exercise weekly. She monitors urine at home <1 x per month. There is no compliance with monitoring of blood glucose. She does not see a podiatrist.Eye exam is current.       Telephone on 11/29/2023   Component Date Value Ref Range Status    Glucose 12/14/2023 141 (H)  65 - 99 mg/dL Final    BUN 12/14/2023 10  7 - 25 mg/dL Final    Creatinine 12/14/2023 0.56  0.50 - 1.05 mg/dL Final    eGFR 12/14/2023 102  > OR = 60 mL/min/1.73m2 Final    BUN/Creatinine Ratio 12/14/2023 SEE NOTE:  6 - 22 (calc) Final    Sodium 12/14/2023 133 (L)  135 - 146 mmol/L Final    Potassium 12/14/2023 3.9  3.5 - 5.3 mmol/L Final    Chloride 12/14/2023 92 (L)  98 - 110 mmol/L Final    CO2 12/14/2023 28  20 - 32 mmol/L Final    Calcium 12/14/2023 10.4  8.6 - 10.4 mg/dL Final    Total Protein 12/14/2023 7.0  6.1 - 8.1 g/dL Final    Albumin 12/14/2023 4.4  3.6 - 5.1 g/dL Final    Globulin, Total 12/14/2023 2.6  1.9 - 3.7 g/dL (calc) Final    Albumin/Globulin Ratio 12/14/2023 1.7  1.0 - 2.5 (calc) Final    Total Bilirubin 12/14/2023 0.5  0.2 - 1.2 mg/dL Final    Alkaline Phosphatase 12/14/2023 81  37 - 153 U/L Final    AST 12/14/2023 19  10 - 35 U/L Final    ALT 12/14/2023 24  6 - 29 U/L Final    Hemoglobin A1C 12/14/2023 7.9 (H)  <5.7 % of total Hgb Final    Cholesterol 12/14/2023 173  <200 mg/dL Final    HDL 12/14/2023 60  > OR = 50 mg/dL Final    Triglycerides 12/14/2023 178 (H)  <150 mg/dL Final    LDL Cholesterol 12/14/2023 86  mg/dL (calc) Final    HDL/Cholesterol Ratio 12/14/2023 2.9  <5.0 (calc) Final    Non HDL Chol. (LDL+VLDL) 12/14/2023 113  <130 mg/dL (calc) Final    Creatinine, Urine 12/14/2023 22  20 - 275 mg/dL Final    Microalb, Ur 12/14/2023 1.3  See Note: mg/dL Final     Microalb/Creat Ratio 12/14/2023 59 (H)  <30 mcg/mg creat Final    Color, UA 12/14/2023 YELLOW  YELLOW Final    Appearance, UA 12/14/2023 CLEAR  CLEAR Final    Specific Gravity, UA 12/14/2023 1.007  1.001 - 1.035 Final    pH, UA 12/14/2023 8.0  5.0 - 8.0 Final    Glucose, UA 12/14/2023 NEGATIVE  NEGATIVE Final    Bilirubin, UA 12/14/2023 NEGATIVE  NEGATIVE Final    Ketones, UA 12/14/2023 NEGATIVE  NEGATIVE Final    Occult Blood UA 12/14/2023 NEGATIVE  NEGATIVE Final    Protein, UA 12/14/2023 NEGATIVE  NEGATIVE Final    Nitrite, UA 12/14/2023 NEGATIVE  NEGATIVE Final    Leukocytes, UA 12/14/2023 2+ (A)  NEGATIVE Final    WBC Casts, UA 12/14/2023 0-5  < OR = 5 /HPF Final    RBC Casts, UA 12/14/2023 NONE SEEN  < OR = 2 /HPF Final    Squam Epithel, UA 12/14/2023 0-5  < OR = 5 /HPF Final    Bacteria, UA 12/14/2023 NONE SEEN  NONE SEEN /HPF Final    Hyaline Casts, UA 12/14/2023 NONE SEEN  NONE SEEN /LPF Final    Service Cmt: 12/14/2023    Final    Reflexive Urine Culture 12/14/2023    Final    Urine Culture, Routine 12/14/2023    Final       Past Medical History:   Diagnosis Date    Diabetes mellitus, type 2     Hypertension      Past Surgical History:   Procedure Laterality Date    COLONOSCOPY  2022    Dr. Stacy-RTC 5 years    TONSILLECTOMY       Family History   Problem Relation Age of Onset    Early death Mother     Heart disease Father     Breast cancer Maternal Aunt 75    Cancer Maternal Aunt     Diabetes Maternal Grandfather     Cancer Paternal Grandmother     Heart disease Paternal Grandfather        Marital Status:   Alcohol History:  reports current alcohol use.  Tobacco History:  reports that she quit smoking about 10 years ago. Her smoking use included cigarettes. She started smoking about 45 years ago. She has a 52.5 pack-year smoking history. She has been exposed to tobacco smoke. She has never used smokeless tobacco.  Drug History:  reports no history of drug use.    Review of patient's allergies  indicates:  No Known Allergies    Current Outpatient Medications:     ascorbic acid, vitamin C, (VITAMIN C) 500 MG tablet, Take 500 mg by mouth once daily., Disp: , Rfl:     aspirin 81 MG Chew, Take 81 mg by mouth once daily. , Disp: , Rfl:     b complex vitamins tablet, Take 1 tablet by mouth once daily., Disp: , Rfl:     blood sugar diagnostic (ONETOUCH ULTRA BLUE TEST STRIP) Strp, 30 strips by In Vitro route once daily. as directed, Disp: 30 strip, Rfl: 11    blood sugar diagnostic Strp, To check BG 1 time daily, to use with insurance preferred meter, Disp: 200 each, Rfl: 1    ONETOUCH DELICA PLUS LANCET 33 gauge Misc, Apply topically once daily., Disp: , Rfl:     ONETOUCH ULTRA2 METER Misc, USE TO CHECK GLUCOSE ONCE DAILY, Disp: , Rfl:     semaglutide (OZEMPIC) 1 mg/dose (4 mg/3 mL), Inject 1 mg into the skin every 7 days., Disp: 1 each, Rfl: 5    triamcinolone acetonide 0.1% (KENALOG) 0.1 % cream, Apply topically 2 (two) times daily. To ear eczema, Disp: 30 g, Rfl: 1    zinc gluconate 50 mg tablet, Take 50 mg by mouth once daily., Disp: , Rfl:     atenoloL-chlorthalidone (TENORETIC) 50-25 mg Tab, Take 1 tablet by mouth once daily., Disp: 90 tablet, Rfl: 1    cyclobenzaprine (FLEXERIL) 5 MG tablet, Take 1 tablet (5 mg total) by mouth 3 (three) times daily as needed for Muscle spasms., Disp: 30 tablet, Rfl: 0    gabapentin (NEURONTIN) 100 MG capsule, TAKE 1 CAPSULE BY MOUTH IN THE EVENING, Disp: 90 capsule, Rfl: 1    glipiZIDE (GLUCOTROL) 5 MG tablet, Take 1/2 tablet or 1 tablet every morning, Disp: 90 tablet, Rfl: 1    losartan (COZAAR) 50 MG tablet, Take 1 tablet (50 mg total) by mouth once daily., Disp: 90 tablet, Rfl: 1    metFORMIN (GLUCOPHAGE-XR) 750 MG ER 24hr tablet, Take 1 tablet (750 mg total) by mouth 2 (two) times daily., Disp: 180 tablet, Rfl: 1    potassium chloride (MICRO-K) 10 MEQ CpSR, Take 3 capsules by mouth once daily for 180 doses, Disp: 270 capsule, Rfl: 1    semaglutide (OZEMPIC) 2 mg/dose  "(8 mg/3 mL) PnIj, Inject 2 mg into the skin every 7 days., Disp: 3 mL, Rfl: 11    simvastatin (ZOCOR) 10 MG tablet, Take 1 tablet (10 mg total) by mouth every evening., Disp: 90 tablet, Rfl: 1    traZODone (DESYREL) 50 MG tablet, Take 1 tablet (50 mg total) by mouth every evening., Disp: 90 tablet, Rfl: 1    Review of Systems   Constitutional:  Negative for chills, fatigue, fever and weight loss.   HENT:  Negative for congestion and sore throat.    Eyes:  Negative for blurred vision, discharge and redness.   Respiratory:  Negative for cough and shortness of breath.    Cardiovascular:  Negative for chest pain and palpitations.   Gastrointestinal:  Negative for abdominal pain, constipation and diarrhea.   Endocrine: Negative for polydipsia, polyphagia and polyuria.   Genitourinary:  Negative for dysuria and hematuria.   Musculoskeletal:  Negative for arthralgias.   Skin:  Negative for pallor.   Neurological:  Negative for dizziness, tremors, seizures, speech difficulty, weakness and headaches.   Psychiatric/Behavioral:  Negative for confusion. The patient is not nervous/anxious.           Objective:      Vitals:    12/18/23 0740   BP: 128/80   Pulse: 66   SpO2: 98%   Weight: 113.4 kg (250 lb)   Height: 5' 2.5" (1.588 m)     Physical Exam  Constitutional:       General: She is not in acute distress.     Appearance: She is not toxic-appearing.   HENT:      Head: Normocephalic and atraumatic.      Nose: Nose normal.      Mouth/Throat:      Mouth: Mucous membranes are moist.      Pharynx: Oropharynx is clear.   Eyes:      Extraocular Movements: Extraocular movements intact.      Conjunctiva/sclera: Conjunctivae normal.      Pupils: Pupils are equal, round, and reactive to light.   Cardiovascular:      Rate and Rhythm: Normal rate and regular rhythm.      Pulses: Normal pulses.      Heart sounds: Normal heart sounds.   Pulmonary:      Effort: Pulmonary effort is normal. No respiratory distress.      Breath sounds: Normal " breath sounds. No stridor. No wheezing, rhonchi or rales.   Abdominal:      General: There is no distension.      Palpations: Abdomen is soft.   Musculoskeletal:         General: Normal range of motion.      Right shoulder: Tenderness present. No swelling, deformity, bony tenderness or crepitus. Normal range of motion. Normal strength.      Left shoulder: Normal.      Cervical back: Normal range of motion and neck supple.   Skin:     General: Skin is warm and dry.      Capillary Refill: Capillary refill takes less than 2 seconds.      Coloration: Skin is not pale.   Neurological:      General: No focal deficit present.      Mental Status: She is alert and oriented to person, place, and time.      Comments: Negative phalen's test. Negative tinel's test.   Psychiatric:         Mood and Affect: Mood normal.         Behavior: Behavior normal.           Assessment:       1. Chronic right shoulder pain    2. Type 2 diabetes mellitus without complication, without long-term current use of insulin    3. Essential hypertension    4. Peripheral polyneuropathy    5. Grief at loss of child         Plan:       Chronic right shoulder pain  Comments:  Neck and shoulder xrays for further evaluation ordered. Will prescribe muscle relaxant for pain.  Orders:  -     cyclobenzaprine (FLEXERIL) 5 MG tablet; Take 1 tablet (5 mg total) by mouth 3 (three) times daily as needed for Muscle spasms.  Dispense: 30 tablet; Refill: 0  -     X-Ray Cervical Spine 2 or 3 Views; Future; Expected date: 12/18/2023  -     X-ray Shoulder 2 or More Views Right; Future; Expected date: 12/18/2023    Type 2 diabetes mellitus without complication, without long-term current use of insulin  Comments:  a1c increased to 7.9%. will increased ozempic to 2mg. continue other medications as is. Discussed importance of proper exercise and diet.  Orders:  -     glipiZIDE (GLUCOTROL) 5 MG tablet; Take 1/2 tablet or 1 tablet every morning  Dispense: 90 tablet; Refill: 1  -      metFORMIN (GLUCOPHAGE-XR) 750 MG ER 24hr tablet; Take 1 tablet (750 mg total) by mouth 2 (two) times daily.  Dispense: 180 tablet; Refill: 1  -     simvastatin (ZOCOR) 10 MG tablet; Take 1 tablet (10 mg total) by mouth every evening.  Dispense: 90 tablet; Refill: 1    Essential hypertension  Comments:  stable. continue as is  Orders:  -     atenoloL-chlorthalidone (TENORETIC) 50-25 mg Tab; Take 1 tablet by mouth once daily.  Dispense: 90 tablet; Refill: 1  -     losartan (COZAAR) 50 MG tablet; Take 1 tablet (50 mg total) by mouth once daily.  Dispense: 90 tablet; Refill: 1  -     potassium chloride (MICRO-K) 10 MEQ CpSR; Take 3 capsules by mouth once daily for 180 doses  Dispense: 270 capsule; Refill: 1    Peripheral polyneuropathy  Comments:  she is on gabapentin with decent effect. will consider increasing dose if needed.  Orders:  -     gabapentin (NEURONTIN) 100 MG capsule; TAKE 1 CAPSULE BY MOUTH IN THE EVENING  Dispense: 90 capsule; Refill: 1    Grief at loss of child  -     traZODone (DESYREL) 50 MG tablet; Take 1 tablet (50 mg total) by mouth every evening.  Dispense: 90 tablet; Refill: 1    Other orders  -     semaglutide (OZEMPIC) 2 mg/dose (8 mg/3 mL) PnIj; Inject 2 mg into the skin every 7 days.  Dispense: 3 mL; Refill: 11      Follow up in about 3 months (around 3/18/2024) for Diabetic Check-Up.        12/18/2023 Mendoza Hardy PA-C

## 2023-12-22 ENCOUNTER — TELEPHONE (OUTPATIENT)
Dept: FAMILY MEDICINE | Facility: CLINIC | Age: 63
End: 2023-12-22
Payer: COMMERCIAL

## 2024-03-19 ENCOUNTER — OFFICE VISIT (OUTPATIENT)
Dept: FAMILY MEDICINE | Facility: CLINIC | Age: 64
End: 2024-03-19
Payer: COMMERCIAL

## 2024-03-19 VITALS
HEIGHT: 63 IN | BODY MASS INDEX: 43.41 KG/M2 | OXYGEN SATURATION: 98 % | RESPIRATION RATE: 18 BRPM | HEART RATE: 69 BPM | SYSTOLIC BLOOD PRESSURE: 131 MMHG | WEIGHT: 245 LBS | DIASTOLIC BLOOD PRESSURE: 68 MMHG

## 2024-03-19 DIAGNOSIS — I10 ESSENTIAL HYPERTENSION: ICD-10-CM

## 2024-03-19 DIAGNOSIS — E11.9 TYPE 2 DIABETES MELLITUS WITHOUT COMPLICATION, WITHOUT LONG-TERM CURRENT USE OF INSULIN: Primary | ICD-10-CM

## 2024-03-19 DIAGNOSIS — L20.84 INTRINSIC ECZEMA: ICD-10-CM

## 2024-03-19 LAB — HBA1C MFR BLD: 6.7 %

## 2024-03-19 PROCEDURE — 4010F ACE/ARB THERAPY RXD/TAKEN: CPT | Mod: CPTII,S$GLB,, | Performed by: PHYSICIAN ASSISTANT

## 2024-03-19 PROCEDURE — 1159F MED LIST DOCD IN RCRD: CPT | Mod: CPTII,S$GLB,, | Performed by: PHYSICIAN ASSISTANT

## 2024-03-19 PROCEDURE — 83036 HEMOGLOBIN GLYCOSYLATED A1C: CPT | Mod: QW,,, | Performed by: PHYSICIAN ASSISTANT

## 2024-03-19 PROCEDURE — 3075F SYST BP GE 130 - 139MM HG: CPT | Mod: CPTII,S$GLB,, | Performed by: PHYSICIAN ASSISTANT

## 2024-03-19 PROCEDURE — 99214 OFFICE O/P EST MOD 30 MIN: CPT | Mod: S$GLB,,, | Performed by: PHYSICIAN ASSISTANT

## 2024-03-19 PROCEDURE — 3008F BODY MASS INDEX DOCD: CPT | Mod: CPTII,S$GLB,, | Performed by: PHYSICIAN ASSISTANT

## 2024-03-19 PROCEDURE — 3078F DIAST BP <80 MM HG: CPT | Mod: CPTII,S$GLB,, | Performed by: PHYSICIAN ASSISTANT

## 2024-03-19 RX ORDER — ATENOLOL AND CHLORTHALIDONE TABLET 50; 25 MG/1; MG/1
1 TABLET ORAL DAILY
Qty: 90 TABLET | Refills: 1 | Status: SHIPPED | OUTPATIENT
Start: 2024-03-19 | End: 2024-09-15

## 2024-03-19 RX ORDER — TRIAMCINOLONE ACETONIDE 1 MG/G
CREAM TOPICAL 2 TIMES DAILY
Qty: 30 G | Refills: 1 | Status: SHIPPED | OUTPATIENT
Start: 2024-03-19

## 2024-03-19 RX ORDER — METFORMIN HYDROCHLORIDE 750 MG/1
750 TABLET, EXTENDED RELEASE ORAL 2 TIMES DAILY
Qty: 180 TABLET | Refills: 1 | Status: SHIPPED | OUTPATIENT
Start: 2024-03-19 | End: 2024-09-15

## 2024-03-19 RX ORDER — LOSARTAN POTASSIUM 50 MG/1
50 TABLET ORAL DAILY
Qty: 90 TABLET | Refills: 1 | Status: SHIPPED | OUTPATIENT
Start: 2024-03-19 | End: 2025-03-19

## 2024-03-19 RX ORDER — LANCETS
EACH MISCELLANEOUS
Qty: 90 EACH | Refills: 1 | Status: SHIPPED | OUTPATIENT
Start: 2024-03-19

## 2024-03-19 NOTE — PROGRESS NOTES
SUBJECTIVE:    Patient ID: Angelina Jennings is a 63 y.o. female.    Chief Complaint: Follow-up (Brought bottles// refills needed// diabetic check // pt states she have no complaints)    63-year-old female presents today for regular checkup, diabetic.  Recent labs in December.  A1c at that time 7.9%.  Today it has improved to 6.7% and she has seen nice improvement in her diet and weight is down about 5 lbs. No new concerns at this time. Feels well. More energetic. Sleeping better.  Has had a neck massage recently that has really helped with her neck.    Follow-up  Pertinent negatives include no arthralgias, chest pain, headaches, joint swelling, neck pain, vomiting or weakness.       Telephone on 11/29/2023   Component Date Value Ref Range Status    Glucose 12/14/2023 141 (H)  65 - 99 mg/dL Final    BUN 12/14/2023 10  7 - 25 mg/dL Final    Creatinine 12/14/2023 0.56  0.50 - 1.05 mg/dL Final    eGFR 12/14/2023 102  > OR = 60 mL/min/1.73m2 Final    BUN/Creatinine Ratio 12/14/2023 SEE NOTE:  6 - 22 (calc) Final    Sodium 12/14/2023 133 (L)  135 - 146 mmol/L Final    Potassium 12/14/2023 3.9  3.5 - 5.3 mmol/L Final    Chloride 12/14/2023 92 (L)  98 - 110 mmol/L Final    CO2 12/14/2023 28  20 - 32 mmol/L Final    Calcium 12/14/2023 10.4  8.6 - 10.4 mg/dL Final    Total Protein 12/14/2023 7.0  6.1 - 8.1 g/dL Final    Albumin 12/14/2023 4.4  3.6 - 5.1 g/dL Final    Globulin, Total 12/14/2023 2.6  1.9 - 3.7 g/dL (calc) Final    Albumin/Globulin Ratio 12/14/2023 1.7  1.0 - 2.5 (calc) Final    Total Bilirubin 12/14/2023 0.5  0.2 - 1.2 mg/dL Final    Alkaline Phosphatase 12/14/2023 81  37 - 153 U/L Final    AST 12/14/2023 19  10 - 35 U/L Final    ALT 12/14/2023 24  6 - 29 U/L Final    Hemoglobin A1C 12/14/2023 7.9 (H)  <5.7 % of total Hgb Final    Cholesterol 12/14/2023 173  <200 mg/dL Final    HDL 12/14/2023 60  > OR = 50 mg/dL Final    Triglycerides 12/14/2023 178 (H)  <150 mg/dL Final    LDL Cholesterol 12/14/2023 86  mg/dL  (calc) Final    HDL/Cholesterol Ratio 12/14/2023 2.9  <5.0 (calc) Final    Non HDL Chol. (LDL+VLDL) 12/14/2023 113  <130 mg/dL (calc) Final    Creatinine, Urine 12/14/2023 22  20 - 275 mg/dL Final    Microalb, Ur 12/14/2023 1.3  See Note: mg/dL Final    Microalb/Creat Ratio 12/14/2023 59 (H)  <30 mcg/mg creat Final    Color, UA 12/14/2023 YELLOW  YELLOW Final    Appearance, UA 12/14/2023 CLEAR  CLEAR Final    Specific Gravity, UA 12/14/2023 1.007  1.001 - 1.035 Final    pH, UA 12/14/2023 8.0  5.0 - 8.0 Final    Glucose, UA 12/14/2023 NEGATIVE  NEGATIVE Final    Bilirubin, UA 12/14/2023 NEGATIVE  NEGATIVE Final    Ketones, UA 12/14/2023 NEGATIVE  NEGATIVE Final    Occult Blood UA 12/14/2023 NEGATIVE  NEGATIVE Final    Protein, UA 12/14/2023 NEGATIVE  NEGATIVE Final    Nitrite, UA 12/14/2023 NEGATIVE  NEGATIVE Final    Leukocytes, UA 12/14/2023 2+ (A)  NEGATIVE Final    WBC Casts, UA 12/14/2023 0-5  < OR = 5 /HPF Final    RBC Casts, UA 12/14/2023 NONE SEEN  < OR = 2 /HPF Final    Squam Epithel, UA 12/14/2023 0-5  < OR = 5 /HPF Final    Bacteria, UA 12/14/2023 NONE SEEN  NONE SEEN /HPF Final    Hyaline Casts, UA 12/14/2023 NONE SEEN  NONE SEEN /LPF Final    Service Cmt: 12/14/2023    Final    Reflexive Urine Culture 12/14/2023    Final    Urine Culture, Routine 12/14/2023    Final       Past Medical History:   Diagnosis Date    Diabetes mellitus, type 2     Hypertension      Past Surgical History:   Procedure Laterality Date    COLONOSCOPY  2022    Dr. Stacy-[facility] 5 years    TONSILLECTOMY       Family History   Problem Relation Age of Onset    Early death Mother     Heart disease Father     Breast cancer Maternal Aunt 75    Cancer Maternal Aunt     Diabetes Maternal Grandfather     Cancer Paternal Grandmother     Heart disease Paternal Grandfather        Marital Status:   Alcohol History:  reports current alcohol use.  Tobacco History:  reports that she quit smoking about 10 years ago. Her smoking use included  cigarettes. She started smoking about 45 years ago. She has a 52.5 pack-year smoking history. She has been exposed to tobacco smoke. She has never used smokeless tobacco.  Drug History:  reports no history of drug use.    Review of patient's allergies indicates:  No Known Allergies    Current Outpatient Medications:     ascorbic acid, vitamin C, (VITAMIN C) 500 MG tablet, Take 500 mg by mouth once daily., Disp: , Rfl:     aspirin 81 MG Chew, Take 81 mg by mouth once daily. , Disp: , Rfl:     cyclobenzaprine (FLEXERIL) 5 MG tablet, Take 1 tablet (5 mg total) by mouth 3 (three) times daily as needed for Muscle spasms., Disp: 30 tablet, Rfl: 0    gabapentin (NEURONTIN) 100 MG capsule, TAKE 1 CAPSULE BY MOUTH IN THE EVENING, Disp: 90 capsule, Rfl: 1    glipiZIDE (GLUCOTROL) 5 MG tablet, Take 1/2 tablet or 1 tablet every morning, Disp: 90 tablet, Rfl: 1    potassium chloride (MICRO-K) 10 MEQ CpSR, Take 3 capsules by mouth once daily for 180 doses, Disp: 270 capsule, Rfl: 1    simvastatin (ZOCOR) 10 MG tablet, Take 1 tablet (10 mg total) by mouth every evening., Disp: 90 tablet, Rfl: 1    traZODone (DESYREL) 50 MG tablet, Take 1 tablet (50 mg total) by mouth every evening., Disp: 90 tablet, Rfl: 1    zinc gluconate 50 mg tablet, Take 50 mg by mouth once daily., Disp: , Rfl:     atenoloL-chlorthalidone (TENORETIC) 50-25 mg Tab, Take 1 tablet by mouth once daily., Disp: 90 tablet, Rfl: 1    b complex vitamins tablet, Take 1 tablet by mouth once daily., Disp: , Rfl:     blood sugar diagnostic (ONETOUCH ULTRA BLUE TEST STRIP) Strp, 30 strips by In Vitro route once daily. as directed, Disp: 30 strip, Rfl: 11    blood sugar diagnostic Strp, To check BG 1 time daily, to use with insurance preferred meter, Disp: 200 each, Rfl: 1    blood sugar diagnostic Strp, To check BG 2 times daily, to use with insurance preferred meter, Disp: 100 strip, Rfl: 1    lancets Misc, To check BG 2 times daily, to use with insurance preferred meter,  "Disp: 90 each, Rfl: 1    losartan (COZAAR) 50 MG tablet, Take 1 tablet (50 mg total) by mouth once daily., Disp: 90 tablet, Rfl: 1    metFORMIN (GLUCOPHAGE-XR) 750 MG ER 24hr tablet, Take 1 tablet (750 mg total) by mouth 2 (two) times daily., Disp: 180 tablet, Rfl: 1    ONETOUCH DELICA PLUS LANCET 33 gauge Misc, Apply topically once daily., Disp: , Rfl:     ONETOUCH ULTRA2 METER Misc, USE TO CHECK GLUCOSE ONCE DAILY, Disp: , Rfl:     semaglutide (OZEMPIC) 1 mg/dose (4 mg/3 mL), Inject 1 mg into the skin every 7 days., Disp: 1 each, Rfl: 5    semaglutide (OZEMPIC) 2 mg/dose (8 mg/3 mL) PnIj, Inject 2 mg into the skin every 7 days., Disp: 3 mL, Rfl: 11    triamcinolone acetonide 0.1% (KENALOG) 0.1 % cream, Apply topically 2 (two) times daily. To ear eczema, Disp: 30 g, Rfl: 1    Review of Systems   Constitutional:  Negative for activity change and unexpected weight change.   HENT:  Negative for hearing loss, rhinorrhea and trouble swallowing.    Eyes:  Negative for discharge and visual disturbance.   Respiratory:  Negative for chest tightness and wheezing.    Cardiovascular:  Negative for chest pain and palpitations.   Gastrointestinal:  Negative for blood in stool, constipation, diarrhea and vomiting.   Endocrine: Negative for polydipsia and polyuria.   Genitourinary:  Negative for difficulty urinating, dysuria, hematuria and menstrual problem.   Musculoskeletal:  Negative for arthralgias, joint swelling and neck pain.   Neurological:  Negative for weakness and headaches.   Psychiatric/Behavioral:  Negative for confusion and dysphoric mood.           Objective:      Vitals:    03/19/24 0817   BP: 131/68   Pulse: 69   Resp: 18   SpO2: 98%   Weight: 111.1 kg (245 lb)   Height: 5' 2.5" (1.588 m)     Physical Exam  Constitutional:       General: She is not in acute distress.     Appearance: She is well-developed.   HENT:      Head: Normocephalic and atraumatic.   Eyes:      Conjunctiva/sclera: Conjunctivae normal.      " Pupils: Pupils are equal, round, and reactive to light.   Neck:      Thyroid: No thyromegaly.   Cardiovascular:      Rate and Rhythm: Normal rate and regular rhythm.      Heart sounds: Normal heart sounds.   Pulmonary:      Effort: Pulmonary effort is normal.      Breath sounds: Normal breath sounds.   Abdominal:      General: Bowel sounds are normal. There is no distension.      Palpations: Abdomen is soft.      Tenderness: There is no abdominal tenderness.   Musculoskeletal:         General: Normal range of motion.      Cervical back: Normal range of motion and neck supple.   Skin:     General: Skin is warm and dry.      Findings: No erythema.   Neurological:      Mental Status: She is alert and oriented to person, place, and time.      Cranial Nerves: No cranial nerve deficit.           Assessment:       1. Type 2 diabetes mellitus without complication, without long-term current use of insulin    2. Essential hypertension    3. Intrinsic eczema         Plan:       Type 2 diabetes mellitus without complication, without long-term current use of insulin  Comments:  a1c IMPROVED to 6.7%. will continue with ozempic to 2mg. continue other medications as is. Discussed importance of proper exercise and diet.  Orders:  -     metFORMIN (GLUCOPHAGE-XR) 750 MG ER 24hr tablet; Take 1 tablet (750 mg total) by mouth 2 (two) times daily.  Dispense: 180 tablet; Refill: 1  -     POCT HEMOGLOBIN A1C  -     lancets Misc; To check BG 2 times daily, to use with insurance preferred meter  Dispense: 90 each; Refill: 1  -     blood sugar diagnostic Strp; To check BG 2 times daily, to use with insurance preferred meter  Dispense: 100 strip; Refill: 1    Essential hypertension  Comments:  stable. continue as is  Orders:  -     losartan (COZAAR) 50 MG tablet; Take 1 tablet (50 mg total) by mouth once daily.  Dispense: 90 tablet; Refill: 1  -     atenoloL-chlorthalidone (TENORETIC) 50-25 mg Tab; Take 1 tablet by mouth once daily.  Dispense:  90 tablet; Refill: 1    Intrinsic eczema  -     triamcinolone acetonide 0.1% (KENALOG) 0.1 % cream; Apply topically 2 (two) times daily. To ear eczema  Dispense: 30 g; Refill: 1      Follow up in about 6 months (around 9/19/2024) for Diabetic Check-Up.        3/19/2024 Mendoza Hardy PA-C

## 2024-07-12 DIAGNOSIS — I10 ESSENTIAL HYPERTENSION: ICD-10-CM

## 2024-07-12 RX ORDER — POTASSIUM CHLORIDE 750 MG/1
CAPSULE, EXTENDED RELEASE ORAL
Qty: 270 CAPSULE | Refills: 1 | Status: SHIPPED | OUTPATIENT
Start: 2024-07-12

## 2024-07-31 DIAGNOSIS — M25.511 CHRONIC RIGHT SHOULDER PAIN: ICD-10-CM

## 2024-07-31 DIAGNOSIS — G89.29 CHRONIC RIGHT SHOULDER PAIN: ICD-10-CM

## 2024-07-31 DIAGNOSIS — E11.9 TYPE 2 DIABETES MELLITUS WITHOUT COMPLICATION, WITHOUT LONG-TERM CURRENT USE OF INSULIN: ICD-10-CM

## 2024-07-31 RX ORDER — CYCLOBENZAPRINE HCL 5 MG
5 TABLET ORAL 3 TIMES DAILY PRN
Qty: 30 TABLET | Refills: 0 | Status: SHIPPED | OUTPATIENT
Start: 2024-07-31

## 2024-07-31 RX ORDER — GLIPIZIDE 5 MG/1
TABLET ORAL
Qty: 90 TABLET | Refills: 1 | Status: SHIPPED | OUTPATIENT
Start: 2024-07-31

## 2024-08-29 DIAGNOSIS — Z63.4 GRIEF AT LOSS OF CHILD: ICD-10-CM

## 2024-08-29 DIAGNOSIS — F43.21 GRIEF AT LOSS OF CHILD: ICD-10-CM

## 2024-08-29 SDOH — SOCIAL DETERMINANTS OF HEALTH (SDOH): DISSAPEARANCE AND DEATH OF FAMILY MEMBER: Z63.4

## 2024-08-30 RX ORDER — TRAZODONE HYDROCHLORIDE 50 MG/1
50 TABLET ORAL NIGHTLY
Qty: 90 TABLET | Refills: 1 | Status: SHIPPED | OUTPATIENT
Start: 2024-08-30 | End: 2025-02-26

## 2024-09-24 ENCOUNTER — OFFICE VISIT (OUTPATIENT)
Dept: FAMILY MEDICINE | Facility: CLINIC | Age: 64
End: 2024-09-24
Payer: COMMERCIAL

## 2024-09-24 VITALS
HEIGHT: 62 IN | HEART RATE: 68 BPM | RESPIRATION RATE: 18 BRPM | DIASTOLIC BLOOD PRESSURE: 78 MMHG | WEIGHT: 242 LBS | SYSTOLIC BLOOD PRESSURE: 138 MMHG | BODY MASS INDEX: 44.53 KG/M2 | OXYGEN SATURATION: 96 %

## 2024-09-24 DIAGNOSIS — M25.511 CHRONIC RIGHT SHOULDER PAIN: ICD-10-CM

## 2024-09-24 DIAGNOSIS — G62.9 PERIPHERAL POLYNEUROPATHY: ICD-10-CM

## 2024-09-24 DIAGNOSIS — Z63.4 GRIEF AT LOSS OF CHILD: ICD-10-CM

## 2024-09-24 DIAGNOSIS — E11.9 TYPE 2 DIABETES MELLITUS WITHOUT COMPLICATION, WITHOUT LONG-TERM CURRENT USE OF INSULIN: ICD-10-CM

## 2024-09-24 DIAGNOSIS — F43.21 GRIEF AT LOSS OF CHILD: ICD-10-CM

## 2024-09-24 DIAGNOSIS — I10 ESSENTIAL HYPERTENSION: ICD-10-CM

## 2024-09-24 DIAGNOSIS — G89.29 CHRONIC RIGHT SHOULDER PAIN: ICD-10-CM

## 2024-09-24 LAB — HBA1C MFR BLD: 6.7 %

## 2024-09-24 PROCEDURE — 4010F ACE/ARB THERAPY RXD/TAKEN: CPT | Mod: CPTII,S$GLB,, | Performed by: PHYSICIAN ASSISTANT

## 2024-09-24 PROCEDURE — 83036 HEMOGLOBIN GLYCOSYLATED A1C: CPT | Mod: QW,,, | Performed by: PHYSICIAN ASSISTANT

## 2024-09-24 PROCEDURE — 3044F HG A1C LEVEL LT 7.0%: CPT | Mod: CPTII,S$GLB,, | Performed by: PHYSICIAN ASSISTANT

## 2024-09-24 PROCEDURE — 90656 IIV3 VACC NO PRSV 0.5 ML IM: CPT | Mod: S$GLB,,, | Performed by: PHYSICIAN ASSISTANT

## 2024-09-24 PROCEDURE — 1159F MED LIST DOCD IN RCRD: CPT | Mod: CPTII,S$GLB,, | Performed by: PHYSICIAN ASSISTANT

## 2024-09-24 PROCEDURE — 3078F DIAST BP <80 MM HG: CPT | Mod: CPTII,S$GLB,, | Performed by: PHYSICIAN ASSISTANT

## 2024-09-24 PROCEDURE — 99214 OFFICE O/P EST MOD 30 MIN: CPT | Mod: 25,S$GLB,, | Performed by: PHYSICIAN ASSISTANT

## 2024-09-24 PROCEDURE — 3008F BODY MASS INDEX DOCD: CPT | Mod: CPTII,S$GLB,, | Performed by: PHYSICIAN ASSISTANT

## 2024-09-24 PROCEDURE — 3075F SYST BP GE 130 - 139MM HG: CPT | Mod: CPTII,S$GLB,, | Performed by: PHYSICIAN ASSISTANT

## 2024-09-24 PROCEDURE — 90471 IMMUNIZATION ADMIN: CPT | Mod: S$GLB,,, | Performed by: PHYSICIAN ASSISTANT

## 2024-09-24 RX ORDER — GLIPIZIDE 5 MG/1
TABLET ORAL
Qty: 90 TABLET | Refills: 1 | Status: SHIPPED | OUTPATIENT
Start: 2024-09-24

## 2024-09-24 RX ORDER — SEMAGLUTIDE 2.68 MG/ML
2 INJECTION, SOLUTION SUBCUTANEOUS
Qty: 9 ML | Refills: 3 | Status: SHIPPED | OUTPATIENT
Start: 2024-09-24 | End: 2025-09-24

## 2024-09-24 RX ORDER — ATENOLOL AND CHLORTHALIDONE TABLET 50; 25 MG/1; MG/1
1 TABLET ORAL DAILY
Qty: 90 TABLET | Refills: 1 | Status: SHIPPED | OUTPATIENT
Start: 2024-09-24 | End: 2025-03-23

## 2024-09-24 RX ORDER — TRAZODONE HYDROCHLORIDE 50 MG/1
50 TABLET ORAL NIGHTLY
Qty: 90 TABLET | Refills: 1 | Status: SHIPPED | OUTPATIENT
Start: 2024-09-24 | End: 2025-03-23

## 2024-09-24 RX ORDER — CYCLOBENZAPRINE HCL 5 MG
5 TABLET ORAL 3 TIMES DAILY PRN
Qty: 30 TABLET | Refills: 0 | Status: SHIPPED | OUTPATIENT
Start: 2024-09-24

## 2024-09-24 RX ORDER — SIMVASTATIN 10 MG/1
10 TABLET, FILM COATED ORAL NIGHTLY
Qty: 90 TABLET | Refills: 1 | Status: SHIPPED | OUTPATIENT
Start: 2024-09-24 | End: 2025-09-24

## 2024-09-24 RX ORDER — GABAPENTIN 100 MG/1
CAPSULE ORAL
Qty: 90 CAPSULE | Refills: 1 | Status: SHIPPED | OUTPATIENT
Start: 2024-09-24

## 2024-09-24 RX ORDER — POTASSIUM CHLORIDE 750 MG/1
CAPSULE, EXTENDED RELEASE ORAL
Qty: 270 CAPSULE | Refills: 1 | Status: SHIPPED | OUTPATIENT
Start: 2024-09-24

## 2024-09-24 RX ORDER — METFORMIN HYDROCHLORIDE 750 MG/1
750 TABLET, EXTENDED RELEASE ORAL 2 TIMES DAILY
Qty: 180 TABLET | Refills: 1 | Status: SHIPPED | OUTPATIENT
Start: 2024-09-24 | End: 2025-03-23

## 2024-09-24 RX ORDER — LOSARTAN POTASSIUM 50 MG/1
50 TABLET ORAL DAILY
Qty: 90 TABLET | Refills: 1 | Status: SHIPPED | OUTPATIENT
Start: 2024-09-24 | End: 2025-09-24

## 2024-09-24 SDOH — SOCIAL DETERMINANTS OF HEALTH (SDOH): DISSAPEARANCE AND DEATH OF FAMILY MEMBER: Z63.4

## 2024-09-24 NOTE — PROGRESS NOTES
SUBJECTIVE:    Patient ID: Angelina Jennings is a 64 y.o. female.    Chief Complaint: Follow-up (Brought bottles/ refills needed// pt states she have no complaints today//pt agrees to foot exam and flu vaccine )    This is a 65 yo female who presents for regular checkup and refills. Reports that she has been doing well overall. A1c at 6.7% and very well managed. She is sedentary and works a desk job. Some swelling in the lower extremity. Open to trying compression. She will stay moving all throughout the day.     Diabetes  She has type 2 diabetes mellitus. No MedicAlert identification noted. The initial diagnosis of diabetes was made 8 years ago. Pertinent negatives for hypoglycemia include no confusion, dizziness, headaches, hunger, mood changes, nervousness/anxiousness, pallor, seizures, sleepiness, speech difficulty, sweats or tremors. Associated symptoms include foot paresthesias. Pertinent negatives for hypoglycemia complications include no blackouts, no hospitalization, no nocturnal hypoglycemia, no required assistance and no required glucagon injection. Symptoms are stable. Pertinent negatives for diabetic complications include no autonomic neuropathy, CVA, heart disease, nephropathy, peripheral neuropathy, PVD or retinopathy. Risk factors for coronary artery disease include dyslipidemia, hypertension, obesity and sedentary lifestyle. Current diabetic treatment includes oral agent (dual therapy). Her weight is fluctuating minimally. She is following a generally unhealthy diet. She has not had a previous visit with a dietitian. She participates in exercise weekly. She monitors urine at home <1 x per month. Blood glucose monitoring compliance is poor. She does not see a podiatrist.Eye exam is current.       No visits with results within 6 Month(s) from this visit.   Latest known visit with results is:   Office Visit on 03/19/2024   Component Date Value Ref Range Status    Hemoglobin A1C, POC 03/19/2024 6.7  % Final        Past Medical History:   Diagnosis Date    Diabetes mellitus, type 2     Hypertension      Past Surgical History:   Procedure Laterality Date    COLONOSCOPY  2022    Dr. Stacy[facility] 5 years    TONSILLECTOMY       Family History   Problem Relation Name Age of Onset    Early death Mother *     Heart disease Father *     Breast cancer Maternal Aunt * 75    Cancer Maternal Aunt *     Diabetes Maternal Grandfather *     Cancer Paternal Grandmother *     Heart disease Paternal Grandfather *        Marital Status:   Alcohol History:  reports current alcohol use.  Tobacco History:  reports that she quit smoking about 11 years ago. Her smoking use included cigarettes. She started smoking about 46 years ago. She has a 52.5 pack-year smoking history. She has been exposed to tobacco smoke. She has never used smokeless tobacco.  Drug History:  reports no history of drug use.    Review of patient's allergies indicates:  No Known Allergies    Current Outpatient Medications:     ascorbic acid, vitamin C, (VITAMIN C) 500 MG tablet, Take 500 mg by mouth once daily., Disp: , Rfl:     aspirin 81 MG Chew, Take 81 mg by mouth once daily. , Disp: , Rfl:     b complex vitamins tablet, Take 1 tablet by mouth once daily., Disp: , Rfl:     triamcinolone acetonide 0.1% (KENALOG) 0.1 % cream, Apply topically 2 (two) times daily. To ear eczema, Disp: 30 g, Rfl: 1    zinc gluconate 50 mg tablet, Take 50 mg by mouth once daily., Disp: , Rfl:     atenoloL-chlorthalidone (TENORETIC) 50-25 mg Tab, Take 1 tablet by mouth once daily., Disp: 90 tablet, Rfl: 1    blood sugar diagnostic (ONETOUCH ULTRA BLUE TEST STRIP) Strp, 30 strips by In Vitro route once daily. as directed, Disp: 30 strip, Rfl: 11    blood sugar diagnostic Strp, To check BG 1 time daily, to use with insurance preferred meter, Disp: 200 each, Rfl: 1    blood sugar diagnostic Strp, To check BG 2 times daily, to use with insurance preferred meter, Disp: 100 strip, Rfl: 1     "cyclobenzaprine (FLEXERIL) 5 MG tablet, Take 1 tablet (5 mg total) by mouth 3 (three) times daily as needed for Muscle spasms., Disp: 30 tablet, Rfl: 0    gabapentin (NEURONTIN) 100 MG capsule, TAKE 1 CAPSULE BY MOUTH IN THE EVENING, Disp: 90 capsule, Rfl: 1    glipiZIDE (GLUCOTROL) 5 MG tablet, Take 1/2 tablet or 1 tablet every morning, Disp: 90 tablet, Rfl: 1    lancets Misc, To check BG 2 times daily, to use with insurance preferred meter, Disp: 90 each, Rfl: 1    losartan (COZAAR) 50 MG tablet, Take 1 tablet (50 mg total) by mouth once daily., Disp: 90 tablet, Rfl: 1    metFORMIN (GLUCOPHAGE-XR) 750 MG ER 24hr tablet, Take 1 tablet (750 mg total) by mouth 2 (two) times daily., Disp: 180 tablet, Rfl: 1    ONETOUCH DELICA PLUS LANCET 33 gauge Misc, Apply topically once daily., Disp: , Rfl:     ONETOUCH ULTRA2 METER Misc, USE TO CHECK GLUCOSE ONCE DAILY, Disp: , Rfl:     potassium chloride (MICRO-K) 10 MEQ CpSR, Take 3 capsules by mouth once daily for 180 doses, Disp: 270 capsule, Rfl: 1    semaglutide (OZEMPIC) 2 mg/dose (8 mg/3 mL) PnIj, Inject 2 mg into the skin every 7 days., Disp: 9 mL, Rfl: 3    simvastatin (ZOCOR) 10 MG tablet, Take 1 tablet (10 mg total) by mouth every evening., Disp: 90 tablet, Rfl: 1    traZODone (DESYREL) 50 MG tablet, Take 1 tablet (50 mg total) by mouth every evening., Disp: 90 tablet, Rfl: 1    Current Facility-Administered Medications:     influenza (Flulaval, Fluzone, Fluarix) 45 mcg/0.5 mL IM vaccine (> or = 6 mo) 0.5 mL, 0.5 mL, Intramuscular, 1 time in Clinic/HOD, Mendoza Hardy PA-C    Review of Systems   Skin:  Negative for pallor.   Neurological:  Negative for dizziness, tremors, seizures, speech difficulty and headaches.   Psychiatric/Behavioral:  Negative for confusion. The patient is not nervous/anxious.           Objective:      Vitals:    09/24/24 0833   BP: 138/78   Pulse: 68   Resp: 18   SpO2: 96%   Weight: 109.8 kg (242 lb)   Height: 5' 2" (1.575 m)   PF: (!) 0 " L/min     Physical Exam  Constitutional:       General: She is not in acute distress.     Appearance: She is well-developed.   HENT:      Head: Normocephalic and atraumatic.   Eyes:      Conjunctiva/sclera: Conjunctivae normal.      Pupils: Pupils are equal, round, and reactive to light.   Neck:      Thyroid: No thyromegaly.   Cardiovascular:      Rate and Rhythm: Normal rate and regular rhythm.      Heart sounds: Normal heart sounds.   Pulmonary:      Effort: Pulmonary effort is normal.      Breath sounds: Normal breath sounds.   Abdominal:      General: Bowel sounds are normal. There is no distension.      Palpations: Abdomen is soft.      Tenderness: There is no abdominal tenderness.   Musculoskeletal:         General: Normal range of motion.      Cervical back: Normal range of motion and neck supple.   Skin:     General: Skin is warm and dry.      Findings: No erythema.   Neurological:      Mental Status: She is alert and oriented to person, place, and time.      Cranial Nerves: No cranial nerve deficit.           Assessment:       1. Type 2 diabetes mellitus without complication, without long-term current use of insulin    2. Essential hypertension    3. Grief at loss of child    4. Peripheral polyneuropathy    5. Chronic right shoulder pain         Plan:       Type 2 diabetes mellitus without complication, without long-term current use of insulin  Comments:  a1c remains well controlled 6.7% and will continue as is..  Orders:  -     POCT HEMOGLOBIN A1C  -     simvastatin (ZOCOR) 10 MG tablet; Take 1 tablet (10 mg total) by mouth every evening.  Dispense: 90 tablet; Refill: 1  -     glipiZIDE (GLUCOTROL) 5 MG tablet; Take 1/2 tablet or 1 tablet every morning  Dispense: 90 tablet; Refill: 1  -     metFORMIN (GLUCOPHAGE-XR) 750 MG ER 24hr tablet; Take 1 tablet (750 mg total) by mouth 2 (two) times daily.  Dispense: 180 tablet; Refill: 1  -     Foot Exam Performed  -     influenza (Flulaval, Fluzone, Fluarix) 45  mcg/0.5 mL IM vaccine (> or = 6 mo) 0.5 mL  -     semaglutide (OZEMPIC) 2 mg/dose (8 mg/3 mL) PnIj; Inject 2 mg into the skin every 7 days.  Dispense: 9 mL; Refill: 3    Essential hypertension  Comments:  stable. continue as is  Orders:  -     atenoloL-chlorthalidone (TENORETIC) 50-25 mg Tab; Take 1 tablet by mouth once daily.  Dispense: 90 tablet; Refill: 1  -     losartan (COZAAR) 50 MG tablet; Take 1 tablet (50 mg total) by mouth once daily.  Dispense: 90 tablet; Refill: 1  -     potassium chloride (MICRO-K) 10 MEQ CpSR; Take 3 capsules by mouth once daily for 180 doses  Dispense: 270 capsule; Refill: 1    Grief at loss of child  -     traZODone (DESYREL) 50 MG tablet; Take 1 tablet (50 mg total) by mouth every evening.  Dispense: 90 tablet; Refill: 1    Peripheral polyneuropathy  Comments:  she is on gabapentin with decent effect. will consider increasing dose if needed.  Orders:  -     gabapentin (NEURONTIN) 100 MG capsule; TAKE 1 CAPSULE BY MOUTH IN THE EVENING  Dispense: 90 capsule; Refill: 1    Chronic right shoulder pain  Comments:  Neck and shoulder xrays for further evaluation ordered. Will prescribe muscle relaxant for pain.  Orders:  -     cyclobenzaprine (FLEXERIL) 5 MG tablet; Take 1 tablet (5 mg total) by mouth 3 (three) times daily as needed for Muscle spasms.  Dispense: 30 tablet; Refill: 0      Follow up in about 6 months (around 3/24/2025) for Annual Physical.        9/24/2024 Mendoza Haryd PA-C

## 2024-11-06 DIAGNOSIS — Z12.31 OTHER SCREENING MAMMOGRAM: ICD-10-CM

## 2024-11-08 ENCOUNTER — HOSPITAL ENCOUNTER (OUTPATIENT)
Dept: RADIOLOGY | Facility: HOSPITAL | Age: 64
Discharge: HOME | End: 2024-11-08
Attending: PHYSICIAN ASSISTANT
Payer: COMMERCIAL

## 2024-11-08 VITALS — WEIGHT: 242 LBS | HEIGHT: 62 IN | BODY MASS INDEX: 44.53 KG/M2

## 2024-11-08 DIAGNOSIS — Z12.31 OTHER SCREENING MAMMOGRAM: ICD-10-CM

## 2024-11-08 PROCEDURE — 77067 SCR MAMMO BI INCL CAD: CPT | Mod: 26,,, | Performed by: RADIOLOGY

## 2024-11-08 PROCEDURE — 77063 BREAST TOMOSYNTHESIS BI: CPT | Mod: 26,,, | Performed by: RADIOLOGY

## 2024-11-08 PROCEDURE — 77063 BREAST TOMOSYNTHESIS BI: CPT | Mod: TC,PO

## 2025-03-03 ENCOUNTER — TELEPHONE (OUTPATIENT)
Dept: FAMILY MEDICINE | Facility: CLINIC | Age: 65
End: 2025-03-03
Payer: COMMERCIAL

## 2025-03-03 DIAGNOSIS — I10 ESSENTIAL HYPERTENSION: ICD-10-CM

## 2025-03-03 DIAGNOSIS — E11.9 TYPE 2 DIABETES MELLITUS WITHOUT COMPLICATION, WITHOUT LONG-TERM CURRENT USE OF INSULIN: ICD-10-CM

## 2025-03-03 DIAGNOSIS — E78.1 HYPERTRIGLYCERIDEMIA: ICD-10-CM

## 2025-03-03 DIAGNOSIS — Z00.00 ROUTINE GENERAL MEDICAL EXAMINATION AT A HEALTH CARE FACILITY: ICD-10-CM

## 2025-03-03 DIAGNOSIS — Z79.899 ENCOUNTER FOR LONG-TERM (CURRENT) USE OF MEDICATIONS: Primary | ICD-10-CM

## 2025-03-08 LAB
ALBUMIN SERPL-MCNC: 4.5 G/DL (ref 3.6–5.1)
ALBUMIN/CREAT UR: 31 MG/G CREAT
ALBUMIN/GLOB SERPL: 1.6 (CALC) (ref 1–2.5)
ALP SERPL-CCNC: 105 U/L (ref 37–153)
ALT SERPL-CCNC: 19 U/L (ref 6–29)
APPEARANCE UR: CLEAR
AST SERPL-CCNC: 16 U/L (ref 10–35)
BACTERIA #/AREA URNS HPF: ABNORMAL /HPF
BACTERIA UR CULT: ABNORMAL
BACTERIA UR CULT: ABNORMAL
BASOPHILS # BLD AUTO: 46 CELLS/UL (ref 0–200)
BASOPHILS NFR BLD AUTO: 0.5 %
BILIRUB SERPL-MCNC: 0.4 MG/DL (ref 0.2–1.2)
BILIRUB UR QL STRIP: NEGATIVE
BUN SERPL-MCNC: 10 MG/DL (ref 7–25)
BUN/CREAT SERPL: ABNORMAL (CALC) (ref 6–22)
CALCIUM SERPL-MCNC: 10 MG/DL (ref 8.6–10.4)
CHLORIDE SERPL-SCNC: 93 MMOL/L (ref 98–110)
CHOLEST SERPL-MCNC: 187 MG/DL
CHOLEST/HDLC SERPL: 2.9 (CALC)
CO2 SERPL-SCNC: 28 MMOL/L (ref 20–32)
COLOR UR: YELLOW
CREAT SERPL-MCNC: 0.5 MG/DL (ref 0.5–1.05)
CREAT UR-MCNC: 49 MG/DL (ref 20–275)
EGFR: 105 ML/MIN/1.73M2
EOSINOPHIL # BLD AUTO: 267 CELLS/UL (ref 15–500)
EOSINOPHIL NFR BLD AUTO: 2.9 %
ERYTHROCYTE [DISTWIDTH] IN BLOOD BY AUTOMATED COUNT: 13.2 % (ref 11–15)
GLOBULIN SER CALC-MCNC: 2.8 G/DL (CALC) (ref 1.9–3.7)
GLUCOSE SERPL-MCNC: 139 MG/DL (ref 65–99)
GLUCOSE UR QL STRIP: NEGATIVE
HBA1C MFR BLD: 7.6 % OF TOTAL HGB
HCT VFR BLD AUTO: 44.9 % (ref 35–45)
HDLC SERPL-MCNC: 64 MG/DL
HGB BLD-MCNC: 15 G/DL (ref 11.7–15.5)
HGB UR QL STRIP: NEGATIVE
HYALINE CASTS #/AREA URNS LPF: ABNORMAL /LPF
KETONES UR QL STRIP: NEGATIVE
LDLC SERPL CALC-MCNC: 95 MG/DL (CALC)
LEUKOCYTE ESTERASE UR QL STRIP: ABNORMAL
LYMPHOCYTES # BLD AUTO: 2337 CELLS/UL (ref 850–3900)
LYMPHOCYTES NFR BLD AUTO: 25.4 %
MCH RBC QN AUTO: 29.4 PG (ref 27–33)
MCHC RBC AUTO-ENTMCNC: 33.4 G/DL (ref 32–36)
MCV RBC AUTO: 87.9 FL (ref 80–100)
MICROALBUMIN UR-MCNC: 1.5 MG/DL
MONOCYTES # BLD AUTO: 681 CELLS/UL (ref 200–950)
MONOCYTES NFR BLD AUTO: 7.4 %
NEUTROPHILS # BLD AUTO: 5870 CELLS/UL (ref 1500–7800)
NEUTROPHILS NFR BLD AUTO: 63.8 %
NITRITE UR QL STRIP: NEGATIVE
NONHDLC SERPL-MCNC: 123 MG/DL (CALC)
PH UR STRIP: 8.5 [PH] (ref 5–8)
PLATELET # BLD AUTO: 350 THOUSAND/UL (ref 140–400)
PMV BLD REES-ECKER: 9.7 FL (ref 7.5–12.5)
POTASSIUM SERPL-SCNC: 4 MMOL/L (ref 3.5–5.3)
PROT SERPL-MCNC: 7.3 G/DL (ref 6.1–8.1)
PROT UR QL STRIP: NEGATIVE
RBC # BLD AUTO: 5.11 MILLION/UL (ref 3.8–5.1)
RBC #/AREA URNS HPF: ABNORMAL /HPF
SERVICE CMNT-IMP: ABNORMAL
SODIUM SERPL-SCNC: 136 MMOL/L (ref 135–146)
SP GR UR STRIP: 1.01 (ref 1–1.03)
SQUAMOUS #/AREA URNS HPF: ABNORMAL /HPF
TRIGL SERPL-MCNC: 190 MG/DL
TSH SERPL-ACNC: 2.72 MIU/L (ref 0.4–4.5)
WBC # BLD AUTO: 9.2 THOUSAND/UL (ref 3.8–10.8)
WBC #/AREA URNS HPF: ABNORMAL /HPF

## 2025-03-18 ENCOUNTER — TELEPHONE (OUTPATIENT)
Dept: FAMILY MEDICINE | Facility: CLINIC | Age: 65
End: 2025-03-18
Payer: COMMERCIAL

## 2025-03-25 ENCOUNTER — OFFICE VISIT (OUTPATIENT)
Dept: FAMILY MEDICINE | Facility: CLINIC | Age: 65
End: 2025-03-25
Payer: COMMERCIAL

## 2025-03-25 VITALS
RESPIRATION RATE: 18 BRPM | HEIGHT: 62 IN | HEART RATE: 68 BPM | WEIGHT: 243 LBS | DIASTOLIC BLOOD PRESSURE: 72 MMHG | BODY MASS INDEX: 44.72 KG/M2 | SYSTOLIC BLOOD PRESSURE: 138 MMHG | OXYGEN SATURATION: 96 %

## 2025-03-25 DIAGNOSIS — G89.29 CHRONIC RIGHT SHOULDER PAIN: ICD-10-CM

## 2025-03-25 DIAGNOSIS — G62.9 PERIPHERAL POLYNEUROPATHY: ICD-10-CM

## 2025-03-25 DIAGNOSIS — E11.9 TYPE 2 DIABETES MELLITUS WITHOUT COMPLICATION, WITHOUT LONG-TERM CURRENT USE OF INSULIN: ICD-10-CM

## 2025-03-25 DIAGNOSIS — F43.21 GRIEF AT LOSS OF CHILD: ICD-10-CM

## 2025-03-25 DIAGNOSIS — E11.42 DIABETIC POLYNEUROPATHY ASSOCIATED WITH TYPE 2 DIABETES MELLITUS: ICD-10-CM

## 2025-03-25 DIAGNOSIS — E66.01 SEVERE OBESITY: Primary | ICD-10-CM

## 2025-03-25 DIAGNOSIS — I10 ESSENTIAL HYPERTENSION: ICD-10-CM

## 2025-03-25 DIAGNOSIS — Z01.419 WELL WOMAN EXAM: ICD-10-CM

## 2025-03-25 DIAGNOSIS — Z63.4 GRIEF AT LOSS OF CHILD: ICD-10-CM

## 2025-03-25 DIAGNOSIS — M25.511 CHRONIC RIGHT SHOULDER PAIN: ICD-10-CM

## 2025-03-25 PROCEDURE — 3008F BODY MASS INDEX DOCD: CPT | Mod: CPTII,S$GLB,, | Performed by: PHYSICIAN ASSISTANT

## 2025-03-25 PROCEDURE — 3075F SYST BP GE 130 - 139MM HG: CPT | Mod: CPTII,S$GLB,, | Performed by: PHYSICIAN ASSISTANT

## 2025-03-25 PROCEDURE — 4010F ACE/ARB THERAPY RXD/TAKEN: CPT | Mod: CPTII,S$GLB,, | Performed by: PHYSICIAN ASSISTANT

## 2025-03-25 PROCEDURE — 3051F HG A1C>EQUAL 7.0%<8.0%: CPT | Mod: CPTII,S$GLB,, | Performed by: PHYSICIAN ASSISTANT

## 2025-03-25 PROCEDURE — 99396 PREV VISIT EST AGE 40-64: CPT | Mod: S$GLB,,, | Performed by: PHYSICIAN ASSISTANT

## 2025-03-25 PROCEDURE — 3078F DIAST BP <80 MM HG: CPT | Mod: CPTII,S$GLB,, | Performed by: PHYSICIAN ASSISTANT

## 2025-03-25 PROCEDURE — 3060F POS MICROALBUMINURIA REV: CPT | Mod: CPTII,S$GLB,, | Performed by: PHYSICIAN ASSISTANT

## 2025-03-25 PROCEDURE — 3066F NEPHROPATHY DOC TX: CPT | Mod: CPTII,S$GLB,, | Performed by: PHYSICIAN ASSISTANT

## 2025-03-25 PROCEDURE — 1159F MED LIST DOCD IN RCRD: CPT | Mod: CPTII,S$GLB,, | Performed by: PHYSICIAN ASSISTANT

## 2025-03-25 RX ORDER — SIMVASTATIN 10 MG/1
10 TABLET, FILM COATED ORAL NIGHTLY
Qty: 90 TABLET | Refills: 1 | Status: SHIPPED | OUTPATIENT
Start: 2025-03-25 | End: 2026-03-25

## 2025-03-25 RX ORDER — LOSARTAN POTASSIUM 50 MG/1
50 TABLET ORAL DAILY
Qty: 90 TABLET | Refills: 1 | Status: SHIPPED | OUTPATIENT
Start: 2025-03-25 | End: 2026-03-25

## 2025-03-25 RX ORDER — TRAZODONE HYDROCHLORIDE 50 MG/1
50 TABLET ORAL NIGHTLY
Qty: 90 TABLET | Refills: 1 | Status: SHIPPED | OUTPATIENT
Start: 2025-03-25 | End: 2025-09-21

## 2025-03-25 RX ORDER — POTASSIUM CHLORIDE 750 MG/1
CAPSULE, EXTENDED RELEASE ORAL
Qty: 270 CAPSULE | Refills: 1 | Status: SHIPPED | OUTPATIENT
Start: 2025-03-25

## 2025-03-25 RX ORDER — METFORMIN HYDROCHLORIDE 750 MG/1
750 TABLET, EXTENDED RELEASE ORAL 2 TIMES DAILY
Qty: 180 TABLET | Refills: 1 | Status: SHIPPED | OUTPATIENT
Start: 2025-03-25 | End: 2025-09-21

## 2025-03-25 RX ORDER — CYCLOBENZAPRINE HCL 10 MG
10 TABLET ORAL 3 TIMES DAILY PRN
Qty: 30 TABLET | Refills: 2 | Status: SHIPPED | OUTPATIENT
Start: 2025-03-25 | End: 2025-06-23

## 2025-03-25 RX ORDER — GLIPIZIDE 5 MG/1
5 TABLET ORAL 2 TIMES DAILY WITH MEALS
Qty: 90 TABLET | Refills: 1 | Status: SHIPPED | OUTPATIENT
Start: 2025-03-25

## 2025-03-25 RX ORDER — ATENOLOL AND CHLORTHALIDONE TABLET 50; 25 MG/1; MG/1
1 TABLET ORAL DAILY
Qty: 90 TABLET | Refills: 1 | Status: SHIPPED | OUTPATIENT
Start: 2025-03-25 | End: 2025-09-21

## 2025-03-25 RX ORDER — SEMAGLUTIDE 2.68 MG/ML
2 INJECTION, SOLUTION SUBCUTANEOUS
Qty: 9 ML | Refills: 3 | Status: SHIPPED | OUTPATIENT
Start: 2025-03-25 | End: 2026-03-25

## 2025-03-25 RX ORDER — GABAPENTIN 100 MG/1
CAPSULE ORAL
Qty: 90 CAPSULE | Refills: 1 | Status: SHIPPED | OUTPATIENT
Start: 2025-03-25

## 2025-03-25 SDOH — SOCIAL DETERMINANTS OF HEALTH (SDOH): DISSAPEARANCE AND DEATH OF FAMILY MEMBER: Z63.4

## 2025-03-25 NOTE — PROGRESS NOTES
SUBJECTIVE:    Patient ID: Angelina Jennings is a 64 y.o. female.    Chief Complaint: Annual Exam (Brought bottles// refills needed//lab results // pt states she have no complaints today//pt refused eye exam //agrees to pap smear )    History of Present Illness    CHIEF COMPLAINT:  Angelina presents today for follow up of diabetes management    DIABETES:  She reports her A1c has increased to 7.6 from 6.7 in September. Her fasting blood sugar was 139. She continues Ozempic 2mg, Metformin twice daily, and Glipizide 5mg daily.    NEUROPATHY SYMPTOMS:  She experiences numbness and tingling in one leg, specifically at the bottom, during evening hours. She notes occasional swelling in the affected leg, primarily from the lower portion downward. By morning, the symptoms improve but do not completely resolve. She denies any significant impact on her daily activities.    MEDICATIONS:  She takes statin every other day due to previous side effects with daily use. She uses Flexeril 5mg as needed for bowling-related back pain, reporting inadequate relief at current dosage.      ROS:  General: -fever, -chills, -fatigue, -weight gain, -weight loss  Eyes: -vision changes, -redness, -discharge  ENT: -ear pain, -nasal congestion, -sore throat  Cardiovascular: -chest pain, -palpitations, +lower extremity edema  Respiratory: -cough, -shortness of breath  Gastrointestinal: -abdominal pain, -nausea, -vomiting, -diarrhea, -constipation, -blood in stool  Genitourinary: -dysuria, -hematuria, -frequency  Musculoskeletal: -joint pain, -muscle pain  Skin: -rash, -lesion  Neurological: -headache, -dizziness, +numbness, +tingling  Psychiatric: -anxiety, -depression, -sleep difficulty         Telephone on 03/03/2025   Component Date Value Ref Range Status    Color,  03/07/2025 YELLOW  YELLOW Final    Appearance, UA 03/07/2025 CLEAR  CLEAR Final    Specific Gravity,  03/07/2025 1.012  1.001 - 1.035 Final    pH,  03/07/2025 8.5 (H)  5.0 - 8.0 Final     Glucose, UA 03/07/2025 NEGATIVE  NEGATIVE Final    Bilirubin, UA 03/07/2025 NEGATIVE  NEGATIVE Final    Ketones, UA 03/07/2025 NEGATIVE  NEGATIVE Final    Occult Blood UA 03/07/2025 NEGATIVE  NEGATIVE Final    Protein, UA 03/07/2025 NEGATIVE  NEGATIVE Final    Nitrite, UA 03/07/2025 NEGATIVE  NEGATIVE Final    Leukocytes, UA 03/07/2025 TRACE (A)  NEGATIVE Final    WBC Casts, UA 03/07/2025 NONE SEEN  < OR = 5 /HPF Final    RBC Casts, UA 03/07/2025 NONE SEEN  < OR = 2 /HPF Final    Squam Epithel, UA 03/07/2025 6-10 (A)  < OR = 5 /HPF Final    Bacteria, UA 03/07/2025 NONE SEEN  NONE SEEN /HPF Final    Hyaline Casts, UA 03/07/2025 NONE SEEN  NONE SEEN /LPF Final    Service Cmt: 03/07/2025 SEE COMMENT   Final    Reflexive Urine Culture 03/07/2025 SEE COMMENT   Final    Urine Culture, Routine 03/07/2025 SEE COMMENT   Final    TSH w/reflex to FT4 03/07/2025 2.72  0.40 - 4.50 mIU/L Final    Cholesterol 03/07/2025 187  <200 mg/dL Final    HDL 03/07/2025 64  > OR = 50 mg/dL Final    Triglycerides 03/07/2025 190 (H)  <150 mg/dL Final    LDL Cholesterol 03/07/2025 95  mg/dL (calc) Final    HDL/Cholesterol Ratio 03/07/2025 2.9  <5.0 (calc) Final    Non HDL Chol. (LDL+VLDL) 03/07/2025 123  <130 mg/dL (calc) Final    WBC 03/07/2025 9.2  3.8 - 10.8 Thousand/uL Final    RBC 03/07/2025 5.11 (H)  3.80 - 5.10 Million/uL Final    Hemoglobin 03/07/2025 15.0  11.7 - 15.5 g/dL Final    Hematocrit 03/07/2025 44.9  35.0 - 45.0 % Final    MCV 03/07/2025 87.9  80.0 - 100.0 fL Final    MCH 03/07/2025 29.4  27.0 - 33.0 pg Final    MCHC 03/07/2025 33.4  32.0 - 36.0 g/dL Final    RDW 03/07/2025 13.2  11.0 - 15.0 % Final    Platelets 03/07/2025 350  140 - 400 Thousand/uL Final    MPV 03/07/2025 9.7  7.5 - 12.5 fL Final    Neutrophils, Abs 03/07/2025 5,870  1,500 - 7,800 cells/uL Final    Lymph # 03/07/2025 2,337  850 - 3,900 cells/uL Final    Mono # 03/07/2025 681  200 - 950 cells/uL Final    Eos # 03/07/2025 267  15 - 500 cells/uL Final    Baso  # 03/07/2025 46  0 - 200 cells/uL Final    Neutrophils Relative 03/07/2025 63.8  % Final    Lymph % 03/07/2025 25.4  % Final    Mono % 03/07/2025 7.4  % Final    Eosinophil % 03/07/2025 2.9  % Final    Basophil % 03/07/2025 0.5  % Final    Glucose 03/07/2025 139 (H)  65 - 99 mg/dL Final    BUN 03/07/2025 10  7 - 25 mg/dL Final    Creatinine 03/07/2025 0.50  0.50 - 1.05 mg/dL Final    eGFR 03/07/2025 105  > OR = 60 mL/min/1.73m2 Final    BUN/Creatinine Ratio 03/07/2025 SEE NOTE:  6 - 22 (calc) Final    Sodium 03/07/2025 136  135 - 146 mmol/L Final    Potassium 03/07/2025 4.0  3.5 - 5.3 mmol/L Final    Chloride 03/07/2025 93 (L)  98 - 110 mmol/L Final    CO2 03/07/2025 28  20 - 32 mmol/L Final    Calcium 03/07/2025 10.0  8.6 - 10.4 mg/dL Final    Total Protein 03/07/2025 7.3  6.1 - 8.1 g/dL Final    Albumin 03/07/2025 4.5  3.6 - 5.1 g/dL Final    Globulin, Total 03/07/2025 2.8  1.9 - 3.7 g/dL (calc) Final    Albumin/Globulin Ratio 03/07/2025 1.6  1.0 - 2.5 (calc) Final    Total Bilirubin 03/07/2025 0.4  0.2 - 1.2 mg/dL Final    Alkaline Phosphatase 03/07/2025 105  37 - 153 U/L Final    AST 03/07/2025 16  10 - 35 U/L Final    ALT 03/07/2025 19  6 - 29 U/L Final    Creatinine, Urine 03/07/2025 49  20 - 275 mg/dL Final    Microalb, Ur 03/07/2025 1.5  See Note: mg/dL Final    Microalb/Creat Ratio 03/07/2025 31 (H)  <30 mg/g creat Final    Hemoglobin A1C 03/07/2025 7.6 (H)  <5.7 % of total Hgb Final       Past Medical History:   Diagnosis Date    Diabetes mellitus, type 2     Hypertension      Past Surgical History:   Procedure Laterality Date    COLONOSCOPY  2022    Dr. Stacy-RTC 5 years    TONSILLECTOMY       Family History   Problem Relation Name Age of Onset    Early death Mother *     Heart disease Father *     Breast cancer Maternal Aunt * 75    Cancer Maternal Aunt *     Diabetes Maternal Grandfather *     Cancer Paternal Grandmother *     Heart disease Paternal Grandfather *        Marital Status:   Alcohol  "History:  reports current alcohol use.  Tobacco History:  reports that she quit smoking about 11 years ago. Her smoking use included cigarettes. She started smoking about 46 years ago. She has a 52.5 pack-year smoking history. She has been exposed to tobacco smoke. She has never used smokeless tobacco.  Drug History:  reports no history of drug use.    Review of patient's allergies indicates:  No Known Allergies  Current Medications[1]    Objective:      Vitals:    03/25/25 0814   BP: 138/72   Pulse: 68   Resp: 18   SpO2: 96%   Weight: 110.2 kg (243 lb)   Height: 5' 2" (1.575 m)     Physical Exam    Vitals: Weight down about 10 lbs. Blood pressure: 138/72.  General: No acute distress. Well-developed. Well-nourished.  Eyes: EOMI. Sclerae anicteric.  HENT: Normocephalic. Atraumatic. Nares patent. Moist oral mucosa.  Ears: Bilateral TMs clear. Bilateral EACs clear.  Cardiovascular: Regular rate. Regular rhythm. No murmurs. No rubs. No gallops. Normal S1, S2.  Respiratory: Normal respiratory effort. Clear to auscultation bilaterally. No rales. No rhonchi. No wheezing.  Abdomen: Soft. Non-tender. Non-distended. Normoactive bowel sounds.  Musculoskeletal: No  obvious deformity.  Extremities: No lower extremity edema.  Neurological: Alert & oriented x3. No slurred speech. Normal gait.  Psychiatric: Normal mood. Normal affect. Good insight. Good judgment.  Skin: Warm. Dry. No rash.         Assessment:       Assessment & Plan    - A1c increased from 6.7 in September to 7.6 today, likely due to holiday season and reduced activity.  - Microalbumin slightly elevated, indicating mild kidney strain from elevated blood sugar; expected to normalize with improved glycemic control.  - Liver function, blood counts, and thyroid tests WNL.  - Cholesterol slightly elevated but still relatively well-managed; LDL increased from 80 to 95.  - Leg numbness and tingling likely due to low back degenerative arthritis or possible diabetic " neuropathy.    DIABETIC POLYNEUROPATHY ASSOCIATED WITH TYPE 2 DIABETES MELLITUS:  - Increased Ozempic to 2 mg and Glipizide to 5mg twice daily due to rising A1c (from 6.7 in September to 7.6 currently) and fasting glucose at 139.  - Current medications include Metformin twice daily.  - Angelina reports numbness and tingling in one leg at night, improving but not resolving by morning, suggesting possible diabetic neuropathy.  - Borderline high microalbumin indicates potential kidney strain due to hyperglycemia.  - Recommend increased physical activity and dietary modifications to improve glycemic control.  - Plan to reassess A1c and leg symptoms in 3-4 months.  - Advised patient to contact the office if leg numbness/tingling rapidly worsens or becomes limiting.  - Consider more aggressive treatment or imaging if symptoms persist and A1c does not improve.    SEVERE OBESITY:  - Angelina's weight has been trending down over the last year, with approximately 10 lbs lost, which is evaluated as positive progress.  - Continued Ozempic 2mg, which can aid in weight loss for patients with obesity.  - Recommend continued focus on physical activity and dietary modifications to support ongoing weight loss efforts.    HYPERLIPIDEMIA:  - Continue statin at current dose of every other day.  - LDL cholesterol has increased from 80 to 95 since last year.  - Angelina is tolerating the current statin medication regimen well.  - Recommend increased physical activity and dietary management to help reduce cholesterol levels.    LOW BACK PAIN:  - Increase Flexeril (cyclobenzaprine) from 5 mg to 10 mg as needed for back pain.  - Instruct patient to return to 5 mg or cut pills in half if excessive sedation occurs.  - Suspect degenerative arthritis in the lower back, possibly causing neuropathy.  - Consider imaging of the back if symptoms persist or worsen.    DIABETES MANAGEMENT:  - Continue Metformin at current dose.  - A1c has increased from 6.7 in  September to 7.6 currently, with fasting glucose at 139.  - Kidney function remains good with creatinine at 0.5 and eGFR at 105.    FOLLOW-UP:  - Reassess in 3 months, including A1c.  - Expect improvement with medication adjustment and lifestyle modifications.       Plan:       Severe obesity    Type 2 diabetes mellitus without complication, without long-term current use of insulin  Comments:  a1c remains well controlled 6.7% and will continue as is..  Orders:  -     semaglutide (OZEMPIC) 2 mg/dose (8 mg/3 mL) PnIj; Inject 2 mg into the skin every 7 days.  Dispense: 9 mL; Refill: 3  -     simvastatin (ZOCOR) 10 MG tablet; Take 1 tablet (10 mg total) by mouth every evening.  Dispense: 90 tablet; Refill: 1  -     metFORMIN (GLUCOPHAGE-XR) 750 MG ER 24hr tablet; Take 1 tablet (750 mg total) by mouth 2 (two) times daily.  Dispense: 180 tablet; Refill: 1  -     glipiZIDE (GLUCOTROL) 5 MG tablet; Take 1 tablet (5 mg total) by mouth 2 (two) times daily with meals.  Dispense: 90 tablet; Refill: 1    Essential hypertension  Comments:  stable. continue as is  Orders:  -     atenoloL-chlorthalidone (TENORETIC) 50-25 mg Tab; Take 1 tablet by mouth once daily.  Dispense: 90 tablet; Refill: 1  -     losartan (COZAAR) 50 MG tablet; Take 1 tablet (50 mg total) by mouth once daily.  Dispense: 90 tablet; Refill: 1  -     potassium chloride (MICRO-K) 10 MEQ CpSR; Take 3 capsules by mouth once daily for 180 doses  Dispense: 270 capsule; Refill: 1    Grief at loss of child  -     traZODone (DESYREL) 50 MG tablet; Take 1 tablet (50 mg total) by mouth every evening.  Dispense: 90 tablet; Refill: 1    Peripheral polyneuropathy  Comments:  she is on gabapentin with decent effect. will consider increasing dose if needed.  Orders:  -     gabapentin (NEURONTIN) 100 MG capsule; TAKE 1 CAPSULE BY MOUTH IN THE EVENING  Dispense: 90 capsule; Refill: 1    Diabetic polyneuropathy associated with type 2 diabetes mellitus    Chronic right shoulder  pain  Comments:  Neck and shoulder xrays for further evaluation ordered. Will prescribe muscle relaxant for pain.  Orders:  -     cyclobenzaprine (FLEXERIL) 10 MG tablet; Take 1 tablet (10 mg total) by mouth 3 (three) times daily as needed for Muscle spasms.  Dispense: 30 tablet; Refill: 2    Well woman exam  -     Ambulatory referral/consult to Obstetrics / Gynecology; Future; Expected date: 04/01/2025      Follow up in about 4 months (around 7/25/2025) for Diabetic Check-Up.    This note was generated with the assistance of ambient listening technology. Verbal consent was obtained by the patient and accompanying visitor(s) for the recording of patient appointment to facilitate this note. I attest to having reviewed and edited the generated note for accuracy, though some syntax or spelling errors may persist. Please contact the author of this note for any clarification.          3/25/2025 Mendoza Hardy PA-C      Answers submitted by the patient for this visit:  Review of Systems Questionnaire (Submitted on 3/18/2025)  activity change: No  unexpected weight change: No  neck pain: No  hearing loss: No  rhinorrhea: No  trouble swallowing: No  eye discharge: No  visual disturbance: No  chest tightness: No  wheezing: No  chest pain: No  palpitations: No  blood in stool: No  constipation: No  vomiting: No  diarrhea: No  polydipsia: No  polyuria: No  difficulty urinating: No  hematuria: No  menstrual problem: No  dysuria: No  joint swelling: No  arthralgias: No  headaches: No  weakness: No  confusion: No  dysphoric mood: No         [1]   Current Outpatient Medications:     ascorbic acid, vitamin C, (VITAMIN C) 500 MG tablet, Take 500 mg by mouth once daily., Disp: , Rfl:     aspirin 81 MG Chew, Take 81 mg by mouth once daily. , Disp: , Rfl:     triamcinolone acetonide 0.1% (KENALOG) 0.1 % cream, Apply topically 2 (two) times daily. To ear eczema, Disp: 30 g, Rfl: 1    zinc gluconate 50 mg tablet, Take 50 mg by mouth  once daily., Disp: , Rfl:     atenoloL-chlorthalidone (TENORETIC) 50-25 mg Tab, Take 1 tablet by mouth once daily., Disp: 90 tablet, Rfl: 1    b complex vitamins tablet, Take 1 tablet by mouth once daily. (Patient not taking: Reported on 3/25/2025), Disp: , Rfl:     blood sugar diagnostic (ONETOUCH ULTRA BLUE TEST STRIP) Strp, 30 strips by In Vitro route once daily. as directed, Disp: 30 strip, Rfl: 11    blood sugar diagnostic Strp, To check BG 1 time daily, to use with insurance preferred meter, Disp: 200 each, Rfl: 1    blood sugar diagnostic Strp, To check BG 2 times daily, to use with insurance preferred meter, Disp: 100 strip, Rfl: 1    cyclobenzaprine (FLEXERIL) 10 MG tablet, Take 1 tablet (10 mg total) by mouth 3 (three) times daily as needed for Muscle spasms., Disp: 30 tablet, Rfl: 2    gabapentin (NEURONTIN) 100 MG capsule, TAKE 1 CAPSULE BY MOUTH IN THE EVENING, Disp: 90 capsule, Rfl: 1    glipiZIDE (GLUCOTROL) 5 MG tablet, Take 1 tablet (5 mg total) by mouth 2 (two) times daily with meals., Disp: 90 tablet, Rfl: 1    lancets Misc, To check BG 2 times daily, to use with insurance preferred meter, Disp: 90 each, Rfl: 1    losartan (COZAAR) 50 MG tablet, Take 1 tablet (50 mg total) by mouth once daily., Disp: 90 tablet, Rfl: 1    metFORMIN (GLUCOPHAGE-XR) 750 MG ER 24hr tablet, Take 1 tablet (750 mg total) by mouth 2 (two) times daily., Disp: 180 tablet, Rfl: 1    ONETOUCH DELICA PLUS LANCET 33 gauge Misc, Apply topically once daily., Disp: , Rfl:     ONETOUCH ULTRA2 METER Misc, USE TO CHECK GLUCOSE ONCE DAILY, Disp: , Rfl:     potassium chloride (MICRO-K) 10 MEQ CpSR, Take 3 capsules by mouth once daily for 180 doses, Disp: 270 capsule, Rfl: 1    semaglutide (OZEMPIC) 2 mg/dose (8 mg/3 mL) PnIj, Inject 2 mg into the skin every 7 days., Disp: 9 mL, Rfl: 3    simvastatin (ZOCOR) 10 MG tablet, Take 1 tablet (10 mg total) by mouth every evening., Disp: 90 tablet, Rfl: 1    traZODone (DESYREL) 50 MG tablet,  Take 1 tablet (50 mg total) by mouth every evening., Disp: 90 tablet, Rfl: 1

## 2025-04-11 ENCOUNTER — OFFICE VISIT (OUTPATIENT)
Dept: OBSTETRICS AND GYNECOLOGY | Facility: CLINIC | Age: 65
End: 2025-04-11
Payer: COMMERCIAL

## 2025-04-11 VITALS
WEIGHT: 244.69 LBS | HEART RATE: 82 BPM | DIASTOLIC BLOOD PRESSURE: 74 MMHG | HEIGHT: 62 IN | OXYGEN SATURATION: 98 % | BODY MASS INDEX: 45.03 KG/M2 | SYSTOLIC BLOOD PRESSURE: 132 MMHG

## 2025-04-11 DIAGNOSIS — Z01.419 WELL WOMAN EXAM WITH ROUTINE GYNECOLOGICAL EXAM: Primary | ICD-10-CM

## 2025-04-11 DIAGNOSIS — Z12.4 PAP SMEAR FOR CERVICAL CANCER SCREENING: ICD-10-CM

## 2025-04-11 PROCEDURE — 99999 PR PBB SHADOW E&M-EST. PATIENT-LVL IV: CPT | Mod: PBBFAC,,, | Performed by: FAMILY MEDICINE

## 2025-04-11 NOTE — PROGRESS NOTES
HISTORY OF THE PRESENT ILLNESS    2025  Angelina Jennings is a 64 y.o. here for Annual Exam  She presents today for annual exam without complaints.    G'sP's:   LMP: No LMP recorded. Patient is postmenopausal.  Relationship:   Contraception:      PAP'S: remote h/o abnormal & cleared to routine surveillance  PAP: PAP neg / Date: 2-3 years ago    Last Mammogram: 2024 Results:  negative    HRT: denies   BLEEDING: denies    Last colonoscopy: 3/5/2022     LABS & RADS   Lab Results   Component Value Date    WBC 9.2 2025    HGB 15.0 2025    HCT 44.9 2025     2025    MCV 87.9 2025      Lab Results   Component Value Date    TSH 2.37 2021      Lab Results   Component Value Date    LABURIN SEE COMMENT 2025        GYNECOLOGIC HISTORY  Abnormal pap 20 years ago and repeat was normal and negative since    Age of Menarche:16  Age at first pregnancy:    Age at first live birth:20  Age at Menopause:50     OBSTETRIC HISTORY  OB History    Para Term  AB Living   2 2 2      SAB IAB Ectopic Multiple Live Births             # Outcome Date GA Lbr Andrade/2nd Weight Sex Type Anes PTL Lv   2 Term            1 Term                PAST MEDICAL HISTORY  -------------------------------------    Diabetes mellitus, type 2    Hypertension         PAST SURGICAL HISTORY  ----------------------------    Colonoscopy    Dr. [facility] years    Tonsillectomy         ALLERGIES  Review of patient's allergies indicates:  No Known Allergies    MEDICATIONS  Current Outpatient Medications   Medication Instructions    ascorbic acid (vitamin C) (VITAMIN C) 500 mg, Daily    aspirin 81 mg, Daily    atenoloL-chlorthalidone (TENORETIC) 50-25 mg Tab 1 tablet, Oral, Daily    b complex vitamins tablet 1 tablet, Daily    blood sugar diagnostic (ONETOUCH ULTRA BLUE TEST STRIP) Strp 30 strips, In Vitro, Daily, as directed    blood sugar diagnostic Strp To check BG 1 time daily, to use  "with insurance preferred meter    blood sugar diagnostic Strp To check BG 2 times daily, to use with insurance preferred meter    cyclobenzaprine (FLEXERIL) 10 mg, Oral, 3 times daily PRN    gabapentin (NEURONTIN) 100 MG capsule TAKE 1 CAPSULE BY MOUTH IN THE EVENING    glipiZIDE (GLUCOTROL) 5 mg, Oral, 2 times daily with meals    lancets Misc To check BG 2 times daily, to use with insurance preferred meter    losartan (COZAAR) 50 mg, Oral, Daily    metFORMIN (GLUCOPHAGE-XR) 750 mg, Oral, 2 times daily    ONETOUCH DELICA PLUS LANCET 33 gauge Misc Daily    ONETOUCH ULTRA2 METER Misc USE TO CHECK GLUCOSE ONCE DAILY    OZEMPIC 2 mg, Subcutaneous, Every 7 days    potassium chloride (MICRO-K) 10 MEQ CpSR Take 3 capsules by mouth once daily for 180 doses     simvastatin (ZOCOR) 10 mg, Oral, Nightly    traZODone (DESYREL) 50 mg, Oral, Nightly    triamcinolone acetonide 0.1% (KENALOG) 0.1 % cream Topical (Top), 2 times daily, To ear eczema    zinc gluconate 50 mg, Daily       SOCIAL HISTORY  Social History[1]   Lives with:   Domestic Violence: no  Occupation:  [employer]      FAMILY HISTORY  BLEEDING or  CLOTTING DISORDERS: none  BREAST CA: maternal aunt  UTERINE CA: none  OVARIAN CA: none  COLON CA: none    REVIEW OF SYSTEMS  Review of Systems   Constitutional:  Negative for activity change, appetite change and fever.   Respiratory:  Negative for cough and shortness of breath.    Genitourinary:  Negative for dysuria, flank pain, frequency, pelvic pain, urgency and urinary incontinence.   Psychiatric/Behavioral:  Negative for depression.      --------------------------------------------------------------------------------------------------------------    PHYSICAL EXAM  VITALS:  Vitals:    04/11/25 1017   BP: 132/74   BP Location: Right arm   Patient Position: Sitting   Pulse: 82   SpO2: 98%   Weight: 111 kg (244 lb 11.4 oz)   Height: 5' 2" (1.575 m)     Exam conducted with a chaperone present. "     Physical Exam:   Constitutional: She is oriented to person, place, and time. She appears well-developed and well-nourished.    HENT:   Head: Normocephalic.    Eyes: Pupils are equal, round, and reactive to light. Conjunctivae and EOM are normal.      Pulmonary/Chest: Effort normal.          Genitourinary:    Inguinal canal, vagina, uterus, right adnexa, left adnexa and rectum normal.      Pelvic exam was performed with patient in the lithotomy position.   The external female genitalia was normal.   Genitalia hair distrobution normal .   Cervix is normal. Vagina was moist.   pap smear completedUterus consistancy normal and Uerus contour normal            Musculoskeletal: Normal range of motion and moves all extremeties.       Neurological: She is alert and oriented to person, place, and time.    Skin: Skin is warm and dry.    Psychiatric: She has a normal mood and affect.          ASSESSMENT AND PLAN:  Angelina Jennings 64 y.o.   Angelina was seen today for annual exam.    Diagnoses and all orders for this visit:    Well woman exam with routine gynecological exam  -     Liquid-Based Pap Smear, Screening    Pap smear for cervical cancer screening  -     Liquid-Based Pap Smear, Screening        Cervical Cancer screening: f/u results of PAP / HPV --> if both negative then can discontinue PAP tests based on age and history (still recommend periodic pelvic exam)  HPV Vaccine: n/a (>45)    Mammogram: up to date  From a gynecologic standpoint the patient is currently doing well without complaints.     Patient was counseled today on :  Pap guidelines  Recommend periodic pelvic exams with visual inspection and palpation  mammograms starting annually at age 40  Encouraged self breast awareness; RTC for breast concerns  Colonoscopy after the age of 50  Dexa Bone Scan and calcium and vitamin D supplementation in menopause and to see her PCP for other health maintenance.     RTC for periodic GYN exam, sooner prn      Destiny T  Griffing     Follow up if symptoms worsen or fail to improve.   Future Appointments   Date Time Provider Department Center   2025  9:20 AM Mendoza Hardy PA-C [facility]         Tests to Keep You Healthy    Mammogram: Met on 2024  Eye Exam: DUE  Colon Cancer Screening: Met on 3/25/2022  Cervical Cancer Screening: ORDERED  Last Blood Pressure <= 139/89 (3/25/2025): Yes  Last HbA1c < 8 (2025): Yes            [1]   Social History  Tobacco Use    Smoking status: Former     Current packs/day: 0.00     Average packs/day: 1.5 packs/day for 35.0 years (52.5 ttl pk-yrs)     Types: Cigarettes     Start date: 1978     Quit date: 2013     Years since quittin.6     Passive exposure: Past    Smokeless tobacco: Never   Substance Use Topics    Alcohol use: Yes     Alcohol/week: 0.0 standard drinks of alcohol     Comment: rarely    Drug use: Never

## 2025-04-22 ENCOUNTER — RESULTS FOLLOW-UP (OUTPATIENT)
Dept: OBSTETRICS AND GYNECOLOGY | Facility: CLINIC | Age: 65
End: 2025-04-22

## 2025-05-12 NOTE — PROGRESS NOTES
Pap smear shows no changes/malignancy, HPV testing is negative , can discontinue PAP tests based on age and history.  Periodic pelvic exams with visual inspection and palpation is recommended every 1-2 years.

## 2025-06-27 LAB
LEFT EYE DM RETINOPATHY: NEGATIVE
RIGHT EYE DM RETINOPATHY: NEGATIVE

## 2025-07-07 DIAGNOSIS — E11.9 TYPE 2 DIABETES MELLITUS WITHOUT COMPLICATION, WITHOUT LONG-TERM CURRENT USE OF INSULIN: ICD-10-CM

## 2025-07-07 RX ORDER — GLIPIZIDE 5 MG/1
5 TABLET ORAL 2 TIMES DAILY WITH MEALS
Qty: 90 TABLET | Refills: 1 | OUTPATIENT
Start: 2025-07-07

## 2025-07-07 RX ORDER — GLIPIZIDE 5 MG/1
5 TABLET ORAL 2 TIMES DAILY WITH MEALS
Qty: 180 TABLET | Refills: 1 | Status: SHIPPED | OUTPATIENT
Start: 2025-07-07

## 2025-07-24 ENCOUNTER — PATIENT OUTREACH (OUTPATIENT)
Dept: ADMINISTRATIVE | Facility: HOSPITAL | Age: 65
End: 2025-07-24
Payer: COMMERCIAL

## 2025-07-25 ENCOUNTER — OFFICE VISIT (OUTPATIENT)
Dept: FAMILY MEDICINE | Facility: CLINIC | Age: 65
End: 2025-07-25
Payer: COMMERCIAL

## 2025-07-25 VITALS
WEIGHT: 244.63 LBS | HEIGHT: 62 IN | SYSTOLIC BLOOD PRESSURE: 124 MMHG | DIASTOLIC BLOOD PRESSURE: 70 MMHG | BODY MASS INDEX: 45.02 KG/M2 | OXYGEN SATURATION: 96 % | HEART RATE: 70 BPM

## 2025-07-25 DIAGNOSIS — I10 ESSENTIAL HYPERTENSION: ICD-10-CM

## 2025-07-25 DIAGNOSIS — M50.30 DDD (DEGENERATIVE DISC DISEASE), CERVICAL: ICD-10-CM

## 2025-07-25 DIAGNOSIS — E11.9 TYPE 2 DIABETES MELLITUS WITHOUT COMPLICATION, WITHOUT LONG-TERM CURRENT USE OF INSULIN: Primary | ICD-10-CM

## 2025-07-25 DIAGNOSIS — G62.9 PERIPHERAL POLYNEUROPATHY: ICD-10-CM

## 2025-07-25 DIAGNOSIS — Z63.4 GRIEF AT LOSS OF CHILD: ICD-10-CM

## 2025-07-25 DIAGNOSIS — E11.9 TYPE 2 DIABETES MELLITUS WITHOUT COMPLICATION, WITHOUT LONG-TERM CURRENT USE OF INSULIN: ICD-10-CM

## 2025-07-25 DIAGNOSIS — F43.21 GRIEF AT LOSS OF CHILD: ICD-10-CM

## 2025-07-25 LAB — HBA1C MFR BLD: 6.7 %

## 2025-07-25 RX ORDER — CYCLOBENZAPRINE HCL 10 MG
10 TABLET ORAL 3 TIMES DAILY PRN
COMMUNITY

## 2025-07-25 RX ORDER — SEMAGLUTIDE 2.68 MG/ML
2 INJECTION, SOLUTION SUBCUTANEOUS
Qty: 9 ML | Refills: 3 | Status: SHIPPED | OUTPATIENT
Start: 2025-07-25 | End: 2026-07-25

## 2025-07-25 RX ORDER — GABAPENTIN 100 MG/1
CAPSULE ORAL
Qty: 90 CAPSULE | Refills: 1 | Status: SHIPPED | OUTPATIENT
Start: 2025-07-25

## 2025-07-25 RX ORDER — METFORMIN HYDROCHLORIDE 750 MG/1
750 TABLET, EXTENDED RELEASE ORAL 2 TIMES DAILY
Qty: 180 TABLET | Refills: 1 | Status: SHIPPED | OUTPATIENT
Start: 2025-07-25 | End: 2026-01-21

## 2025-07-25 RX ORDER — LOSARTAN POTASSIUM 50 MG/1
50 TABLET ORAL DAILY
Qty: 90 TABLET | Refills: 1 | Status: SHIPPED | OUTPATIENT
Start: 2025-07-25 | End: 2026-07-25

## 2025-07-25 RX ORDER — TRAZODONE HYDROCHLORIDE 50 MG/1
50 TABLET ORAL NIGHTLY
Qty: 90 TABLET | Refills: 1 | Status: SHIPPED | OUTPATIENT
Start: 2025-07-25 | End: 2026-01-21

## 2025-07-25 RX ORDER — SIMVASTATIN 10 MG/1
10 TABLET, FILM COATED ORAL NIGHTLY
Qty: 90 TABLET | Refills: 1 | Status: SHIPPED | OUTPATIENT
Start: 2025-07-25 | End: 2026-07-25

## 2025-07-25 SDOH — SOCIAL DETERMINANTS OF HEALTH (SDOH): DISSAPEARANCE AND DEATH OF FAMILY MEMBER: Z63.4

## 2025-07-25 NOTE — PROGRESS NOTES
SUBJECTIVE:    Patient ID: Angelina Jennings is a 64 y.o. female.    Chief Complaint: Diabetes (Bottles brought//Pt is here for a check up and medication refills//Foot exam ordered today//Pt would like to discuss a prescription for a CGM//Pain in the neck that radiates down to the shoulder blades//BRADY  )    History of Present Illness    CHIEF COMPLAINT:  Angelina presents today for follow up of diabetes management.    DIABETES MANAGEMENT:  She reports improved diabetes management with A1C decreasing from 7.3 to 6.7. Her weight has remained stable. Recent medication adjustments include Ozempic increase to 2 mg and Glipizide increased to twice daily dosing.    MUSCULOSKELETAL PAIN:  She reports right-sided neck and shoulder pain radiating from shoulder blade up to neck, which worsens later in the day after computer work. Pain is described as tolerable but increasingly difficult to manage in the afternoons. She has modified her computer use by switching mouse to left hand and uses heat therapy in car and at home for relief. Previous physical therapy and chiropractic treatment were not effective. A massage two months ago provided temporary relief. She denies severe limitations in movement but notes increasing discomfort with prolonged computer use.    PREVENTIVE CARE:  Recent eye exam showed no diabetic retinopathy, with few freckles and early cataracts not requiring intervention. GYN visit in April was unremarkable. Mammogram is scheduled for November.    CURRENT MEDICATIONS:  She takes Losartan, Ozempic, Simvastatin, Gabapentin, Trazodone, Metformin twice daily, and Glipizide twice daily. All medications require refills.      ROS:  General: -fever, -chills, -fatigue, -weight gain, -weight loss  Eyes: -vision changes, -redness, -discharge  ENT: -ear pain, -nasal congestion, -sore throat  Cardiovascular: -chest pain, -palpitations, -lower extremity edema  Respiratory: -cough, -shortness of breath  Gastrointestinal: -abdominal  pain, -nausea, -vomiting, -diarrhea, -constipation, -blood in stool  Genitourinary: -dysuria, -hematuria, -frequency  Musculoskeletal: -joint pain, -muscle pain, +neck pain, +pain with movement  Skin: -rash, -lesion  Neurological: -headache, -dizziness, -numbness, -tingling  Psychiatric: -anxiety, -depression, -sleep difficulty         Patient Outreach on 07/24/2025   Component Date Value Ref Range Status    Left Eye DM Retinopathy 06/27/2025 Negative  Negative Final    Right Eye DM Retinopathy 06/27/2025 Negative  Negative Final   Office Visit on 04/11/2025   Component Date Value Ref Range Status    HPV DNA High Risk 04/11/2025 Not Detected  NOT DETECTED Final    Source: 04/11/2025    Final    Clinical Information: 04/11/2025    Final    Last Menstrual Period 04/11/2025    Final    Previous PAP 04/11/2025    Final    Previous Bx 04/11/2025    Final    Statement of Adequacy: 04/11/2025    Final    Interpretation/Result: 04/11/2025    Final    Comment: 04/11/2025    Final    Cytotechnologist: 04/11/2025    Final    PATHOLOGIST: 04/11/2025    Final    Comment 04/11/2025    Final   Telephone on 03/03/2025   Component Date Value Ref Range Status    Color, UA 03/07/2025 YELLOW  YELLOW Final    Appearance, UA 03/07/2025 CLEAR  CLEAR Final    Specific Gravity, UA 03/07/2025 1.012  1.001 - 1.035 Final    pH, UA 03/07/2025 8.5 (H)  5.0 - 8.0 Final    Glucose, UA 03/07/2025 NEGATIVE  NEGATIVE Final    Bilirubin, UA 03/07/2025 NEGATIVE  NEGATIVE Final    Ketones, UA 03/07/2025 NEGATIVE  NEGATIVE Final    Occult Blood UA 03/07/2025 NEGATIVE  NEGATIVE Final    Protein, UA 03/07/2025 NEGATIVE  NEGATIVE Final    Nitrite, UA 03/07/2025 NEGATIVE  NEGATIVE Final    Leukocytes, UA 03/07/2025 TRACE (A)  NEGATIVE Final    WBC Casts, UA 03/07/2025 NONE SEEN  < OR = 5 /HPF Final    RBC Casts, UA 03/07/2025 NONE SEEN  < OR = 2 /HPF Final    Squam Epithel, UA 03/07/2025 6-10 (A)  < OR = 5 /HPF Final    Bacteria, UA 03/07/2025 NONE SEEN   NONE SEEN /HPF Final    Hyaline Casts, UA 03/07/2025 NONE SEEN  NONE SEEN /LPF Final    Service Cmt: 03/07/2025 SEE COMMENT   Final    Reflexive Urine Culture 03/07/2025 SEE COMMENT   Final    Urine Culture, Routine 03/07/2025 SEE COMMENT   Final    TSH w/reflex to FT4 03/07/2025 2.72  0.40 - 4.50 mIU/L Final    Cholesterol 03/07/2025 187  <200 mg/dL Final    HDL 03/07/2025 64  > OR = 50 mg/dL Final    Triglycerides 03/07/2025 190 (H)  <150 mg/dL Final    LDL Cholesterol 03/07/2025 95  mg/dL (calc) Final    HDL/Cholesterol Ratio 03/07/2025 2.9  <5.0 (calc) Final    Non HDL Chol. (LDL+VLDL) 03/07/2025 123  <130 mg/dL (calc) Final    WBC 03/07/2025 9.2  3.8 - 10.8 Thousand/uL Final    RBC 03/07/2025 5.11 (H)  3.80 - 5.10 Million/uL Final    Hemoglobin 03/07/2025 15.0  11.7 - 15.5 g/dL Final    Hematocrit 03/07/2025 44.9  35.0 - 45.0 % Final    MCV 03/07/2025 87.9  80.0 - 100.0 fL Final    MCH 03/07/2025 29.4  27.0 - 33.0 pg Final    MCHC 03/07/2025 33.4  32.0 - 36.0 g/dL Final    RDW 03/07/2025 13.2  11.0 - 15.0 % Final    Platelets 03/07/2025 350  140 - 400 Thousand/uL Final    MPV 03/07/2025 9.7  7.5 - 12.5 fL Final    Neutrophils, Abs 03/07/2025 5,870  1,500 - 7,800 cells/uL Final    Lymph # 03/07/2025 2,337  850 - 3,900 cells/uL Final    Mono # 03/07/2025 681  200 - 950 cells/uL Final    Eos # 03/07/2025 267  15 - 500 cells/uL Final    Baso # 03/07/2025 46  0 - 200 cells/uL Final    Neutrophils Relative 03/07/2025 63.8  % Final    Lymph % 03/07/2025 25.4  % Final    Mono % 03/07/2025 7.4  % Final    Eosinophil % 03/07/2025 2.9  % Final    Basophil % 03/07/2025 0.5  % Final    Glucose 03/07/2025 139 (H)  65 - 99 mg/dL Final    BUN 03/07/2025 10  7 - 25 mg/dL Final    Creatinine 03/07/2025 0.50  0.50 - 1.05 mg/dL Final    eGFR 03/07/2025 105  > OR = 60 mL/min/1.73m2 Final    BUN/Creatinine Ratio 03/07/2025 SEE NOTE:  6 - 22 (calc) Final    Sodium 03/07/2025 136  135 - 146 mmol/L Final    Potassium 03/07/2025 4.0   3.5 - 5.3 mmol/L Final    Chloride 03/07/2025 93 (L)  98 - 110 mmol/L Final    CO2 03/07/2025 28  20 - 32 mmol/L Final    Calcium 03/07/2025 10.0  8.6 - 10.4 mg/dL Final    Total Protein 03/07/2025 7.3  6.1 - 8.1 g/dL Final    Albumin 03/07/2025 4.5  3.6 - 5.1 g/dL Final    Globulin, Total 03/07/2025 2.8  1.9 - 3.7 g/dL (calc) Final    Albumin/Globulin Ratio 03/07/2025 1.6  1.0 - 2.5 (calc) Final    Total Bilirubin 03/07/2025 0.4  0.2 - 1.2 mg/dL Final    Alkaline Phosphatase 03/07/2025 105  37 - 153 U/L Final    AST 03/07/2025 16  10 - 35 U/L Final    ALT 03/07/2025 19  6 - 29 U/L Final    Creatinine, Urine 03/07/2025 49  20 - 275 mg/dL Final    Microalb, Ur 03/07/2025 1.5  See Note: mg/dL Final    Microalb/Creat Ratio 03/07/2025 31 (H)  <30 mg/g creat Final    Hemoglobin A1C 03/07/2025 7.6 (H)  <5.7 % of total Hgb Final       Past Medical History:   Diagnosis Date    Diabetes mellitus, type 2     Hypertension      Past Surgical History:   Procedure Laterality Date    COLONOSCOPY  2022    Dr. Stacy-RT 5 years    TONSILLECTOMY       Family History   Problem Relation Name Age of Onset    Early death Mother LUPILLO Price     Heart disease Father *     Breast cancer Maternal Aunt * 75    Cancer Maternal Aunt *     Diabetes Maternal Grandfather *     Cancer Paternal Grandmother *     Heart disease Paternal Grandfather *     Cancer Maternal Aunt *     Early death Daughter Sanam Seaberry     Early death Daughter Sanam Seaberry        Marital Status:   Alcohol History:  reports that she does not currently use alcohol.  Tobacco History:  reports that she quit smoking about 11 years ago. Her smoking use included cigarettes. She started smoking about 46 years ago. She has a 52.5 pack-year smoking history. She has been exposed to tobacco smoke. She has never used smokeless tobacco.  Drug History:  reports no history of drug use.    Review of patient's allergies indicates:  No Known Allergies  Current  "Medications[1]    Objective:      Vitals:    07/25/25 0921   BP: 124/70   Pulse: 70   SpO2: 96%   Weight: 110.9 kg (244 lb 9.6 oz)   Height: 5' 2" (1.575 m)     Physical Exam    General: No acute distress. Well-developed. Well-nourished.  Eyes: EOMI. Sclerae anicteric.  HENT: Normocephalic. Atraumatic. Nares patent. Moist oral mucosa.  Ears: Bilateral TMs clear. Bilateral EACs clear.  Cardiovascular: Regular rate. Regular rhythm. No murmurs. No rubs. No gallops. Normal S1, S2.  Respiratory: Normal respiratory effort. Clear to auscultation bilaterally. No rales. No rhonchi. No wheezing.  Abdomen: Soft. Non-tender. Non-distended. Normoactive bowel sounds.  Musculoskeletal: No  obvious deformity.  Extremities: No lower extremity edema.  Neurological: Alert & oriented x3. No slurred speech. Normal gait.  Psychiatric: Normal mood. Normal affect. Good insight. Good judgment.  Skin: Warm. Dry. No rash.         Assessment:       Assessment & Plan    - Reviewed A1C results, noting improvement to 6.7 from previous 7.3.  - Assessed recent eye exam results, noting no diabetic retinopathy and presence of cataracts not currently requiring intervention.  - Evaluated cervical spine XR from 2023, noting straightening of cervical lordosis and degenerative changes, particularly at C5-C6 level.  - Right-sided neck and shoulder pain correlates with radiographic findings.  - Determined massage therapy as initial conservative management based on previous positive response.  - Plan for potential MRI if symptoms persist to evaluate spinal cord and nerve root involvement.  - Noted presence of shoulder arthritis but assessed primary symptoms as likely cervical in origin.    TYPE 2 DIABETES MELLITUS:  - Noted improvement in A1C from 7.3 to 6.7 after increasing Ozempic to 2 mg, indicating improved glycemic control.  - Continue medications at current doses: Ozempic, Metformin, and Glipizide (twice daily).  - Eye exam shows no diabetes-related " changes, only cataracts and freckles.    ESSENTIAL HYPERTENSION:  - Continue Losartan at current dose.  - Blood pressure is well-controlled.    HYPERLIPIDEMIA:  - Continue Simvastatin at current dose for management.    CERVICALGIA:  - X-ray shows degenerative changes in the cervical spine with loss of physiologic curve and narrowed discs.  - Angelina reports neck pain, especially after computer work, currently using heat for relief.  - Continue Gabapentin at current dose.  - Recommend application of heat to affected area, particularly after work, and therapeutic massage focused on deep tissue work in the cervical region.  - Advise evaluating computer workstation ergonomics to reduce strain.  - Request patient to message after massage therapy to report effectiveness.  - If massage is ineffective or relief short-lived, contact office to discuss proceeding with MRI.  - Discussed potential epidural injection as treatment option if conservative measures fail.    PAIN IN RIGHT SHOULDER:  - X-ray shows arthritis in the shoulder, particularly in the acromioclavicular joint.  - Angelina reports shoulder pain, especially after computer work, and uses heat for relief.  - Continue Gabapentin at current dose.  - Recommend massage therapy and evaluate computer workstation ergonomics to reduce strain.  - Will consider MRI if symptoms persist.    CATARACT:  - Monitor for changes.  - Discussed potential intervention if condition worsens.    FOLLOW-UP:  - Return in 6 months (after the new year).  - Contact the office if any urgent concerns arise before the next scheduled visit.    OTHER MEDICATIONS:  - Continue Trazodone at current dose.       Plan:       Type 2 diabetes mellitus without complication, without long-term current use of insulin  Comments:  a1c remains well controlled 6.7% and will continue as is..  Orders:  -     Hemoglobin A1C, POCT  -      DIABETES FOOT EXAM  -     metFORMIN (GLUCOPHAGE-XR) 750 MG ER 24hr tablet; Take 1  tablet (750 mg total) by mouth 2 (two) times daily.  Dispense: 180 tablet; Refill: 1  -     simvastatin (ZOCOR) 10 MG tablet; Take 1 tablet (10 mg total) by mouth every evening.  Dispense: 90 tablet; Refill: 1  -     semaglutide (OZEMPIC) 2 mg/dose (8 mg/3 mL) PnIj; Inject 2 mg into the skin every 7 days.  Dispense: 9 mL; Refill: 3    Type 2 diabetes mellitus without complication, without long-term current use of insulin  -     Hemoglobin A1C, POCT  -      DIABETES FOOT EXAM  -     metFORMIN (GLUCOPHAGE-XR) 750 MG ER 24hr tablet; Take 1 tablet (750 mg total) by mouth 2 (two) times daily.  Dispense: 180 tablet; Refill: 1  -     simvastatin (ZOCOR) 10 MG tablet; Take 1 tablet (10 mg total) by mouth every evening.  Dispense: 90 tablet; Refill: 1  -     semaglutide (OZEMPIC) 2 mg/dose (8 mg/3 mL) PnIj; Inject 2 mg into the skin every 7 days.  Dispense: 9 mL; Refill: 3    Grief at loss of child  -     traZODone (DESYREL) 50 MG tablet; Take 1 tablet (50 mg total) by mouth every evening.  Dispense: 90 tablet; Refill: 1    Peripheral polyneuropathy  Comments:  she is on gabapentin with decent effect. will consider increasing dose if needed.  Orders:  -     gabapentin (NEURONTIN) 100 MG capsule; TAKE 1 CAPSULE BY MOUTH IN THE EVENING  Dispense: 90 capsule; Refill: 1    Essential hypertension  Comments:  stable. continue as is  Orders:  -     losartan (COZAAR) 50 MG tablet; Take 1 tablet (50 mg total) by mouth once daily.  Dispense: 90 tablet; Refill: 1    DDD (degenerative disc disease), cervical      Follow up in about 6 months (around 1/25/2026) for Diabetic Check-Up.    This note was generated with the assistance of ambient listening technology. Verbal consent was obtained by the patient and accompanying visitor(s) for the recording of patient appointment to facilitate this note. I attest to having reviewed and edited the generated note for accuracy, though some syntax or spelling errors may persist. Please contact the  author of this note for any clarification.          7/25/2025 Mendoza Hardy PA-C      Answers submitted by the patient for this visit:  Review of Systems Questionnaire (Submitted on 7/18/2025)  activity change: No  unexpected weight change: No  neck pain: No  hearing loss: No  rhinorrhea: No  trouble swallowing: No  eye discharge: No  visual disturbance: No  chest tightness: No  wheezing: No  chest pain: No  palpitations: No  blood in stool: No  constipation: No  vomiting: No  diarrhea: No  polydipsia: No  polyuria: No  difficulty urinating: No  hematuria: No  menstrual problem: No  dysuria: No  joint swelling: No  arthralgias: No  headaches: No  weakness: No  confusion: No  dysphoric mood: No         [1]   Current Outpatient Medications:     ascorbic acid, vitamin C, (VITAMIN C) 500 MG tablet, Take 500 mg by mouth once daily., Disp: , Rfl:     aspirin 81 MG Chew, Take 81 mg by mouth once daily. , Disp: , Rfl:     atenoloL-chlorthalidone (TENORETIC) 50-25 mg Tab, Take 1 tablet by mouth once daily., Disp: 90 tablet, Rfl: 1    b complex vitamins tablet, Take 1 tablet by mouth once daily., Disp: , Rfl:     blood sugar diagnostic (ONETOUCH ULTRA BLUE TEST STRIP) Strp, 30 strips by In Vitro route once daily. as directed, Disp: 30 strip, Rfl: 11    blood sugar diagnostic Strp, To check BG 1 time daily, to use with insurance preferred meter, Disp: 200 each, Rfl: 1    blood sugar diagnostic Strp, To check BG 2 times daily, to use with insurance preferred meter, Disp: 100 strip, Rfl: 1    cyclobenzaprine (FLEXERIL) 10 MG tablet, Take 10 mg by mouth 3 (three) times daily as needed for Muscle spasms., Disp: , Rfl:     glipiZIDE (GLUCOTROL) 5 MG tablet, Take 1 tablet (5 mg total) by mouth 2 (two) times daily with meals., Disp: 180 tablet, Rfl: 1    lancets Misc, To check BG 2 times daily, to use with insurance preferred meter, Disp: 90 each, Rfl: 1    ONETOUCH DELICA PLUS LANCET 33 gauge Misc, Apply topically once daily.,  Disp: , Rfl:     ONETOUCH ULTRA2 METER [facility], USE TO CHECK GLUCOSE ONCE DAILY, Disp: , Rfl:     potassium chloride (MICRO-K) 10 MEQ CpSR, Take 3 capsules by mouth once daily for 180 doses, Disp: 270 capsule, Rfl: 1    triamcinolone acetonide 0.1% (KENALOG) 0.1 % cream, Apply topically 2 (two) times daily. To ear eczema, Disp: 30 g, Rfl: 1    zinc gluconate 50 mg tablet, Take 50 mg by mouth once daily., Disp: , Rfl:     gabapentin (NEURONTIN) 100 MG capsule, TAKE 1 CAPSULE BY MOUTH IN THE EVENING, Disp: 90 capsule, Rfl: 1    losartan (COZAAR) 50 MG tablet, Take 1 tablet (50 mg total) by mouth once daily., Disp: 90 tablet, Rfl: 1    metFORMIN (GLUCOPHAGE-XR) 750 MG ER 24hr tablet, Take 1 tablet (750 mg total) by mouth 2 (two) times daily., Disp: 180 tablet, Rfl: 1    semaglutide (OZEMPIC) 2 mg/dose (8 mg/3 mL) PnIj, Inject 2 mg into the skin every 7 days., Disp: 9 mL, Rfl: 3    simvastatin (ZOCOR) 10 MG tablet, Take 1 tablet (10 mg total) by mouth every evening., Disp: 90 tablet, Rfl: 1    traZODone (DESYREL) 50 MG tablet, Take 1 tablet (50 mg total) by mouth every evening., Disp: 90 tablet, Rfl: 1